# Patient Record
Sex: FEMALE | Race: ASIAN | URBAN - METROPOLITAN AREA
[De-identification: names, ages, dates, MRNs, and addresses within clinical notes are randomized per-mention and may not be internally consistent; named-entity substitution may affect disease eponyms.]

---

## 2017-01-30 ENCOUNTER — OFFICE VISIT - HEALTHEAST (OUTPATIENT)
Dept: INTERNAL MEDICINE | Facility: CLINIC | Age: 42
End: 2017-01-30

## 2017-01-30 ENCOUNTER — HOSPITAL ENCOUNTER (OUTPATIENT)
Dept: CT IMAGING | Facility: CLINIC | Age: 42
Discharge: HOME | End: 2017-01-30
Attending: INTERNAL MEDICINE

## 2017-01-30 DIAGNOSIS — R10.9 ABDOMINAL PAIN: ICD-10-CM

## 2017-01-30 DIAGNOSIS — R19.7 DIARRHEA: ICD-10-CM

## 2017-01-30 ASSESSMENT — MIFFLIN-ST. JEOR: SCORE: 1338.08

## 2017-02-14 ENCOUNTER — COMMUNICATION - HEALTHEAST (OUTPATIENT)
Dept: INTERNAL MEDICINE | Facility: CLINIC | Age: 42
End: 2017-02-14

## 2017-03-01 ENCOUNTER — RECORDS - HEALTHEAST (OUTPATIENT)
Dept: ADMINISTRATIVE | Facility: OTHER | Age: 42
End: 2017-03-01

## 2017-03-02 ENCOUNTER — RECORDS - HEALTHEAST (OUTPATIENT)
Dept: ADMINISTRATIVE | Facility: OTHER | Age: 42
End: 2017-03-02

## 2017-04-05 ENCOUNTER — COMMUNICATION - HEALTHEAST (OUTPATIENT)
Dept: INTERNAL MEDICINE | Facility: CLINIC | Age: 42
End: 2017-04-05

## 2017-04-05 DIAGNOSIS — I10 HYPERTENSION, ESSENTIAL: ICD-10-CM

## 2017-04-20 ENCOUNTER — RECORDS - HEALTHEAST (OUTPATIENT)
Dept: ADMINISTRATIVE | Facility: OTHER | Age: 42
End: 2017-04-20

## 2017-04-24 ENCOUNTER — OFFICE VISIT - HEALTHEAST (OUTPATIENT)
Dept: UROLOGY | Facility: CLINIC | Age: 42
End: 2017-04-24

## 2017-04-24 DIAGNOSIS — N20.1 CALCULUS OF URETER: ICD-10-CM

## 2017-04-24 DIAGNOSIS — N20.9 URINARY CALCULUS, UNSPECIFIED: ICD-10-CM

## 2017-04-24 DIAGNOSIS — N20.0 CALCULUS OF KIDNEY: ICD-10-CM

## 2017-04-24 DIAGNOSIS — R10.30 LOWER ABDOMINAL PAIN: ICD-10-CM

## 2017-04-24 DIAGNOSIS — N13.2 HYDRONEPHROSIS WITH URINARY OBSTRUCTION DUE TO URETERAL CALCULUS: ICD-10-CM

## 2017-05-08 ENCOUNTER — AMBULATORY - HEALTHEAST (OUTPATIENT)
Dept: UROLOGY | Facility: CLINIC | Age: 42
End: 2017-05-08

## 2017-05-08 DIAGNOSIS — N20.9 URINARY CALCULUS, UNSPECIFIED: ICD-10-CM

## 2017-05-18 ENCOUNTER — AMBULATORY - HEALTHEAST (OUTPATIENT)
Dept: LAB | Facility: CLINIC | Age: 42
End: 2017-05-18

## 2017-05-18 DIAGNOSIS — N20.9 URINARY CALCULUS, UNSPECIFIED: ICD-10-CM

## 2017-05-20 LAB
1ST CONSTITUENT:: NORMAL
2ND CONSTITUENT:: NORMAL
3RD CONSTITUENT:: NORMAL
SOURCE: NORMAL

## 2017-05-25 ENCOUNTER — OFFICE VISIT - HEALTHEAST (OUTPATIENT)
Dept: UROLOGY | Facility: CLINIC | Age: 42
End: 2017-05-25

## 2017-05-25 DIAGNOSIS — N20.1 CALCULUS OF URETER: ICD-10-CM

## 2017-05-25 DIAGNOSIS — N20.9 URINARY CALCULUS, UNSPECIFIED: ICD-10-CM

## 2017-05-25 DIAGNOSIS — N20.0 CALCULUS OF KIDNEY: ICD-10-CM

## 2017-06-30 ENCOUNTER — COMMUNICATION - HEALTHEAST (OUTPATIENT)
Dept: INTERNAL MEDICINE | Facility: CLINIC | Age: 42
End: 2017-06-30

## 2017-09-13 ENCOUNTER — COMMUNICATION - HEALTHEAST (OUTPATIENT)
Dept: HEALTH INFORMATION MANAGEMENT | Facility: CLINIC | Age: 42
End: 2017-09-13

## 2017-12-14 ENCOUNTER — RECORDS - HEALTHEAST (OUTPATIENT)
Dept: ADMINISTRATIVE | Facility: OTHER | Age: 42
End: 2017-12-14

## 2018-02-13 ENCOUNTER — OFFICE VISIT - HEALTHEAST (OUTPATIENT)
Dept: ALLERGY | Facility: CLINIC | Age: 43
End: 2018-02-13

## 2018-02-13 DIAGNOSIS — T78.40XD ALLERGIC REACTION, SUBSEQUENT ENCOUNTER: ICD-10-CM

## 2018-02-13 DIAGNOSIS — Z91.018 ALLERGY TO TREE NUTS: ICD-10-CM

## 2018-02-13 RX ORDER — EPINEPHRINE 0.3 MG/.3ML
INJECTION SUBCUTANEOUS
Qty: 2 | Refills: 0 | Status: SHIPPED | OUTPATIENT
Start: 2018-02-13

## 2018-02-13 ASSESSMENT — MIFFLIN-ST. JEOR: SCORE: 1336.95

## 2018-02-14 ENCOUNTER — COMMUNICATION - HEALTHEAST (OUTPATIENT)
Dept: ALLERGY | Facility: CLINIC | Age: 43
End: 2018-02-14

## 2018-02-19 ENCOUNTER — HOSPITAL ENCOUNTER (OUTPATIENT)
Dept: PHYSICAL MEDICINE AND REHAB | Facility: CLINIC | Age: 43
Discharge: HOME | End: 2018-02-19
Attending: NURSE PRACTITIONER

## 2018-02-19 DIAGNOSIS — M54.12 RADICULITIS OF LEFT CERVICAL REGION: ICD-10-CM

## 2018-02-19 DIAGNOSIS — R20.2 NUMBNESS AND TINGLING IN LEFT ARM: ICD-10-CM

## 2018-02-19 DIAGNOSIS — M79.18 MYOFASCIAL PAIN: ICD-10-CM

## 2018-02-19 DIAGNOSIS — R20.0 NUMBNESS AND TINGLING IN LEFT ARM: ICD-10-CM

## 2018-02-20 ENCOUNTER — OFFICE VISIT - HEALTHEAST (OUTPATIENT)
Dept: PHYSICAL THERAPY | Facility: REHABILITATION | Age: 43
End: 2018-02-20

## 2018-02-20 DIAGNOSIS — R20.0 NUMBNESS AND TINGLING IN LEFT UPPER EXTREMITY: ICD-10-CM

## 2018-02-20 DIAGNOSIS — R29.3 POOR POSTURE: ICD-10-CM

## 2018-02-20 DIAGNOSIS — M54.12 CERVICAL RADICULITIS: ICD-10-CM

## 2018-02-20 DIAGNOSIS — R20.2 NUMBNESS AND TINGLING IN LEFT UPPER EXTREMITY: ICD-10-CM

## 2018-02-22 ENCOUNTER — OFFICE VISIT - HEALTHEAST (OUTPATIENT)
Dept: PHYSICAL THERAPY | Facility: REHABILITATION | Age: 43
End: 2018-02-22

## 2018-02-22 DIAGNOSIS — R20.0 NUMBNESS AND TINGLING IN LEFT UPPER EXTREMITY: ICD-10-CM

## 2018-02-22 DIAGNOSIS — R20.2 NUMBNESS AND TINGLING IN LEFT UPPER EXTREMITY: ICD-10-CM

## 2018-02-22 DIAGNOSIS — R29.3 POOR POSTURE: ICD-10-CM

## 2018-02-22 DIAGNOSIS — M54.12 CERVICAL RADICULITIS: ICD-10-CM

## 2018-02-27 ENCOUNTER — HOSPITAL ENCOUNTER (OUTPATIENT)
Dept: MRI IMAGING | Facility: CLINIC | Age: 43
Discharge: HOME | End: 2018-02-27

## 2018-02-27 DIAGNOSIS — R20.2 NUMBNESS AND TINGLING IN LEFT ARM: ICD-10-CM

## 2018-02-27 DIAGNOSIS — M54.12 RADICULITIS OF LEFT CERVICAL REGION: ICD-10-CM

## 2018-02-27 DIAGNOSIS — R20.0 NUMBNESS AND TINGLING IN LEFT ARM: ICD-10-CM

## 2018-03-02 ENCOUNTER — COMMUNICATION - HEALTHEAST (OUTPATIENT)
Dept: PHYSICAL MEDICINE AND REHAB | Facility: CLINIC | Age: 43
End: 2018-03-02

## 2018-03-02 DIAGNOSIS — M54.12 RADICULITIS OF LEFT CERVICAL REGION: ICD-10-CM

## 2018-03-02 DIAGNOSIS — M79.18 MYOFASCIAL PAIN: ICD-10-CM

## 2018-03-08 ENCOUNTER — COMMUNICATION - HEALTHEAST (OUTPATIENT)
Dept: HEALTH INFORMATION MANAGEMENT | Facility: CLINIC | Age: 43
End: 2018-03-08

## 2018-03-08 ENCOUNTER — COMMUNICATION - HEALTHEAST (OUTPATIENT)
Dept: TELEHEALTH | Facility: CLINIC | Age: 43
End: 2018-03-08

## 2018-03-09 ENCOUNTER — HOSPITAL ENCOUNTER (OUTPATIENT)
Dept: PHYSICAL MEDICINE AND REHAB | Facility: CLINIC | Age: 43
Discharge: HOME | End: 2018-03-09
Attending: NURSE PRACTITIONER

## 2018-03-09 DIAGNOSIS — M48.02 CERVICAL STENOSIS OF SPINE: ICD-10-CM

## 2018-03-09 DIAGNOSIS — M48.02 FORAMINAL STENOSIS OF CERVICAL REGION: ICD-10-CM

## 2018-03-09 DIAGNOSIS — M79.18 MYOFASCIAL PAIN: ICD-10-CM

## 2018-03-09 DIAGNOSIS — R20.2 NUMBNESS AND TINGLING IN LEFT ARM: ICD-10-CM

## 2018-03-09 DIAGNOSIS — R20.0 NUMBNESS AND TINGLING IN LEFT ARM: ICD-10-CM

## 2018-03-09 DIAGNOSIS — M54.12 RADICULITIS OF LEFT CERVICAL REGION: ICD-10-CM

## 2018-03-14 ENCOUNTER — HOSPITAL ENCOUNTER (OUTPATIENT)
Dept: PHYSICAL MEDICINE AND REHAB | Facility: CLINIC | Age: 43
Discharge: HOME | End: 2018-03-14
Attending: PHYSICAL MEDICINE & REHABILITATION

## 2018-03-14 DIAGNOSIS — M99.81 OTHER BIOMECHANICAL LESIONS OF CERVICAL REGION: ICD-10-CM

## 2018-03-14 DIAGNOSIS — M48.02 FORAMINAL STENOSIS OF CERVICAL REGION: ICD-10-CM

## 2018-03-14 DIAGNOSIS — M54.12 RADICULITIS OF LEFT CERVICAL REGION: ICD-10-CM

## 2018-03-26 ENCOUNTER — COMMUNICATION - HEALTHEAST (OUTPATIENT)
Dept: INTERNAL MEDICINE | Facility: CLINIC | Age: 43
End: 2018-03-26

## 2018-03-26 DIAGNOSIS — I10 HYPERTENSION, ESSENTIAL: ICD-10-CM

## 2018-03-28 ENCOUNTER — RECORDS - HEALTHEAST (OUTPATIENT)
Dept: ADMINISTRATIVE | Facility: OTHER | Age: 43
End: 2018-03-28

## 2018-03-29 ENCOUNTER — HOSPITAL ENCOUNTER (OUTPATIENT)
Dept: PHYSICAL MEDICINE AND REHAB | Facility: CLINIC | Age: 43
Discharge: HOME | End: 2018-03-29
Attending: NURSE PRACTITIONER

## 2018-03-29 DIAGNOSIS — M48.02 FORAMINAL STENOSIS OF CERVICAL REGION: ICD-10-CM

## 2018-03-29 DIAGNOSIS — M54.12 RADICULITIS OF LEFT CERVICAL REGION: ICD-10-CM

## 2018-03-29 DIAGNOSIS — M79.18 MYOFASCIAL PAIN: ICD-10-CM

## 2018-03-29 DIAGNOSIS — R20.0 NUMBNESS AND TINGLING IN LEFT ARM: ICD-10-CM

## 2018-03-29 DIAGNOSIS — R20.2 NUMBNESS AND TINGLING IN LEFT ARM: ICD-10-CM

## 2018-03-29 DIAGNOSIS — M48.02 CERVICAL STENOSIS OF SPINE: ICD-10-CM

## 2018-03-31 ENCOUNTER — RECORDS - HEALTHEAST (OUTPATIENT)
Dept: RADIOLOGY | Facility: CLINIC | Age: 43
End: 2018-03-31

## 2018-04-04 ENCOUNTER — OFFICE VISIT - HEALTHEAST (OUTPATIENT)
Dept: NEUROSURGERY | Facility: CLINIC | Age: 43
End: 2018-04-04

## 2018-04-04 DIAGNOSIS — G54.2 CERVICAL NERVE ROOT COMPRESSION: ICD-10-CM

## 2018-04-04 DIAGNOSIS — G95.20 CORD COMPRESSION MYELOPATHY (H): ICD-10-CM

## 2018-04-04 ASSESSMENT — MIFFLIN-ST. JEOR: SCORE: 1309.73

## 2018-04-05 ENCOUNTER — RECORDS - HEALTHEAST (OUTPATIENT)
Dept: ADMINISTRATIVE | Facility: OTHER | Age: 43
End: 2018-04-05

## 2018-04-10 ENCOUNTER — COMMUNICATION - HEALTHEAST (OUTPATIENT)
Dept: INTERNAL MEDICINE | Facility: CLINIC | Age: 43
End: 2018-04-10

## 2018-04-11 ENCOUNTER — COMMUNICATION - HEALTHEAST (OUTPATIENT)
Dept: NEUROSURGERY | Facility: CLINIC | Age: 43
End: 2018-04-11

## 2018-04-13 ENCOUNTER — COMMUNICATION - HEALTHEAST (OUTPATIENT)
Dept: INTERNAL MEDICINE | Facility: CLINIC | Age: 43
End: 2018-04-13

## 2018-04-17 ENCOUNTER — COMMUNICATION - HEALTHEAST (OUTPATIENT)
Dept: INTERNAL MEDICINE | Facility: CLINIC | Age: 43
End: 2018-04-17

## 2018-04-18 ENCOUNTER — OFFICE VISIT - HEALTHEAST (OUTPATIENT)
Dept: INTERNAL MEDICINE | Facility: CLINIC | Age: 43
End: 2018-04-18

## 2018-04-18 DIAGNOSIS — M48.02 FORAMINAL STENOSIS OF CERVICAL REGION: ICD-10-CM

## 2018-04-18 DIAGNOSIS — G95.20 CERVICAL CORD COMPRESSION WITH MYELOPATHY (H): ICD-10-CM

## 2018-04-18 DIAGNOSIS — I10 ESSENTIAL HYPERTENSION: ICD-10-CM

## 2018-04-19 ENCOUNTER — RECORDS - HEALTHEAST (OUTPATIENT)
Dept: ADMINISTRATIVE | Facility: OTHER | Age: 43
End: 2018-04-19

## 2018-04-24 ENCOUNTER — COMMUNICATION - HEALTHEAST (OUTPATIENT)
Dept: NEUROSURGERY | Facility: CLINIC | Age: 43
End: 2018-04-24

## 2018-04-25 ENCOUNTER — OFFICE VISIT - HEALTHEAST (OUTPATIENT)
Dept: INTERNAL MEDICINE | Facility: CLINIC | Age: 43
End: 2018-04-25

## 2018-04-25 DIAGNOSIS — Z01.818 PREOP EXAM FOR INTERNAL MEDICINE: ICD-10-CM

## 2018-04-25 DIAGNOSIS — M48.02 FORAMINAL STENOSIS OF CERVICAL REGION: ICD-10-CM

## 2018-04-25 LAB
ALBUMIN UR-MCNC: NEGATIVE MG/DL
ANION GAP SERPL CALCULATED.3IONS-SCNC: 12 MMOL/L (ref 5–18)
APPEARANCE UR: CLEAR
ATRIAL RATE - MUSE: 72 BPM
BILIRUB UR QL STRIP: NEGATIVE
BUN SERPL-MCNC: 12 MG/DL (ref 8–22)
CALCIUM SERPL-MCNC: 9.2 MG/DL (ref 8.5–10.5)
CHLORIDE BLD-SCNC: 103 MMOL/L (ref 98–107)
CO2 SERPL-SCNC: 23 MMOL/L (ref 22–31)
COLOR UR AUTO: YELLOW
CREAT SERPL-MCNC: 0.63 MG/DL (ref 0.6–1.1)
DIASTOLIC BLOOD PRESSURE - MUSE: NORMAL MMHG
ERYTHROCYTE [DISTWIDTH] IN BLOOD BY AUTOMATED COUNT: 11.8 % (ref 11–14.5)
GFR SERPL CREATININE-BSD FRML MDRD: >60 ML/MIN/1.73M2
GLUCOSE BLD-MCNC: 97 MG/DL (ref 70–125)
GLUCOSE UR STRIP-MCNC: NEGATIVE MG/DL
HCG UR QL: NEGATIVE
HCT VFR BLD AUTO: 41.1 % (ref 35–47)
HGB BLD-MCNC: 13.8 G/DL (ref 12–16)
HGB UR QL STRIP: NEGATIVE
INTERPRETATION ECG - MUSE: NORMAL
KETONES UR STRIP-MCNC: ABNORMAL MG/DL
LEUKOCYTE ESTERASE UR QL STRIP: NEGATIVE
MCH RBC QN AUTO: 29.3 PG (ref 27–34)
MCHC RBC AUTO-ENTMCNC: 33.4 G/DL (ref 32–36)
MCV RBC AUTO: 88 FL (ref 80–100)
NITRATE UR QL: NEGATIVE
P AXIS - MUSE: 55 DEGREES
PH UR STRIP: 7.5 [PH] (ref 5–8)
PLATELET # BLD AUTO: 216 THOU/UL (ref 140–440)
PMV BLD AUTO: 7.3 FL (ref 7–10)
POTASSIUM BLD-SCNC: 3.8 MMOL/L (ref 3.5–5)
PR INTERVAL - MUSE: 154 MS
QRS DURATION - MUSE: 98 MS
QT - MUSE: 400 MS
QTC - MUSE: 438 MS
R AXIS - MUSE: 13 DEGREES
RBC # BLD AUTO: 4.69 MILL/UL (ref 3.8–5.4)
SODIUM SERPL-SCNC: 138 MMOL/L (ref 136–145)
SP GR UR STRIP: 1.01 (ref 1–1.03)
SP GR UR STRIP: 1.01 (ref 1–1.03)
SYSTOLIC BLOOD PRESSURE - MUSE: NORMAL MMHG
T AXIS - MUSE: 26 DEGREES
UROBILINOGEN UR STRIP-ACNC: ABNORMAL
VENTRICULAR RATE- MUSE: 72 BPM
WBC: 3 THOU/UL (ref 4–11)

## 2018-04-25 ASSESSMENT — MIFFLIN-ST. JEOR: SCORE: 1320.22

## 2018-04-30 ENCOUNTER — COMMUNICATION - HEALTHEAST (OUTPATIENT)
Dept: NEUROSURGERY | Facility: CLINIC | Age: 43
End: 2018-04-30

## 2018-05-02 ENCOUNTER — COMMUNICATION - HEALTHEAST (OUTPATIENT)
Dept: INTERNAL MEDICINE | Facility: CLINIC | Age: 43
End: 2018-05-02

## 2018-05-02 ENCOUNTER — OFFICE VISIT - HEALTHEAST (OUTPATIENT)
Dept: NEUROSURGERY | Facility: CLINIC | Age: 43
End: 2018-05-02

## 2018-05-02 DIAGNOSIS — Z01.818 PRE-OP EVALUATION: ICD-10-CM

## 2018-05-02 DIAGNOSIS — G95.20 CERVICAL SPINAL CORD COMPRESSION (H): ICD-10-CM

## 2018-05-07 ENCOUNTER — ANESTHESIA - HEALTHEAST (OUTPATIENT)
Dept: SURGERY | Facility: CLINIC | Age: 43
End: 2018-05-07

## 2018-05-07 ENCOUNTER — SURGERY - HEALTHEAST (OUTPATIENT)
Dept: SURGERY | Facility: CLINIC | Age: 43
End: 2018-05-07

## 2018-05-07 ASSESSMENT — MIFFLIN-ST. JEOR
SCORE: 1261.88
SCORE: 1304.06

## 2018-05-09 ENCOUNTER — COMMUNICATION - HEALTHEAST (OUTPATIENT)
Dept: NEUROSURGERY | Facility: CLINIC | Age: 43
End: 2018-05-09

## 2018-05-09 DIAGNOSIS — G95.20 CORD COMPRESSION MYELOPATHY (H): ICD-10-CM

## 2018-05-14 ENCOUNTER — COMMUNICATION - HEALTHEAST (OUTPATIENT)
Dept: NEUROSURGERY | Facility: CLINIC | Age: 43
End: 2018-05-14

## 2018-05-15 ENCOUNTER — TELEPHONE (OUTPATIENT)
Dept: OTHER | Facility: CLINIC | Age: 43
End: 2018-05-15

## 2018-05-15 ENCOUNTER — AMBULATORY - HEALTHEAST (OUTPATIENT)
Dept: NEUROSURGERY | Facility: CLINIC | Age: 43
End: 2018-05-15

## 2018-05-15 DIAGNOSIS — Z51.89 VISIT FOR WOUND CHECK: ICD-10-CM

## 2018-05-15 DIAGNOSIS — M79.18 MYOFASCIAL PAIN: ICD-10-CM

## 2018-05-15 DIAGNOSIS — G95.20 CERVICAL CORD COMPRESSION WITH MYELOPATHY (H): ICD-10-CM

## 2018-05-15 DIAGNOSIS — M54.12 RADICULITIS OF LEFT CERVICAL REGION: ICD-10-CM

## 2018-05-18 ENCOUNTER — RECORDS - HEALTHEAST (OUTPATIENT)
Dept: ADMINISTRATIVE | Facility: OTHER | Age: 43
End: 2018-05-18

## 2018-05-21 ENCOUNTER — AMBULATORY - HEALTHEAST (OUTPATIENT)
Dept: NEUROSURGERY | Facility: CLINIC | Age: 43
End: 2018-05-21

## 2018-05-22 ENCOUNTER — OFFICE VISIT - HEALTHEAST (OUTPATIENT)
Dept: INTERNAL MEDICINE | Facility: CLINIC | Age: 43
End: 2018-05-22

## 2018-05-22 DIAGNOSIS — M48.02 FORAMINAL STENOSIS OF CERVICAL REGION: ICD-10-CM

## 2018-05-22 DIAGNOSIS — I10 ESSENTIAL HYPERTENSION: ICD-10-CM

## 2018-05-29 ENCOUNTER — COMMUNICATION - HEALTHEAST (OUTPATIENT)
Dept: NEUROSURGERY | Facility: CLINIC | Age: 43
End: 2018-05-29

## 2018-05-30 ENCOUNTER — COMMUNICATION - HEALTHEAST (OUTPATIENT)
Dept: NEUROSURGERY | Facility: CLINIC | Age: 43
End: 2018-05-30

## 2018-05-30 DIAGNOSIS — M54.2 NECK PAIN: ICD-10-CM

## 2018-06-08 ENCOUNTER — HOSPITAL ENCOUNTER (OUTPATIENT)
Dept: RADIOLOGY | Facility: CLINIC | Age: 43
Discharge: HOME | End: 2018-06-08
Attending: NEUROLOGICAL SURGERY

## 2018-06-08 ENCOUNTER — OFFICE VISIT - HEALTHEAST (OUTPATIENT)
Dept: NEUROSURGERY | Facility: CLINIC | Age: 43
End: 2018-06-08

## 2018-06-08 DIAGNOSIS — G95.20 CORD COMPRESSION MYELOPATHY (H): ICD-10-CM

## 2018-06-08 DIAGNOSIS — G54.2 CERVICAL NERVE ROOT COMPRESSION: ICD-10-CM

## 2018-06-08 ASSESSMENT — MIFFLIN-ST. JEOR: SCORE: 1276.85

## 2018-06-19 ENCOUNTER — OFFICE VISIT - HEALTHEAST (OUTPATIENT)
Dept: PHYSICAL THERAPY | Facility: REHABILITATION | Age: 43
End: 2018-06-19

## 2018-06-19 DIAGNOSIS — G54.2 CERVICAL NERVE ROOT COMPRESSION: ICD-10-CM

## 2018-06-19 DIAGNOSIS — M54.2 CHRONIC NECK PAIN: ICD-10-CM

## 2018-06-19 DIAGNOSIS — I10 ESSENTIAL HYPERTENSION: ICD-10-CM

## 2018-06-19 DIAGNOSIS — M53.82 NECK MUSCLE WEAKNESS: ICD-10-CM

## 2018-06-19 DIAGNOSIS — R29.3 ABNORMAL POSTURE: ICD-10-CM

## 2018-06-19 DIAGNOSIS — G89.29 CHRONIC NECK PAIN: ICD-10-CM

## 2018-06-26 ENCOUNTER — COMMUNICATION - HEALTHEAST (OUTPATIENT)
Dept: NEUROSURGERY | Facility: CLINIC | Age: 43
End: 2018-06-26

## 2018-07-03 ENCOUNTER — OFFICE VISIT - HEALTHEAST (OUTPATIENT)
Dept: PHYSICAL THERAPY | Facility: REHABILITATION | Age: 43
End: 2018-07-03

## 2018-07-03 DIAGNOSIS — R20.2 NUMBNESS AND TINGLING IN LEFT UPPER EXTREMITY: ICD-10-CM

## 2018-07-03 DIAGNOSIS — M53.82 NECK MUSCLE WEAKNESS: ICD-10-CM

## 2018-07-03 DIAGNOSIS — G54.2 CERVICAL NERVE ROOT COMPRESSION: ICD-10-CM

## 2018-07-03 DIAGNOSIS — I10 ESSENTIAL HYPERTENSION: ICD-10-CM

## 2018-07-03 DIAGNOSIS — M54.12 CERVICAL RADICULITIS: ICD-10-CM

## 2018-07-03 DIAGNOSIS — R29.3 POOR POSTURE: ICD-10-CM

## 2018-07-03 DIAGNOSIS — R29.3 ABNORMAL POSTURE: ICD-10-CM

## 2018-07-03 DIAGNOSIS — R20.0 NUMBNESS AND TINGLING IN LEFT UPPER EXTREMITY: ICD-10-CM

## 2018-07-03 DIAGNOSIS — G89.29 CHRONIC NECK PAIN: ICD-10-CM

## 2018-07-03 DIAGNOSIS — M54.2 CHRONIC NECK PAIN: ICD-10-CM

## 2018-07-10 ENCOUNTER — OFFICE VISIT - HEALTHEAST (OUTPATIENT)
Dept: PHYSICAL THERAPY | Facility: REHABILITATION | Age: 43
End: 2018-07-10

## 2018-07-10 DIAGNOSIS — R29.3 ABNORMAL POSTURE: ICD-10-CM

## 2018-07-10 DIAGNOSIS — M53.82 NECK MUSCLE WEAKNESS: ICD-10-CM

## 2018-07-10 DIAGNOSIS — M54.12 CERVICAL RADICULITIS: ICD-10-CM

## 2018-07-10 DIAGNOSIS — R20.2 NUMBNESS AND TINGLING IN LEFT UPPER EXTREMITY: ICD-10-CM

## 2018-07-10 DIAGNOSIS — M54.2 CHRONIC NECK PAIN: ICD-10-CM

## 2018-07-10 DIAGNOSIS — R20.0 NUMBNESS AND TINGLING IN LEFT UPPER EXTREMITY: ICD-10-CM

## 2018-07-10 DIAGNOSIS — G54.2 CERVICAL NERVE ROOT COMPRESSION: ICD-10-CM

## 2018-07-10 DIAGNOSIS — G89.29 CHRONIC NECK PAIN: ICD-10-CM

## 2018-07-10 DIAGNOSIS — R29.3 POOR POSTURE: ICD-10-CM

## 2018-07-10 DIAGNOSIS — I10 ESSENTIAL HYPERTENSION: ICD-10-CM

## 2018-07-12 ENCOUNTER — COMMUNICATION - HEALTHEAST (OUTPATIENT)
Dept: NEUROSURGERY | Facility: CLINIC | Age: 43
End: 2018-07-12

## 2018-07-13 ENCOUNTER — AMBULATORY - HEALTHEAST (OUTPATIENT)
Dept: NEUROSURGERY | Facility: CLINIC | Age: 43
End: 2018-07-13

## 2018-07-13 DIAGNOSIS — M79.642 HAND PAIN, LEFT: ICD-10-CM

## 2018-07-17 ENCOUNTER — OFFICE VISIT - HEALTHEAST (OUTPATIENT)
Dept: PHYSICAL THERAPY | Facility: REHABILITATION | Age: 43
End: 2018-07-17

## 2018-07-17 DIAGNOSIS — M53.82 NECK MUSCLE WEAKNESS: ICD-10-CM

## 2018-07-17 DIAGNOSIS — R29.3 ABNORMAL POSTURE: ICD-10-CM

## 2018-07-17 DIAGNOSIS — M54.2 CHRONIC NECK PAIN: ICD-10-CM

## 2018-07-17 DIAGNOSIS — R29.3 POOR POSTURE: ICD-10-CM

## 2018-07-17 DIAGNOSIS — G89.29 CHRONIC NECK PAIN: ICD-10-CM

## 2018-07-17 DIAGNOSIS — I10 ESSENTIAL HYPERTENSION: ICD-10-CM

## 2018-07-17 DIAGNOSIS — G54.2 CERVICAL NERVE ROOT COMPRESSION: ICD-10-CM

## 2018-07-17 DIAGNOSIS — M54.12 CERVICAL RADICULITIS: ICD-10-CM

## 2018-07-17 DIAGNOSIS — R20.2 NUMBNESS AND TINGLING IN LEFT UPPER EXTREMITY: ICD-10-CM

## 2018-07-17 DIAGNOSIS — R20.0 NUMBNESS AND TINGLING IN LEFT UPPER EXTREMITY: ICD-10-CM

## 2018-07-26 ENCOUNTER — OFFICE VISIT - HEALTHEAST (OUTPATIENT)
Dept: PHYSICAL THERAPY | Facility: REHABILITATION | Age: 43
End: 2018-07-26

## 2018-07-26 DIAGNOSIS — G54.2 CERVICAL NERVE ROOT COMPRESSION: ICD-10-CM

## 2018-07-26 DIAGNOSIS — R29.3 ABNORMAL POSTURE: ICD-10-CM

## 2018-07-26 DIAGNOSIS — M53.82 NECK MUSCLE WEAKNESS: ICD-10-CM

## 2018-07-26 DIAGNOSIS — M54.2 CHRONIC NECK PAIN: ICD-10-CM

## 2018-07-26 DIAGNOSIS — I10 ESSENTIAL HYPERTENSION: ICD-10-CM

## 2018-07-26 DIAGNOSIS — G89.29 CHRONIC NECK PAIN: ICD-10-CM

## 2018-07-31 ENCOUNTER — OFFICE VISIT - HEALTHEAST (OUTPATIENT)
Dept: PHYSICAL THERAPY | Facility: REHABILITATION | Age: 43
End: 2018-07-31

## 2018-07-31 DIAGNOSIS — R29.3 ABNORMAL POSTURE: ICD-10-CM

## 2018-07-31 DIAGNOSIS — I10 ESSENTIAL HYPERTENSION: ICD-10-CM

## 2018-07-31 DIAGNOSIS — M53.82 NECK MUSCLE WEAKNESS: ICD-10-CM

## 2018-07-31 DIAGNOSIS — G89.29 CHRONIC NECK PAIN: ICD-10-CM

## 2018-07-31 DIAGNOSIS — G54.2 CERVICAL NERVE ROOT COMPRESSION: ICD-10-CM

## 2018-07-31 DIAGNOSIS — M54.2 CHRONIC NECK PAIN: ICD-10-CM

## 2018-08-03 ENCOUNTER — HOSPITAL ENCOUNTER (OUTPATIENT)
Dept: PHYSICAL MEDICINE AND REHAB | Facility: CLINIC | Age: 43
Discharge: HOME | End: 2018-08-03
Attending: NEUROLOGICAL SURGERY

## 2018-08-03 DIAGNOSIS — M79.642 HAND PAIN, LEFT: ICD-10-CM

## 2018-08-07 ENCOUNTER — OFFICE VISIT - HEALTHEAST (OUTPATIENT)
Dept: PHYSICAL THERAPY | Facility: REHABILITATION | Age: 43
End: 2018-08-07

## 2018-08-07 DIAGNOSIS — G89.29 CHRONIC NECK PAIN: ICD-10-CM

## 2018-08-07 DIAGNOSIS — I10 ESSENTIAL HYPERTENSION: ICD-10-CM

## 2018-08-07 DIAGNOSIS — G54.2 CERVICAL NERVE ROOT COMPRESSION: ICD-10-CM

## 2018-08-07 DIAGNOSIS — R29.3 ABNORMAL POSTURE: ICD-10-CM

## 2018-08-07 DIAGNOSIS — M54.2 CHRONIC NECK PAIN: ICD-10-CM

## 2018-08-07 DIAGNOSIS — M53.82 NECK MUSCLE WEAKNESS: ICD-10-CM

## 2018-08-10 ENCOUNTER — OFFICE VISIT - HEALTHEAST (OUTPATIENT)
Dept: NEUROSURGERY | Facility: CLINIC | Age: 43
End: 2018-08-10

## 2018-08-10 DIAGNOSIS — G54.2 CERVICAL NERVE ROOT COMPRESSION: ICD-10-CM

## 2018-08-10 ASSESSMENT — MIFFLIN-ST. JEOR: SCORE: 1245.09

## 2018-08-15 ENCOUNTER — HOSPITAL ENCOUNTER (OUTPATIENT)
Dept: PHYSICAL MEDICINE AND REHAB | Facility: CLINIC | Age: 43
Discharge: HOME | End: 2018-08-15
Attending: NEUROLOGICAL SURGERY

## 2018-08-15 ENCOUNTER — OFFICE VISIT - HEALTHEAST (OUTPATIENT)
Dept: PHYSICAL THERAPY | Facility: REHABILITATION | Age: 43
End: 2018-08-15

## 2018-08-15 DIAGNOSIS — I10 ESSENTIAL HYPERTENSION: ICD-10-CM

## 2018-08-15 DIAGNOSIS — M54.2 NECK PAIN: ICD-10-CM

## 2018-08-15 DIAGNOSIS — G89.29 CHRONIC NECK PAIN: ICD-10-CM

## 2018-08-15 DIAGNOSIS — G54.2 CERVICAL NERVE ROOT COMPRESSION: ICD-10-CM

## 2018-08-15 DIAGNOSIS — M54.2 CHRONIC NECK PAIN: ICD-10-CM

## 2018-08-15 DIAGNOSIS — R29.3 ABNORMAL POSTURE: ICD-10-CM

## 2018-08-15 DIAGNOSIS — M53.82 NECK MUSCLE WEAKNESS: ICD-10-CM

## 2018-08-20 ENCOUNTER — HOSPITAL ENCOUNTER (OUTPATIENT)
Dept: MRI IMAGING | Facility: CLINIC | Age: 43
Discharge: HOME | End: 2018-08-20
Attending: NEUROLOGICAL SURGERY

## 2018-08-20 DIAGNOSIS — M54.2 NECK PAIN: ICD-10-CM

## 2018-08-22 ENCOUNTER — COMMUNICATION - HEALTHEAST (OUTPATIENT)
Dept: NEUROSURGERY | Facility: CLINIC | Age: 43
End: 2018-08-22

## 2018-08-22 ENCOUNTER — OFFICE VISIT - HEALTHEAST (OUTPATIENT)
Dept: PHYSICAL THERAPY | Facility: REHABILITATION | Age: 43
End: 2018-08-22

## 2018-08-22 DIAGNOSIS — R29.3 ABNORMAL POSTURE: ICD-10-CM

## 2018-08-22 DIAGNOSIS — G54.2 CERVICAL NERVE ROOT COMPRESSION: ICD-10-CM

## 2018-08-22 DIAGNOSIS — M54.2 CHRONIC NECK PAIN: ICD-10-CM

## 2018-08-22 DIAGNOSIS — M53.82 NECK MUSCLE WEAKNESS: ICD-10-CM

## 2018-08-22 DIAGNOSIS — G89.29 CHRONIC NECK PAIN: ICD-10-CM

## 2018-08-23 ENCOUNTER — COMMUNICATION - HEALTHEAST (OUTPATIENT)
Dept: PHYSICAL MEDICINE AND REHAB | Facility: CLINIC | Age: 43
End: 2018-08-23

## 2018-08-27 ENCOUNTER — OFFICE VISIT - HEALTHEAST (OUTPATIENT)
Dept: PHYSICAL THERAPY | Facility: REHABILITATION | Age: 43
End: 2018-08-27

## 2018-08-27 DIAGNOSIS — G89.29 CHRONIC NECK PAIN: ICD-10-CM

## 2018-08-27 DIAGNOSIS — M54.2 CHRONIC NECK PAIN: ICD-10-CM

## 2018-08-27 DIAGNOSIS — G54.2 CERVICAL NERVE ROOT COMPRESSION: ICD-10-CM

## 2018-08-27 DIAGNOSIS — M53.82 NECK MUSCLE WEAKNESS: ICD-10-CM

## 2018-08-27 DIAGNOSIS — R29.3 ABNORMAL POSTURE: ICD-10-CM

## 2018-08-27 DIAGNOSIS — M54.12 CERVICAL RADICULITIS: ICD-10-CM

## 2018-08-27 DIAGNOSIS — I10 ESSENTIAL HYPERTENSION: ICD-10-CM

## 2018-09-07 ENCOUNTER — HOSPITAL ENCOUNTER (OUTPATIENT)
Dept: PHYSICAL MEDICINE AND REHAB | Facility: CLINIC | Age: 43
Discharge: HOME | End: 2018-09-07
Attending: NEUROLOGICAL SURGERY

## 2018-09-07 DIAGNOSIS — M54.12 RADICULITIS OF LEFT CERVICAL REGION: ICD-10-CM

## 2018-09-12 ENCOUNTER — OFFICE VISIT - HEALTHEAST (OUTPATIENT)
Dept: PHYSICAL THERAPY | Facility: REHABILITATION | Age: 43
End: 2018-09-12

## 2018-09-12 DIAGNOSIS — R29.3 ABNORMAL POSTURE: ICD-10-CM

## 2018-09-12 DIAGNOSIS — G54.2 CERVICAL NERVE ROOT COMPRESSION: ICD-10-CM

## 2018-09-12 DIAGNOSIS — M54.2 CHRONIC NECK PAIN: ICD-10-CM

## 2018-09-12 DIAGNOSIS — G89.29 CHRONIC NECK PAIN: ICD-10-CM

## 2018-09-12 DIAGNOSIS — M53.82 NECK MUSCLE WEAKNESS: ICD-10-CM

## 2018-09-12 DIAGNOSIS — I10 ESSENTIAL HYPERTENSION: ICD-10-CM

## 2018-09-17 ENCOUNTER — OFFICE VISIT - HEALTHEAST (OUTPATIENT)
Dept: PHYSICAL THERAPY | Facility: REHABILITATION | Age: 43
End: 2018-09-17

## 2018-09-17 DIAGNOSIS — G89.29 CHRONIC NECK PAIN: ICD-10-CM

## 2018-09-17 DIAGNOSIS — G54.2 CERVICAL NERVE ROOT COMPRESSION: ICD-10-CM

## 2018-09-17 DIAGNOSIS — I10 ESSENTIAL HYPERTENSION: ICD-10-CM

## 2018-09-17 DIAGNOSIS — M54.2 CHRONIC NECK PAIN: ICD-10-CM

## 2018-09-17 DIAGNOSIS — M53.82 NECK MUSCLE WEAKNESS: ICD-10-CM

## 2018-09-17 DIAGNOSIS — R29.3 ABNORMAL POSTURE: ICD-10-CM

## 2018-09-18 ENCOUNTER — COMMUNICATION - HEALTHEAST (OUTPATIENT)
Dept: INTERNAL MEDICINE | Facility: CLINIC | Age: 43
End: 2018-09-18

## 2018-09-18 ENCOUNTER — RECORDS - HEALTHEAST (OUTPATIENT)
Dept: ADMINISTRATIVE | Facility: OTHER | Age: 43
End: 2018-09-18

## 2018-09-19 ENCOUNTER — OFFICE VISIT - HEALTHEAST (OUTPATIENT)
Dept: INTERNAL MEDICINE | Facility: CLINIC | Age: 43
End: 2018-09-19

## 2018-09-19 DIAGNOSIS — K52.9 GASTROENTERITIS: ICD-10-CM

## 2018-09-26 ENCOUNTER — OFFICE VISIT - HEALTHEAST (OUTPATIENT)
Dept: PHYSICAL THERAPY | Facility: REHABILITATION | Age: 43
End: 2018-09-26

## 2018-09-26 DIAGNOSIS — G54.2 CERVICAL NERVE ROOT COMPRESSION: ICD-10-CM

## 2018-09-26 DIAGNOSIS — R29.3 ABNORMAL POSTURE: ICD-10-CM

## 2018-09-26 DIAGNOSIS — G89.29 CHRONIC NECK PAIN: ICD-10-CM

## 2018-09-26 DIAGNOSIS — M54.2 CHRONIC NECK PAIN: ICD-10-CM

## 2018-09-26 DIAGNOSIS — M53.82 NECK MUSCLE WEAKNESS: ICD-10-CM

## 2018-09-28 ENCOUNTER — OFFICE VISIT - HEALTHEAST (OUTPATIENT)
Dept: NEUROSURGERY | Facility: CLINIC | Age: 43
End: 2018-09-28

## 2018-09-28 DIAGNOSIS — G54.2 CERVICAL NERVE ROOT COMPRESSION: ICD-10-CM

## 2018-09-28 ASSESSMENT — MIFFLIN-ST. JEOR: SCORE: 1231.49

## 2018-10-03 ENCOUNTER — COMMUNICATION - HEALTHEAST (OUTPATIENT)
Dept: NEUROSURGERY | Facility: CLINIC | Age: 43
End: 2018-10-03

## 2018-10-10 ENCOUNTER — OFFICE VISIT - HEALTHEAST (OUTPATIENT)
Dept: ALLERGY | Facility: CLINIC | Age: 43
End: 2018-10-10

## 2018-10-10 DIAGNOSIS — J45.990 EXERCISE-INDUCED ASTHMA: ICD-10-CM

## 2018-10-10 DIAGNOSIS — Z91.018 FOOD ALLERGY: ICD-10-CM

## 2018-10-10 ASSESSMENT — MIFFLIN-ST. JEOR: SCORE: 1241.92

## 2018-10-11 LAB
CARROT IGE QN: <0.35 KU/L
EGG WHITE IGE QN: 0.5 KU/L

## 2018-10-12 LAB
ARUP MISCELLANEOUS TEST: NORMAL
BANANA IGE QN: <0.1 KU(A)/L
BLUE MUSSEL IGE QN: <0.1 KU/L
DEPRECATED MISC ALLERGEN IGE RAST QL: NORMAL
MISCELLANEOUS TEST DEPT. - HE HISTORICAL: NORMAL
OYSTER IGE QN: <0.1 KU(A)/L
PERFORMING LAB: NORMAL
SPECIMEN STATUS: NORMAL
TEST NAME: NORMAL
TRYPTASE SERPL-MCNC: 5.7 UG/L

## 2018-10-15 ENCOUNTER — COMMUNICATION - HEALTHEAST (OUTPATIENT)
Dept: ALLERGY | Facility: CLINIC | Age: 43
End: 2018-10-15

## 2018-10-29 ENCOUNTER — COMMUNICATION - HEALTHEAST (OUTPATIENT)
Dept: NEUROSURGERY | Facility: CLINIC | Age: 43
End: 2018-10-29

## 2018-10-31 ENCOUNTER — OFFICE VISIT - HEALTHEAST (OUTPATIENT)
Dept: INTERNAL MEDICINE | Facility: CLINIC | Age: 43
End: 2018-10-31

## 2018-10-31 DIAGNOSIS — G95.20 CERVICAL CORD COMPRESSION WITH MYELOPATHY (H): ICD-10-CM

## 2018-10-31 DIAGNOSIS — Z01.818 PREOP EXAMINATION: ICD-10-CM

## 2018-10-31 DIAGNOSIS — K21.9 GASTROESOPHAGEAL REFLUX DISEASE WITHOUT ESOPHAGITIS: ICD-10-CM

## 2018-10-31 DIAGNOSIS — I10 ESSENTIAL HYPERTENSION: ICD-10-CM

## 2018-10-31 LAB
ANION GAP SERPL CALCULATED.3IONS-SCNC: 13 MMOL/L (ref 5–18)
BUN SERPL-MCNC: 13 MG/DL (ref 8–22)
CALCIUM SERPL-MCNC: 9.6 MG/DL (ref 8.5–10.5)
CHLORIDE BLD-SCNC: 102 MMOL/L (ref 98–107)
CO2 SERPL-SCNC: 23 MMOL/L (ref 22–31)
CREAT SERPL-MCNC: 0.7 MG/DL (ref 0.6–1.1)
GFR SERPL CREATININE-BSD FRML MDRD: >60 ML/MIN/1.73M2
GLUCOSE BLD-MCNC: 81 MG/DL (ref 70–125)
HGB BLD-MCNC: 12.1 G/DL (ref 12–16)
POTASSIUM BLD-SCNC: 3.9 MMOL/L (ref 3.5–5)
SODIUM SERPL-SCNC: 138 MMOL/L (ref 136–145)

## 2018-10-31 RX ORDER — ALBUTEROL SULFATE 90 UG/1
2 AEROSOL, METERED RESPIRATORY (INHALATION) EVERY 6 HOURS PRN
Qty: 18 G | Refills: 5 | Status: SHIPPED | OUTPATIENT
Start: 2018-10-31

## 2018-10-31 ASSESSMENT — MIFFLIN-ST. JEOR: SCORE: 1238.29

## 2018-11-01 ENCOUNTER — COMMUNICATION - HEALTHEAST (OUTPATIENT)
Dept: INTERNAL MEDICINE | Facility: CLINIC | Age: 43
End: 2018-11-01

## 2018-11-02 ENCOUNTER — ANESTHESIA - HEALTHEAST (OUTPATIENT)
Dept: SURGERY | Facility: CLINIC | Age: 43
End: 2018-11-02

## 2018-11-02 ENCOUNTER — COMMUNICATION - HEALTHEAST (OUTPATIENT)
Dept: NEUROSURGERY | Facility: CLINIC | Age: 43
End: 2018-11-02

## 2018-11-05 ENCOUNTER — SURGERY - HEALTHEAST (OUTPATIENT)
Dept: SURGERY | Facility: CLINIC | Age: 43
End: 2018-11-05

## 2018-11-05 ASSESSMENT — MIFFLIN-ST. JEOR: SCORE: 1229.22

## 2018-11-06 ENCOUNTER — COMMUNICATION - HEALTHEAST (OUTPATIENT)
Dept: NEUROSURGERY | Facility: CLINIC | Age: 43
End: 2018-11-06

## 2018-11-06 DIAGNOSIS — G54.2 CERVICAL NERVE ROOT COMPRESSION: ICD-10-CM

## 2018-11-07 ENCOUNTER — COMMUNICATION - HEALTHEAST (OUTPATIENT)
Dept: NEUROSURGERY | Facility: CLINIC | Age: 43
End: 2018-11-07

## 2018-11-08 ENCOUNTER — COMMUNICATION - HEALTHEAST (OUTPATIENT)
Dept: NEUROSURGERY | Facility: CLINIC | Age: 43
End: 2018-11-08

## 2018-11-08 DIAGNOSIS — M54.2 NECK PAIN: ICD-10-CM

## 2018-11-14 ENCOUNTER — AMBULATORY - HEALTHEAST (OUTPATIENT)
Dept: NEUROSURGERY | Facility: CLINIC | Age: 43
End: 2018-11-14

## 2018-11-14 DIAGNOSIS — G54.2 CERVICAL NERVE ROOT COMPRESSION: ICD-10-CM

## 2018-11-19 ENCOUNTER — COMMUNICATION - HEALTHEAST (OUTPATIENT)
Dept: NEUROSURGERY | Facility: CLINIC | Age: 43
End: 2018-11-19

## 2018-11-23 ENCOUNTER — OFFICE VISIT - HEALTHEAST (OUTPATIENT)
Dept: PHYSICAL THERAPY | Facility: CLINIC | Age: 43
End: 2018-11-23

## 2018-11-23 DIAGNOSIS — M43.6 NECK STIFFNESS: ICD-10-CM

## 2018-11-23 DIAGNOSIS — M54.2 NECK PAIN: ICD-10-CM

## 2018-11-23 DIAGNOSIS — M53.82 NECK MUSCLE WEAKNESS: ICD-10-CM

## 2018-11-23 DIAGNOSIS — R29.898 DECREASED ROM OF NECK: ICD-10-CM

## 2018-11-26 ENCOUNTER — AMBULATORY - HEALTHEAST (OUTPATIENT)
Dept: NEUROSURGERY | Facility: CLINIC | Age: 43
End: 2018-11-26

## 2018-11-26 DIAGNOSIS — R49.0 VOICE HOARSENESS: ICD-10-CM

## 2018-11-30 ENCOUNTER — OFFICE VISIT - HEALTHEAST (OUTPATIENT)
Dept: PHYSICAL THERAPY | Facility: CLINIC | Age: 43
End: 2018-11-30

## 2018-11-30 DIAGNOSIS — M54.2 NECK PAIN: ICD-10-CM

## 2018-11-30 DIAGNOSIS — R29.898 DECREASED ROM OF NECK: ICD-10-CM

## 2018-11-30 DIAGNOSIS — M43.6 NECK STIFFNESS: ICD-10-CM

## 2018-11-30 DIAGNOSIS — M53.82 NECK MUSCLE WEAKNESS: ICD-10-CM

## 2018-12-03 ENCOUNTER — HOSPITAL ENCOUNTER (OUTPATIENT)
Dept: RADIOLOGY | Facility: HOSPITAL | Age: 43
Discharge: HOME | End: 2018-12-03
Attending: NEUROLOGICAL SURGERY

## 2018-12-03 ENCOUNTER — OFFICE VISIT - HEALTHEAST (OUTPATIENT)
Dept: NEUROSURGERY | Facility: CLINIC | Age: 43
End: 2018-12-03

## 2018-12-03 DIAGNOSIS — G95.20 CERVICAL CORD COMPRESSION WITH MYELOPATHY (H): ICD-10-CM

## 2018-12-03 DIAGNOSIS — G54.2 CERVICAL NERVE ROOT COMPRESSION: ICD-10-CM

## 2018-12-03 ASSESSMENT — MIFFLIN-ST. JEOR: SCORE: 1226.95

## 2018-12-12 ENCOUNTER — OFFICE VISIT - HEALTHEAST (OUTPATIENT)
Dept: PHYSICAL THERAPY | Facility: CLINIC | Age: 43
End: 2018-12-12

## 2018-12-12 DIAGNOSIS — M53.82 NECK MUSCLE WEAKNESS: ICD-10-CM

## 2018-12-12 DIAGNOSIS — R29.898 DECREASED ROM OF NECK: ICD-10-CM

## 2018-12-12 DIAGNOSIS — M43.6 NECK STIFFNESS: ICD-10-CM

## 2018-12-12 DIAGNOSIS — M54.2 NECK PAIN: ICD-10-CM

## 2018-12-13 ENCOUNTER — COMMUNICATION - HEALTHEAST (OUTPATIENT)
Dept: NEUROSURGERY | Facility: CLINIC | Age: 43
End: 2018-12-13

## 2018-12-19 ENCOUNTER — OFFICE VISIT - HEALTHEAST (OUTPATIENT)
Dept: PHYSICAL THERAPY | Facility: CLINIC | Age: 43
End: 2018-12-19

## 2018-12-19 DIAGNOSIS — R29.898 DECREASED ROM OF NECK: ICD-10-CM

## 2018-12-19 DIAGNOSIS — M53.82 NECK MUSCLE WEAKNESS: ICD-10-CM

## 2018-12-19 DIAGNOSIS — M54.2 NECK PAIN: ICD-10-CM

## 2018-12-19 DIAGNOSIS — M43.6 NECK STIFFNESS: ICD-10-CM

## 2019-01-09 ENCOUNTER — OFFICE VISIT - HEALTHEAST (OUTPATIENT)
Dept: PHYSICAL THERAPY | Facility: CLINIC | Age: 44
End: 2019-01-09

## 2019-01-09 DIAGNOSIS — M53.82 NECK MUSCLE WEAKNESS: ICD-10-CM

## 2019-01-09 DIAGNOSIS — M54.2 NECK PAIN: ICD-10-CM

## 2019-01-09 DIAGNOSIS — M43.6 NECK STIFFNESS: ICD-10-CM

## 2019-01-09 DIAGNOSIS — R29.898 DECREASED ROM OF NECK: ICD-10-CM

## 2019-01-16 ENCOUNTER — OFFICE VISIT - HEALTHEAST (OUTPATIENT)
Dept: PHYSICAL THERAPY | Facility: CLINIC | Age: 44
End: 2019-01-16

## 2019-01-16 DIAGNOSIS — M54.2 NECK PAIN: ICD-10-CM

## 2019-01-16 DIAGNOSIS — R29.898 DECREASED ROM OF NECK: ICD-10-CM

## 2019-01-16 DIAGNOSIS — M53.82 NECK MUSCLE WEAKNESS: ICD-10-CM

## 2019-01-16 DIAGNOSIS — M43.6 NECK STIFFNESS: ICD-10-CM

## 2019-01-30 ENCOUNTER — OFFICE VISIT - HEALTHEAST (OUTPATIENT)
Dept: PHYSICAL THERAPY | Facility: CLINIC | Age: 44
End: 2019-01-30

## 2019-01-30 DIAGNOSIS — R29.898 DECREASED ROM OF NECK: ICD-10-CM

## 2019-01-30 DIAGNOSIS — M43.6 NECK STIFFNESS: ICD-10-CM

## 2019-01-30 DIAGNOSIS — M53.82 NECK MUSCLE WEAKNESS: ICD-10-CM

## 2019-01-30 DIAGNOSIS — M54.2 NECK PAIN: ICD-10-CM

## 2019-03-05 ENCOUNTER — COMMUNICATION - HEALTHEAST (OUTPATIENT)
Dept: NEUROSURGERY | Facility: CLINIC | Age: 44
End: 2019-03-05

## 2019-03-11 ENCOUNTER — AMBULATORY - HEALTHEAST (OUTPATIENT)
Dept: NEUROSURGERY | Facility: CLINIC | Age: 44
End: 2019-03-11

## 2019-03-11 DIAGNOSIS — M79.622 PAIN OF LEFT UPPER ARM: ICD-10-CM

## 2019-03-26 ENCOUNTER — OFFICE VISIT - HEALTHEAST (OUTPATIENT)
Dept: CARDIOLOGY | Facility: CLINIC | Age: 44
End: 2019-03-26

## 2019-03-26 DIAGNOSIS — R00.1 BRADYCARDIA: ICD-10-CM

## 2019-03-26 LAB
CHOLEST SERPL-MCNC: 208 MG/DL
FASTING STATUS PATIENT QL REPORTED: NO
HDLC SERPL-MCNC: 71 MG/DL
LDLC SERPL CALC-MCNC: 76 MG/DL
TRIGL SERPL-MCNC: 304 MG/DL
TSH SERPL DL<=0.005 MIU/L-ACNC: 1.54 UIU/ML (ref 0.3–5)

## 2019-03-26 ASSESSMENT — MIFFLIN-ST. JEOR: SCORE: 1236.02

## 2019-04-01 ENCOUNTER — COMMUNICATION - HEALTHEAST (OUTPATIENT)
Dept: CARDIOLOGY | Facility: CLINIC | Age: 44
End: 2019-04-01

## 2019-04-02 ENCOUNTER — COMMUNICATION - HEALTHEAST (OUTPATIENT)
Dept: CARDIOLOGY | Facility: CLINIC | Age: 44
End: 2019-04-02

## 2019-04-03 ENCOUNTER — HOSPITAL ENCOUNTER (OUTPATIENT)
Dept: CARDIOLOGY | Facility: CLINIC | Age: 44
Discharge: HOME | End: 2019-04-03
Attending: INTERNAL MEDICINE

## 2019-04-03 ENCOUNTER — COMMUNICATION - HEALTHEAST (OUTPATIENT)
Dept: CARDIOLOGY | Facility: CLINIC | Age: 44
End: 2019-04-03

## 2019-04-03 DIAGNOSIS — R00.1 BRADYCARDIA: ICD-10-CM

## 2019-04-03 DIAGNOSIS — I10 HTN (HYPERTENSION): ICD-10-CM

## 2019-04-05 ENCOUNTER — OFFICE VISIT - HEALTHEAST (OUTPATIENT)
Dept: INTERNAL MEDICINE | Facility: CLINIC | Age: 44
End: 2019-04-05

## 2019-04-05 DIAGNOSIS — F33.9 EPISODE OF RECURRENT MAJOR DEPRESSIVE DISORDER, UNSPECIFIED DEPRESSION EPISODE SEVERITY (H): ICD-10-CM

## 2019-04-05 DIAGNOSIS — G95.20 CERVICAL CORD COMPRESSION WITH MYELOPATHY (H): ICD-10-CM

## 2019-04-05 DIAGNOSIS — R53.82 CHRONIC FATIGUE: ICD-10-CM

## 2019-04-05 DIAGNOSIS — G43.709 CHRONIC MIGRAINE WITHOUT AURA WITHOUT STATUS MIGRAINOSUS, NOT INTRACTABLE: ICD-10-CM

## 2019-04-05 DIAGNOSIS — I10 ESSENTIAL HYPERTENSION: ICD-10-CM

## 2019-04-05 DIAGNOSIS — Z00.00 HEALTH MAINTENANCE EXAMINATION: ICD-10-CM

## 2019-04-05 DIAGNOSIS — N84.1 CERVICAL POLYP: ICD-10-CM

## 2019-04-05 DIAGNOSIS — R40.0 DAYTIME SOMNOLENCE: ICD-10-CM

## 2019-04-05 DIAGNOSIS — R10.31 RIGHT INGUINAL PAIN: ICD-10-CM

## 2019-04-05 DIAGNOSIS — Z00.00 ROUTINE HISTORY AND PHYSICAL EXAMINATION OF ADULT: ICD-10-CM

## 2019-04-05 LAB
ALBUMIN SERPL-MCNC: 4 G/DL (ref 3.5–5)
ALP SERPL-CCNC: 58 U/L (ref 45–120)
ALT SERPL W P-5'-P-CCNC: 15 U/L (ref 0–45)
AST SERPL W P-5'-P-CCNC: 21 U/L (ref 0–40)
BASOPHILS # BLD AUTO: 0 THOU/UL (ref 0–0.2)
BASOPHILS NFR BLD AUTO: 1 % (ref 0–2)
BILIRUB DIRECT SERPL-MCNC: 0.3 MG/DL
BILIRUB SERPL-MCNC: 1 MG/DL (ref 0–1)
C REACTIVE PROTEIN LHE: <0.1 MG/DL (ref 0–0.8)
EOSINOPHIL # BLD AUTO: 0.1 THOU/UL (ref 0–0.4)
EOSINOPHIL NFR BLD AUTO: 2 % (ref 0–6)
ERYTHROCYTE [DISTWIDTH] IN BLOOD BY AUTOMATED COUNT: 15.6 % (ref 11–14.5)
HCT VFR BLD AUTO: 39.8 % (ref 35–47)
HGB BLD-MCNC: 12.2 G/DL (ref 12–16)
HIV 1+2 AB+HIV1 P24 AG SERPL QL IA: NEGATIVE
LYMPHOCYTES # BLD AUTO: 1.2 THOU/UL (ref 0.8–4.4)
LYMPHOCYTES NFR BLD AUTO: 41 % (ref 20–40)
MCH RBC QN AUTO: 26.4 PG (ref 27–34)
MCHC RBC AUTO-ENTMCNC: 30.7 G/DL (ref 32–36)
MCV RBC AUTO: 86 FL (ref 80–100)
MONOCYTES # BLD AUTO: 0.3 THOU/UL (ref 0–0.9)
MONOCYTES NFR BLD AUTO: 10 % (ref 2–10)
NEUTROPHILS # BLD AUTO: 1.3 THOU/UL (ref 2–7.7)
NEUTROPHILS NFR BLD AUTO: 46 % (ref 50–70)
PLATELET # BLD AUTO: 261 THOU/UL (ref 140–440)
PMV BLD AUTO: 10.8 FL (ref 8.5–12.5)
PROT SERPL-MCNC: 7.7 G/DL (ref 6–8)
RBC # BLD AUTO: 4.62 MILL/UL (ref 3.8–5.4)
WBC: 2.8 THOU/UL (ref 4–11)

## 2019-04-05 RX ORDER — CETIRIZINE HYDROCHLORIDE 10 MG/1
10 TABLET ORAL DAILY
Status: SHIPPED | COMMUNITY
Start: 2019-04-05

## 2019-04-05 ASSESSMENT — MIFFLIN-ST. JEOR: SCORE: 1209.94

## 2019-04-08 ENCOUNTER — HOSPITAL ENCOUNTER (OUTPATIENT)
Dept: ULTRASOUND IMAGING | Facility: CLINIC | Age: 44
Discharge: HOME | End: 2019-04-08
Attending: INTERNAL MEDICINE

## 2019-04-08 ENCOUNTER — HOSPITAL ENCOUNTER (OUTPATIENT)
Dept: MAMMOGRAPHY | Facility: CLINIC | Age: 44
Discharge: HOME | End: 2019-04-08
Attending: INTERNAL MEDICINE

## 2019-04-08 DIAGNOSIS — N84.1 CERVICAL POLYP: ICD-10-CM

## 2019-04-08 DIAGNOSIS — Z00.00 ROUTINE HISTORY AND PHYSICAL EXAMINATION OF ADULT: ICD-10-CM

## 2019-04-08 DIAGNOSIS — R10.31 RIGHT INGUINAL PAIN: ICD-10-CM

## 2019-04-08 LAB
25(OH)D3 SERPL-MCNC: 44.2 NG/ML (ref 30–80)
25(OH)D3 SERPL-MCNC: 44.2 NG/ML (ref 30–80)
HPV SOURCE: NORMAL
HUMAN PAPILLOMA VIRUS 16 DNA: NEGATIVE
HUMAN PAPILLOMA VIRUS 18 DNA: NEGATIVE
HUMAN PAPILLOMA VIRUS FINAL DIAGNOSIS: NORMAL
HUMAN PAPILLOMA VIRUS OTHER HR: NEGATIVE
SPECIMEN DESCRIPTION: NORMAL

## 2019-04-09 ENCOUNTER — COMMUNICATION - HEALTHEAST (OUTPATIENT)
Dept: CARDIOLOGY | Facility: CLINIC | Age: 44
End: 2019-04-09

## 2019-04-09 LAB — METHYLMALONATE SERPL-SCNC: 0.43 UMOL/L (ref 0–0.4)

## 2019-04-10 ENCOUNTER — AMBULATORY - HEALTHEAST (OUTPATIENT)
Dept: INTERNAL MEDICINE | Facility: CLINIC | Age: 44
End: 2019-04-10

## 2019-04-12 ENCOUNTER — COMMUNICATION - HEALTHEAST (OUTPATIENT)
Dept: INTERNAL MEDICINE | Facility: CLINIC | Age: 44
End: 2019-04-12

## 2019-04-13 ENCOUNTER — COMMUNICATION - HEALTHEAST (OUTPATIENT)
Dept: INTERNAL MEDICINE | Facility: CLINIC | Age: 44
End: 2019-04-13

## 2019-04-13 DIAGNOSIS — F33.1 MODERATE EPISODE OF RECURRENT MAJOR DEPRESSIVE DISORDER (H): ICD-10-CM

## 2019-04-17 ENCOUNTER — AMBULATORY - HEALTHEAST (OUTPATIENT)
Dept: NURSING | Facility: CLINIC | Age: 44
End: 2019-04-17

## 2019-06-28 ENCOUNTER — COMMUNICATION - HEALTHEAST (OUTPATIENT)
Dept: CARDIOLOGY | Facility: CLINIC | Age: 44
End: 2019-06-28

## 2019-06-28 DIAGNOSIS — I10 ESSENTIAL HYPERTENSION: ICD-10-CM

## 2019-07-02 ENCOUNTER — OFFICE VISIT - HEALTHEAST (OUTPATIENT)
Dept: CARDIOLOGY | Facility: CLINIC | Age: 44
End: 2019-07-02

## 2019-07-02 DIAGNOSIS — I10 ESSENTIAL HYPERTENSION: ICD-10-CM

## 2019-07-02 ASSESSMENT — MIFFLIN-ST. JEOR: SCORE: 1205.4

## 2019-07-29 ENCOUNTER — OFFICE VISIT - HEALTHEAST (OUTPATIENT)
Dept: INTERNAL MEDICINE | Facility: CLINIC | Age: 44
End: 2019-07-29

## 2019-07-29 DIAGNOSIS — G54.2 CERVICAL NERVE ROOT COMPRESSION: ICD-10-CM

## 2019-07-29 DIAGNOSIS — Z01.818 PREOP GENERAL PHYSICAL EXAM: ICD-10-CM

## 2019-07-29 LAB
ERYTHROCYTE [DISTWIDTH] IN BLOOD BY AUTOMATED COUNT: 15.2 % (ref 11–14.5)
HCT VFR BLD AUTO: 39.7 % (ref 35–47)
HGB BLD-MCNC: 13 G/DL (ref 12–16)
MCH RBC QN AUTO: 29.3 PG (ref 27–34)
MCHC RBC AUTO-ENTMCNC: 32.6 G/DL (ref 32–36)
MCV RBC AUTO: 90 FL (ref 80–100)
PLATELET # BLD AUTO: 244 THOU/UL (ref 140–440)
PMV BLD AUTO: 7.8 FL (ref 7–10)
RBC # BLD AUTO: 4.42 MILL/UL (ref 3.8–5.4)
WBC: 3.9 THOU/UL (ref 4–11)

## 2019-07-29 ASSESSMENT — MIFFLIN-ST. JEOR: SCORE: 1219.01

## 2019-09-12 ENCOUNTER — HOSPITAL ENCOUNTER (OUTPATIENT)
Dept: RADIOLOGY | Facility: CLINIC | Age: 44
Discharge: HOME | End: 2019-09-12

## 2019-09-12 ENCOUNTER — OFFICE VISIT - HEALTHEAST (OUTPATIENT)
Dept: INTERNAL MEDICINE | Facility: CLINIC | Age: 44
End: 2019-09-12

## 2019-09-12 DIAGNOSIS — M54.2 NECK PAIN: ICD-10-CM

## 2019-09-12 DIAGNOSIS — I10 ESSENTIAL HYPERTENSION: ICD-10-CM

## 2019-09-12 DIAGNOSIS — R55 SYNCOPE, UNSPECIFIED SYNCOPE TYPE: ICD-10-CM

## 2019-09-12 LAB
ANION GAP SERPL CALCULATED.3IONS-SCNC: 12 MMOL/L (ref 5–18)
ATRIAL RATE - MUSE: 80 BPM
BUN SERPL-MCNC: 13 MG/DL (ref 8–22)
CALCIUM SERPL-MCNC: 10 MG/DL (ref 8.5–10.5)
CHLORIDE BLD-SCNC: 101 MMOL/L (ref 98–107)
CO2 SERPL-SCNC: 27 MMOL/L (ref 22–31)
CREAT SERPL-MCNC: 0.71 MG/DL (ref 0.6–1.1)
DIASTOLIC BLOOD PRESSURE - MUSE: NORMAL
ERYTHROCYTE [DISTWIDTH] IN BLOOD BY AUTOMATED COUNT: 12.1 % (ref 11–14.5)
GFR SERPL CREATININE-BSD FRML MDRD: >60 ML/MIN/1.73M2
GLUCOSE BLD-MCNC: 81 MG/DL (ref 70–125)
HCT VFR BLD AUTO: 43.2 % (ref 35–47)
HGB BLD-MCNC: 14.1 G/DL (ref 12–16)
INTERPRETATION ECG - MUSE: NORMAL
MCH RBC QN AUTO: 30.2 PG (ref 27–34)
MCHC RBC AUTO-ENTMCNC: 32.7 G/DL (ref 32–36)
MCV RBC AUTO: 92 FL (ref 80–100)
P AXIS - MUSE: 64 DEGREES
PLATELET # BLD AUTO: 261 THOU/UL (ref 140–440)
PMV BLD AUTO: 7.8 FL (ref 7–10)
POTASSIUM BLD-SCNC: 3.4 MMOL/L (ref 3.5–5)
PR INTERVAL - MUSE: 156 MS
QRS DURATION - MUSE: 94 MS
QT - MUSE: 376 MS
QTC - MUSE: 433 MS
R AXIS - MUSE: 31 DEGREES
RBC # BLD AUTO: 4.68 MILL/UL (ref 3.8–5.4)
SODIUM SERPL-SCNC: 140 MMOL/L (ref 136–145)
SYSTOLIC BLOOD PRESSURE - MUSE: NORMAL
T AXIS - MUSE: 31 DEGREES
VENTRICULAR RATE- MUSE: 80 BPM
VIT B12 SERPL-MCNC: 1051 PG/ML (ref 213–816)
WBC: 3.1 THOU/UL (ref 4–11)

## 2019-09-12 ASSESSMENT — MIFFLIN-ST. JEOR: SCORE: 1219.01

## 2019-09-26 ENCOUNTER — COMMUNICATION - HEALTHEAST (OUTPATIENT)
Dept: INTERNAL MEDICINE | Facility: CLINIC | Age: 44
End: 2019-09-26

## 2019-09-26 ENCOUNTER — OFFICE VISIT - HEALTHEAST (OUTPATIENT)
Dept: INTERNAL MEDICINE | Facility: CLINIC | Age: 44
End: 2019-09-26

## 2019-09-26 DIAGNOSIS — R55 VASOVAGAL SYNCOPE: ICD-10-CM

## 2019-09-26 DIAGNOSIS — G89.18 POSTOPERATIVE PAIN: ICD-10-CM

## 2019-09-26 ASSESSMENT — MIFFLIN-ST. JEOR: SCORE: 1219.01

## 2019-09-27 ENCOUNTER — AMBULATORY - HEALTHEAST (OUTPATIENT)
Dept: INTERNAL MEDICINE | Facility: CLINIC | Age: 44
End: 2019-09-27

## 2019-09-27 DIAGNOSIS — R51.9 NEW ONSET HEADACHE: ICD-10-CM

## 2019-09-27 DIAGNOSIS — R55 VASOVAGAL SYNCOPE: ICD-10-CM

## 2019-09-30 ENCOUNTER — HOSPITAL ENCOUNTER (OUTPATIENT)
Dept: CARDIOLOGY | Facility: HOSPITAL | Age: 44
Discharge: HOME | End: 2019-09-30
Attending: INTERNAL MEDICINE

## 2019-09-30 DIAGNOSIS — R55 VASOVAGAL SYNCOPE: ICD-10-CM

## 2019-09-30 LAB
CV STRESS CURRENT BP HE: NORMAL
CV STRESS CURRENT HR HE: 101
CV STRESS CURRENT HR HE: 102
CV STRESS CURRENT HR HE: 103
CV STRESS CURRENT HR HE: 105
CV STRESS CURRENT HR HE: 105
CV STRESS CURRENT HR HE: 107
CV STRESS CURRENT HR HE: 107
CV STRESS CURRENT HR HE: 120
CV STRESS CURRENT HR HE: 123
CV STRESS CURRENT HR HE: 123
CV STRESS CURRENT HR HE: 124
CV STRESS CURRENT HR HE: 125
CV STRESS CURRENT HR HE: 135
CV STRESS CURRENT HR HE: 141
CV STRESS CURRENT HR HE: 142
CV STRESS CURRENT HR HE: 149
CV STRESS CURRENT HR HE: 151
CV STRESS CURRENT HR HE: 157
CV STRESS CURRENT HR HE: 167
CV STRESS CURRENT HR HE: 167
CV STRESS CURRENT HR HE: 78
CV STRESS CURRENT HR HE: 82
CV STRESS CURRENT HR HE: 88
CV STRESS CURRENT HR HE: 89
CV STRESS CURRENT HR HE: 89
CV STRESS CURRENT HR HE: 90
CV STRESS CURRENT HR HE: 93
CV STRESS CURRENT HR HE: 94
CV STRESS CURRENT HR HE: 95
CV STRESS DEVIATION TIME HE: NORMAL
CV STRESS ECHO PERCENT HR HE: NORMAL
CV STRESS EXERCISE STAGE HE: NORMAL
CV STRESS FINAL RESTING BP HE: NORMAL
CV STRESS FINAL RESTING HR HE: 90
CV STRESS MAX HR HE: 168
CV STRESS MAX TREADMILL GRADE HE: 18
CV STRESS MAX TREADMILL SPEED HE: 5
CV STRESS PEAK DIA BP HE: NORMAL
CV STRESS PEAK SYS BP HE: NORMAL
CV STRESS PHASE HE: NORMAL
CV STRESS PROTOCOL HE: NORMAL
CV STRESS RESTING PT POSITION HE: NORMAL
CV STRESS RESTING PT POSITION HE: NORMAL
CV STRESS ST DEVIATION AMOUNT HE: NORMAL
CV STRESS ST DEVIATION ELEVATION HE: NORMAL
CV STRESS ST EVELATION AMOUNT HE: NORMAL
CV STRESS TEST TYPE HE: NORMAL
CV STRESS TOTAL STAGE TIME MIN 1 HE: NORMAL
ECHO EJECTION FRACTION ESTIMATED: 60 %
STRESS ECHO BASELINE BP: NORMAL
STRESS ECHO BASELINE HR: 77
STRESS ECHO CALCULATED PERCENT HR: 94 %
STRESS ECHO LAST STRESS BP: NORMAL
STRESS ECHO LAST STRESS HR: 167
STRESS ECHO POST ESTIMATED WORKLOAD: 13.5
STRESS ECHO POST EXERCISE DUR MIN: 13
STRESS ECHO POST EXERCISE DUR SEC: 1
STRESS ECHO TARGET HR: 151

## 2019-10-11 ENCOUNTER — HOSPITAL ENCOUNTER (OUTPATIENT)
Dept: MRI IMAGING | Facility: CLINIC | Age: 44
Discharge: HOME | End: 2019-10-11
Attending: INTERNAL MEDICINE

## 2019-10-11 ENCOUNTER — COMMUNICATION - HEALTHEAST (OUTPATIENT)
Dept: HEALTH INFORMATION MANAGEMENT | Facility: CLINIC | Age: 44
End: 2019-10-11

## 2019-10-11 DIAGNOSIS — R55 VASOVAGAL SYNCOPE: ICD-10-CM

## 2019-10-11 DIAGNOSIS — R51.9 NEW ONSET HEADACHE: ICD-10-CM

## 2019-10-14 ENCOUNTER — AMBULATORY - HEALTHEAST (OUTPATIENT)
Dept: INTERNAL MEDICINE | Facility: CLINIC | Age: 44
End: 2019-10-14

## 2019-10-14 ENCOUNTER — COMMUNICATION - HEALTHEAST (OUTPATIENT)
Dept: INTERNAL MEDICINE | Facility: CLINIC | Age: 44
End: 2019-10-14

## 2019-10-14 DIAGNOSIS — I10 ESSENTIAL HYPERTENSION: ICD-10-CM

## 2019-12-05 ENCOUNTER — RECORDS - HEALTHEAST (OUTPATIENT)
Dept: ADMINISTRATIVE | Facility: OTHER | Age: 44
End: 2019-12-05

## 2020-01-27 ENCOUNTER — AMBULATORY - HEALTHEAST (OUTPATIENT)
Dept: LAB | Facility: CLINIC | Age: 45
End: 2020-01-27

## 2020-01-27 ENCOUNTER — COMMUNICATION - HEALTHEAST (OUTPATIENT)
Dept: SCHEDULING | Facility: CLINIC | Age: 45
End: 2020-01-27

## 2020-01-27 DIAGNOSIS — Z79.899 ENCOUNTER FOR LONG-TERM (CURRENT) USE OF HIGH-RISK MEDICATION: ICD-10-CM

## 2020-01-27 LAB
ALBUMIN SERPL-MCNC: 4.2 G/DL (ref 3.5–5)
ALP SERPL-CCNC: 55 U/L (ref 45–120)
ALT SERPL W P-5'-P-CCNC: 16 U/L (ref 0–45)
ANION GAP SERPL CALCULATED.3IONS-SCNC: 13 MMOL/L (ref 5–18)
AST SERPL W P-5'-P-CCNC: 25 U/L (ref 0–40)
BASOPHILS # BLD AUTO: 0 THOU/UL (ref 0–0.2)
BASOPHILS NFR BLD AUTO: 1 % (ref 0–2)
BILIRUB SERPL-MCNC: 0.6 MG/DL (ref 0–1)
BUN SERPL-MCNC: 11 MG/DL (ref 8–22)
CALCIUM SERPL-MCNC: 9.5 MG/DL (ref 8.5–10.5)
CHLORIDE BLD-SCNC: 100 MMOL/L (ref 98–107)
CO2 SERPL-SCNC: 25 MMOL/L (ref 22–31)
CREAT SERPL-MCNC: 0.71 MG/DL (ref 0.6–1.1)
EOSINOPHIL # BLD AUTO: 0.1 THOU/UL (ref 0–0.4)
EOSINOPHIL NFR BLD AUTO: 3 % (ref 0–6)
ERYTHROCYTE [DISTWIDTH] IN BLOOD BY AUTOMATED COUNT: 11.9 % (ref 11–14.5)
FOLATE SERPL-MCNC: >20 NG/ML
GFR SERPL CREATININE-BSD FRML MDRD: >60 ML/MIN/1.73M2
GLUCOSE BLD-MCNC: 84 MG/DL (ref 70–125)
HCT VFR BLD AUTO: 42.2 % (ref 35–47)
HGB BLD-MCNC: 13.6 G/DL (ref 12–16)
IRON SERPL-MCNC: 90 UG/DL (ref 42–175)
LYMPHOCYTES # BLD AUTO: 1.4 THOU/UL (ref 0.8–4.4)
LYMPHOCYTES NFR BLD AUTO: 38 % (ref 20–40)
MCH RBC QN AUTO: 30.2 PG (ref 27–34)
MCHC RBC AUTO-ENTMCNC: 32.1 G/DL (ref 32–36)
MCV RBC AUTO: 94 FL (ref 80–100)
MONOCYTES # BLD AUTO: 0.3 THOU/UL (ref 0–0.9)
MONOCYTES NFR BLD AUTO: 8 % (ref 2–10)
NEUTROPHILS # BLD AUTO: 1.9 THOU/UL (ref 2–7.7)
NEUTROPHILS NFR BLD AUTO: 51 % (ref 50–70)
PLATELET # BLD AUTO: 219 THOU/UL (ref 140–440)
PMV BLD AUTO: 7.8 FL (ref 7–10)
POTASSIUM BLD-SCNC: 3.6 MMOL/L (ref 3.5–5)
PROT SERPL-MCNC: 7.7 G/DL (ref 6–8)
RBC # BLD AUTO: 4.49 MILL/UL (ref 3.8–5.4)
SODIUM SERPL-SCNC: 138 MMOL/L (ref 136–145)
T4 FREE SERPL-MCNC: 1 NG/DL (ref 0.7–1.8)
TSH SERPL DL<=0.005 MIU/L-ACNC: 2.9 UIU/ML (ref 0.3–5)
VIT B12 SERPL-MCNC: 1114 PG/ML (ref 213–816)
WBC: 3.8 THOU/UL (ref 4–11)

## 2020-01-28 LAB
25(OH)D3 SERPL-MCNC: 62.4 NG/ML (ref 30–80)
25(OH)D3 SERPL-MCNC: 62.4 NG/ML (ref 30–80)

## 2020-03-04 ENCOUNTER — RECORDS - HEALTHEAST (OUTPATIENT)
Dept: ADMINISTRATIVE | Facility: OTHER | Age: 45
End: 2020-03-04

## 2020-03-24 ENCOUNTER — COMMUNICATION - HEALTHEAST (OUTPATIENT)
Dept: CARDIOLOGY | Facility: CLINIC | Age: 45
End: 2020-03-24

## 2020-03-24 DIAGNOSIS — I10 HTN (HYPERTENSION): ICD-10-CM

## 2020-03-24 RX ORDER — LOSARTAN POTASSIUM 100 MG/1
TABLET ORAL
Qty: 90 TABLET | Refills: 1 | Status: SHIPPED | OUTPATIENT
Start: 2020-03-24

## 2020-04-05 ENCOUNTER — COMMUNICATION - HEALTHEAST (OUTPATIENT)
Dept: INTERNAL MEDICINE | Facility: CLINIC | Age: 45
End: 2020-04-05

## 2020-04-05 DIAGNOSIS — F33.1 MODERATE EPISODE OF RECURRENT MAJOR DEPRESSIVE DISORDER (H): ICD-10-CM

## 2020-05-27 ENCOUNTER — COMMUNICATION - HEALTHEAST (OUTPATIENT)
Dept: ALLERGY | Facility: CLINIC | Age: 45
End: 2020-05-27

## 2020-05-27 ENCOUNTER — COMMUNICATION - HEALTHEAST (OUTPATIENT)
Dept: INTERNAL MEDICINE | Facility: CLINIC | Age: 45
End: 2020-05-27

## 2020-05-27 ENCOUNTER — COMMUNICATION - HEALTHEAST (OUTPATIENT)
Dept: CARDIOLOGY | Facility: CLINIC | Age: 45
End: 2020-05-27

## 2020-05-29 ENCOUNTER — OFFICE VISIT - HEALTHEAST (OUTPATIENT)
Dept: ALLERGY | Facility: CLINIC | Age: 45
End: 2020-05-29

## 2020-05-29 DIAGNOSIS — J38.3 VOCAL CORD DYSFUNCTION: ICD-10-CM

## 2020-05-29 DIAGNOSIS — T78.1XXA POLLEN-FOOD ALLERGY, INITIAL ENCOUNTER: ICD-10-CM

## 2020-05-29 DIAGNOSIS — J45.990 EXERCISE INDUCED BRONCHOSPASM: ICD-10-CM

## 2020-05-29 DIAGNOSIS — J30.1 SEASONAL ALLERGIC RHINITIS DUE TO POLLEN: ICD-10-CM

## 2020-05-29 RX ORDER — FLUOXETINE 40 MG/1
80 CAPSULE ORAL DAILY
Status: SHIPPED | COMMUNITY
Start: 2020-05-01

## 2020-05-29 ASSESSMENT — MIFFLIN-ST. JEOR: SCORE: 1147.57

## 2020-06-02 ENCOUNTER — AMBULATORY - HEALTHEAST (OUTPATIENT)
Dept: CARDIOLOGY | Facility: CLINIC | Age: 45
End: 2020-06-02

## 2020-06-04 ENCOUNTER — RECORDS - HEALTHEAST (OUTPATIENT)
Dept: ADMINISTRATIVE | Facility: OTHER | Age: 45
End: 2020-06-04

## 2020-06-04 ENCOUNTER — AMBULATORY - HEALTHEAST (OUTPATIENT)
Dept: CARDIOLOGY | Facility: CLINIC | Age: 45
End: 2020-06-04

## 2020-06-15 ENCOUNTER — COMMUNICATION - HEALTHEAST (OUTPATIENT)
Dept: CARDIOLOGY | Facility: CLINIC | Age: 45
End: 2020-06-15

## 2020-07-01 ENCOUNTER — AMBULATORY - HEALTHEAST (OUTPATIENT)
Dept: INTERNAL MEDICINE | Facility: CLINIC | Age: 45
End: 2020-07-01

## 2020-07-01 ENCOUNTER — COMMUNICATION - HEALTHEAST (OUTPATIENT)
Dept: INTERNAL MEDICINE | Facility: CLINIC | Age: 45
End: 2020-07-01

## 2020-07-01 DIAGNOSIS — F50.9 EATING DISORDER, UNSPECIFIED TYPE: ICD-10-CM

## 2020-07-03 ENCOUNTER — AMBULATORY - HEALTHEAST (OUTPATIENT)
Dept: LAB | Facility: CLINIC | Age: 45
End: 2020-07-03

## 2020-07-03 ENCOUNTER — AMBULATORY - HEALTHEAST (OUTPATIENT)
Dept: NURSING | Facility: CLINIC | Age: 45
End: 2020-07-03

## 2020-07-03 DIAGNOSIS — F50.9 EATING DISORDER, UNSPECIFIED TYPE: ICD-10-CM

## 2020-07-03 LAB
ALBUMIN SERPL-MCNC: 4 G/DL (ref 3.5–5)
ALP SERPL-CCNC: 59 U/L (ref 45–120)
ALT SERPL W P-5'-P-CCNC: 38 U/L (ref 0–45)
ANION GAP SERPL CALCULATED.3IONS-SCNC: 12 MMOL/L (ref 5–18)
AST SERPL W P-5'-P-CCNC: 38 U/L (ref 0–40)
BASOPHILS # BLD AUTO: 0 THOU/UL (ref 0–0.2)
BASOPHILS NFR BLD AUTO: 0 % (ref 0–2)
BILIRUB SERPL-MCNC: 0.6 MG/DL (ref 0–1)
BUN SERPL-MCNC: 9 MG/DL (ref 8–22)
CALCIUM SERPL-MCNC: 9.7 MG/DL (ref 8.5–10.5)
CHLORIDE BLD-SCNC: 99 MMOL/L (ref 98–107)
CO2 SERPL-SCNC: 27 MMOL/L (ref 22–31)
CREAT SERPL-MCNC: 0.8 MG/DL (ref 0.6–1.1)
EOSINOPHIL # BLD AUTO: 0.1 THOU/UL (ref 0–0.4)
EOSINOPHIL NFR BLD AUTO: 3 % (ref 0–6)
ERYTHROCYTE [DISTWIDTH] IN BLOOD BY AUTOMATED COUNT: 12.1 % (ref 11–14.5)
GFR SERPL CREATININE-BSD FRML MDRD: >60 ML/MIN/1.73M2
GLUCOSE BLD-MCNC: 82 MG/DL (ref 70–125)
HCT VFR BLD AUTO: 38 % (ref 35–47)
HGB BLD-MCNC: 12.5 G/DL (ref 12–16)
LYMPHOCYTES # BLD AUTO: 1 THOU/UL (ref 0.8–4.4)
LYMPHOCYTES NFR BLD AUTO: 22 % (ref 20–40)
MAGNESIUM SERPL-MCNC: 2 MG/DL (ref 1.8–2.6)
MCH RBC QN AUTO: 29.9 PG (ref 27–34)
MCHC RBC AUTO-ENTMCNC: 32.8 G/DL (ref 32–36)
MCV RBC AUTO: 91 FL (ref 80–100)
MONOCYTES # BLD AUTO: 0.3 THOU/UL (ref 0–0.9)
MONOCYTES NFR BLD AUTO: 7 % (ref 2–10)
NEUTROPHILS # BLD AUTO: 3.1 THOU/UL (ref 2–7.7)
NEUTROPHILS NFR BLD AUTO: 68 % (ref 50–70)
PLATELET # BLD AUTO: 312 THOU/UL (ref 140–440)
PMV BLD AUTO: 6.9 FL (ref 7–10)
POTASSIUM BLD-SCNC: 3.8 MMOL/L (ref 3.5–5)
PROT SERPL-MCNC: 7.6 G/DL (ref 6–8)
RBC # BLD AUTO: 4.17 MILL/UL (ref 3.8–5.4)
SODIUM SERPL-SCNC: 138 MMOL/L (ref 136–145)
TSH SERPL DL<=0.005 MIU/L-ACNC: 1.28 UIU/ML (ref 0.3–5)
WBC: 4.5 THOU/UL (ref 4–11)

## 2020-07-08 ENCOUNTER — COMMUNICATION - HEALTHEAST (OUTPATIENT)
Dept: INTERNAL MEDICINE | Facility: CLINIC | Age: 45
End: 2020-07-08

## 2020-07-08 DIAGNOSIS — R45.851 SUICIDAL IDEATION: ICD-10-CM

## 2020-07-14 ENCOUNTER — COMMUNICATION - HEALTHEAST (OUTPATIENT)
Dept: LAB | Facility: CLINIC | Age: 45
End: 2020-07-14

## 2020-07-14 ENCOUNTER — AMBULATORY - HEALTHEAST (OUTPATIENT)
Dept: INTERNAL MEDICINE | Facility: CLINIC | Age: 45
End: 2020-07-14

## 2020-07-14 DIAGNOSIS — R63.0 ANOREXIA: ICD-10-CM

## 2020-07-16 ENCOUNTER — RECORDS - HEALTHEAST (OUTPATIENT)
Dept: ADMINISTRATIVE | Facility: OTHER | Age: 45
End: 2020-07-16

## 2020-07-17 ENCOUNTER — RECORDS - HEALTHEAST (OUTPATIENT)
Dept: ADMINISTRATIVE | Facility: OTHER | Age: 45
End: 2020-07-17

## 2020-07-21 ENCOUNTER — RECORDS - HEALTHEAST (OUTPATIENT)
Dept: ADMINISTRATIVE | Facility: OTHER | Age: 45
End: 2020-07-21

## 2020-07-23 ENCOUNTER — COMMUNICATION - HEALTHEAST (OUTPATIENT)
Dept: ALLERGY | Facility: CLINIC | Age: 45
End: 2020-07-23

## 2020-07-26 ENCOUNTER — COMMUNICATION - HEALTHEAST (OUTPATIENT)
Dept: INTERNAL MEDICINE | Facility: CLINIC | Age: 45
End: 2020-07-26

## 2020-08-03 ENCOUNTER — RECORDS - HEALTHEAST (OUTPATIENT)
Dept: ADMINISTRATIVE | Facility: OTHER | Age: 45
End: 2020-08-03

## 2020-10-27 ENCOUNTER — COMMUNICATION - HEALTHEAST (OUTPATIENT)
Dept: INTERNAL MEDICINE | Facility: CLINIC | Age: 45
End: 2020-10-27

## 2020-10-27 DIAGNOSIS — I10 ESSENTIAL HYPERTENSION: ICD-10-CM

## 2020-10-28 RX ORDER — TRIAMTERENE/HYDROCHLOROTHIAZID 37.5-25 MG
TABLET ORAL
Qty: 90 TABLET | Refills: 3 | Status: SHIPPED | OUTPATIENT
Start: 2020-10-28 | End: 2022-01-16

## 2021-05-25 ENCOUNTER — RECORDS - HEALTHEAST (OUTPATIENT)
Dept: ADMINISTRATIVE | Facility: CLINIC | Age: 46
End: 2021-05-25

## 2021-05-26 ENCOUNTER — RECORDS - HEALTHEAST (OUTPATIENT)
Dept: ADMINISTRATIVE | Facility: CLINIC | Age: 46
End: 2021-05-26

## 2021-05-27 ENCOUNTER — RECORDS - HEALTHEAST (OUTPATIENT)
Dept: ADMINISTRATIVE | Facility: CLINIC | Age: 46
End: 2021-05-27

## 2021-05-27 NOTE — TELEPHONE ENCOUNTER
RN cannot approve Refill Request    RN can NOT refill this medication Allergic/contraindication. Last office visit: 5/22/2018 Liss Cornell MD Last Physical: 4/25/2018 Last MTM visit: Visit date not found Last visit same specialty: 9/19/2018 Tono Calvillo MD.  Next visit within 3 mo: Visit date not found  Next physical within 3 mo: Visit date not found      Yovana Friedman, [facility] Triage/Med Refill 4/13/2019    Requested Prescriptions   Pending Prescriptions Disp Refills     citalopram (CELEXA) 20 MG tablet [Pharmacy Med Name: CITALOPRAM HBR 20 MG TABLET] 90 tablet 3     Sig: TAKE 1 TABLET BY MOUTH EVERY DAY       [facility] Refill Protocol  Passed - 4/13/2019  8:37 AM        Passed - PCP or prescribing provider visit in last year     Last office visit with prescriber/PCP: 5/22/2018 Liss Cornell MD OR same dept: 5/22/2018 Liss Cornell MD OR same specialty: 9/19/2018 Tono Calvillo MD  Last physical: 4/25/2018 Last MTM visit: Visit date not found   Next visit within 3 mo: Visit date not found  Next physical within 3 mo: Visit date not found  Prescriber OR PCP: Liss Cornell MD  Last diagnosis associated with med order: There are no diagnoses linked to this encounter.  If protocol passes may refill for 12 months if within 3 months of last provider visit (or a total of 15 months).

## 2021-05-27 NOTE — TELEPHONE ENCOUNTER
Patient Returning Call  Reason for call:  Patient calling back  Information relayed to patient:  Relayed below message from provider  Patient has additional questions:  No  If YES, what are your questions/concerns:  Transferred patient to scheduling to make an appointment.  Okay to leave a detailed message?: No call back needed

## 2021-05-27 NOTE — TELEPHONE ENCOUNTER
Called and spoke with pharmacy staff whom confirm that the combination medication is on back order. Informed that if the combination medication was  into 2 prescriptions, the medication can be refilled. Will reach out to the patient to discuss/inform,. MAYURI,RN

## 2021-05-27 NOTE — TELEPHONE ENCOUNTER
----- Message from Maru Starks MD sent at 4/10/2019 10:35 AM CDT -----  She will need B12 shots  I sent her a result note. Please have her schedule a nurse only appointment to have them once every month for three months then every three months

## 2021-05-27 NOTE — TELEPHONE ENCOUNTER
=View-only below this line===    ----- Message -----  From: Jason Dao DO  Sent: 4/3/2019   8:46 AM  To: Mayo Sands RN    Switch to losartan 100 mg daily and HCTZ 25 mg daily. D/C Hyzaar. Iindication: HTN

## 2021-05-27 NOTE — TELEPHONE ENCOUNTER
Results and recommendations reviewed with patient. No new issues or concerns.  Call the clinic back if any new issues or concerns arise. Pt verbalized understanding. MAYURIRN

## 2021-05-27 NOTE — TELEPHONE ENCOUNTER
Please see provider message below.  Should pt call inform her of need for B12 injections and determine when each month she would like to be scheduled as stated below by provider.  Please help get started. Jia Jacobson CMA ([facility]) 2:53 PM

## 2021-05-27 NOTE — TELEPHONE ENCOUNTER
----- Message from Elana Rooney MD sent at 4/8/2019  5:21 PM CDT -----  Normal monitor. I recommend she stop the HCTZ as it is probably dehydrating her and contributing to her lightheadedness. She should wear compression socks, stay well hydrated, and see me in 3 months to see how she is doing.     EG

## 2021-05-27 NOTE — TELEPHONE ENCOUNTER
----- Message -----  From: Jason Dao DO  Sent: 4/3/2019   8:46 AM  To: Mayo Sands RN    Switch to losartan 100 mg daily and HCTZ 25 mg daily. D/C Hyzaar. Iindication: HTN

## 2021-05-27 NOTE — TELEPHONE ENCOUNTER
----- Message -----  From: Ellie Echols  Sent: 4/1/2019   8:42 AM  To: Elana Rooney MD  Subject: Question about medications                       ----- Message from Sol Argueta sent at 4/1/2019  8:42 AM CDT -----    I tried to refill my Losartan and found out that it's on back order due to a recall. I took my last pill this morning, and am just wondering what you suggest.  Thanks,  Ellie Echols

## 2021-05-27 NOTE — PROGRESS NOTES
Office Visit - Physical   Ellie Echols   43 y.o.  female    Date of visit: 4/5/2019  Physician: Maru Starks MD     Assessment and Plan   1. Routine history and physical examination of adult  Very pleasant patient here to establish care . Blood sugar , lipids and TSH already done and good . Has hypertriglyceridemia . Due for tetanus   - Mammo Screening Bilateral; Future    2. Hypertension  Had preeclampsia with all pregnancies . Started meds permanently after last pregnancy       3. Episode of recurrent major depressive disorder, unspecified depression episode severity (H)  Postpartum depression , cannot taper off medication . Feels significant withdrawal  , tried slow taper.    4. Chronic migraine without aura without status migrainosus, not intractable  In remission     5. Daytime somnolence  Quiet significant . Does feel she meds more sleep than most but is always waking up unrefreshed and is taking naps in the day which is unusual for her . I do think her symptoms merit sleep study   - Ambulatory referral to Sleep Medicine    6. Cervical cord compression with myelopathy (H)  Follows neurosurgery   laminoplasty may 2018    cervical disc replacement in nov 2018   Still has intermittent neck pain but no whole body pain   7. Health maintenance examination  Pap unknown will do today    mammogram no prior will start baseline this year m could do alternate years   Colon 2018 next ten years or age 51     8. Chronic fatigue  Differential diagnosis discussed . Depression is diagnosis of exclusion . Sleep apnea has to be ruled out . unlikely chronic fatigue syndrome as she is so functional . Had abnormal LFTS and low white count , will repeat some tests and monitor . May need further work up of liver   - HM1(CBC and Differential)  - Vitamin D, Total (25-Hydroxy)  - Hepatic Profile  - C-Reactive Protein(CRP)  - Methylmalonic Acid,Serum or Plasma (Vitamin B12 Status)  - HM1 (CBC with Diff)  - HIV  Antigen/Antibody Screening [place]    9. Cervical polyp  Prominent asymptomatic . Consider referral for removal if shows on US   - US Pelvis With Transvaginal Non OB; Future    10. Right inguinal pain  No inducible palpable hernia will get US done   - US Hernia Evaluation; Future          Return in about 3 months (around 2019) for Recheck.     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        Past Medical History   Past Medical History:   Diagnosis Date     Asthma      Bladder infection      Cervical cord compression with myelopathy (H)      Chronic back pain      Depression      Foraminal stenosis of cervical region      GERD (gastroesophageal reflux disease)      Hypertension      Internal hemorrhoids      Kidney stone      Migraine      Nephrolithiasis      Painful swelling of joint      PROBLEM LIST  Patient Active Problem List   Diagnosis     Lower Back Pain     Nephrolithiasis     Hypertension     Migraine Headache     Internal Hemorrhoids     Chronic Major Depression     Esophageal reflux     Urinary calculus, unspecified     Foraminal stenosis of cervical region     Cervical cord compression with myelopathy (H)     Cervical nerve root compression         Past Surgical History  She has a past surgical history that includes pr cystourethroscopy; Carpal tunnel release (Right);  section; Ureteroscopy; Tubal ligation; and Laminoplasty (Left, 2018).     History of Present Illness   This 43 y.o. old    Has some night sweats, daytime somelence , periods are regular , changed lifestyle about a year ago , went to allergy clinic at [employer] and diagnosed with food allergies , lost 40lbs ,  started running,   feels better after exercise  Has some neck pain , no other body pain . But feels exhausted and sleepy all the time . Denies significant anxiety or depression does work full time and every other weekend .     Review of Systems: A comprehensive review of systems was negative except as noted.     Medications and Allergies   Current Outpatient Medications   Medication Sig Dispense Refill     albuterol (VENTOLIN HFA) 90 mcg/actuation inhaler Inhale 2 puffs every 6 (six) hours as needed for wheezing. 18 g 5     cetirizine (ZYRTEC) 10 MG tablet Take 10 mg by mouth daily.       cholecalciferol, vitamin D3, (VITAMIN D3) 2,000 unit Tab Take by mouth.       citalopram (CELEXA) 20 MG tablet TAKE 1 TABLET BY MOUTH EVERY DAY 90 tablet 5     hydroCHLOROthiazide (HYDRODIURIL) 25 MG tablet Take 1 tablet (25 mg total) by mouth daily. 90 tablet 3     losartan (COZAAR) 100 MG tablet Take 1 tablet (100 mg total) by mouth daily. 90 tablet 3     MULTIVIT-MINERALS/FERROUS FUM (MULTI VITAMIN ORAL) Take 1 tablet by mouth daily.       acetaminophen (TYLENOL) 325 MG tablet Take 2 tablets (650 mg total) by mouth every 6 (six) hours as needed for pain. (Patient taking differently: Take 500 mg by mouth every 6 (six) hours as needed for pain .      )  0     EPINEPHrine (AUVI-Q) 0.3 mg/0.3 mL injection Inject into thigh for allergic reaction 2 Pre-filled Pen Syringe 0     No current facility-administered medications for this visit.      Allergies   Allergen Reactions     Sublimity Hives     Banana Hives and Nausea Only     Minocycline Hives     Peanut Hives     Tree Nuts Hives     Including peanuts     [place] Hives     Avocado Hives     Dairy      Egg      Red Wine Extract      Wheat      Compazine [Prochlorperazine] Anxiety        Family and Social History   Family History   Adopted: Yes   Family history unknown: Yes        Social History     Tobacco Use     Smoking status: Former Smoker     Packs/day: 0.25     Years: 1.00     Pack years: 0.25      "Last attempt to quit: 2013     Years since quittin.2     Smokeless tobacco: Never Used   Substance Use Topics     Alcohol use: Yes     Comment: rare     Drug use: No     Social History     Social History Narrative    She is a  and 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        Physical Exam   General Appearance:       /78 (Patient Site: Right Arm, Patient Position: Sitting)   Pulse 68   Ht 5' 2.5\" (1.588 m)   Wt 132 lb (59.9 kg)   SpO2 98%   BMI 23.76 kg/m      NECK: Neck appearance was normal. There were no neck masses and the thyroid was not enlarged.  RESPIRATORY: Breathing pattern was normal and the chest moved symmetrically.  Percussion/auscultatory percussion was normal.  Lung sounds were normal and there were no abnormal sounds.  CARDIOVASCULAR: Heart rate and rhythm were normal.  S1 and S2 were normal and there were no extra sounds or murmurs. Peripheral pulses in arms and legs were normal.  Jugular venous pressure was normal.  There was no peripheral edema.  GASTROINTESTINAL: The abdomen was normal in contour.  Bowel sounds were present.  Percussion detected no organ enlargement or tenderness.  Palpation detected no tenderness, mass, or enlarged organs.   MUSCULOSKELETAL: Skeletal configuration was normal and muscle mass was normal for age. Joint appearance was overall normal.  LYMPHATIC: There were no enlarged nodes.  SKIN/HAIR/NAILS: Skin color was normal.  There were no skin lesions.  Hair and nails were normal.  NEUROLOGIC: The patient was alert and oriented to person, place, time, and circumstance. Speech was normal. Cranial nerves were normal. Motor strength was normal for age. The patient was normally coordinated.  PSYCHIATRIC:  Mood and affect were normal and the patient had normal recent and remote memory. The patient's judgment and insight were normal.    ADDITIONAL VITAL SIGNS:   CHEST " WALL/BREASTS: NAD no suspicious lumps     RECTAL:   GENITAL/URINARY: Pelvic: cervix normal in appearance but large polyp in os , external genitalia normal, no adnexal masses or tenderness, no cervical motion tenderness, rectovaginal septum normal, uterus normal size, shape, and consistency and vagina normal without discharge       Additional Information        Maru Starks MD  Internal Medicine  Contact me at None

## 2021-05-27 NOTE — PROGRESS NOTES
Thank you for asking the [facility] team to see Ellie Echols in consultation.      Assessment/Plan:   Orthostasis - compression socks, hydration and high salt diet recommended.    Bradycardia - will have her wear a 24 hour monitor to look for any arrhythmia. Heart rate is normal and the rhythm is regular today. EKG x3 reviewed in the computer, all with NSR.     Sleepiness - check TSH. Low suspicion of sleep apnea.     F/U 3 months       Current History:   Ellie Echols is a 43 y.o. with pre-eclampsia during her 5 pregnancies who has lost 40 lbs over the past year with diet modification (largely due to allergies) and running. She runs most days of the week at least 4 miles at a time. She has noted feeling very tired and sleeping up to 12 hours at a time. She also notes dizziness when she stands up from bending over or a squatted position. Recently, she has been wearing a fit bit type of device and her heart rates will go down to the 40s at times. Ellie feels well when she runs, outside of occasional fluttering sensation in her chest when running. She does have some difficulty with upper airway restriction since her c-spine surgeries.     There is no pnd, orthopnea, edema. No syncope. She does get a spinning sensation sometimes when she rolls over in bed. Her periods are normal. She is taking some vitamin supplements OTC. She is adopted.    Past Medical History:     Past Medical History:   Diagnosis Date     Asthma      Bladder infection      Cervical cord compression with myelopathy (H)      Chronic back pain      Depression      Foraminal stenosis of cervical region      GERD (gastroesophageal reflux disease)      Hypertension      Internal hemorrhoids      Kidney stone      Migraine      Nephrolithiasis      Painful swelling of joint        Past Surgical History:     Past Surgical History:   Procedure Laterality Date     CARPAL TUNNEL RELEASE Right       SECTION      five      "LAMINOPLASTY Left 5/7/2018    Procedure: CERVICAL 4 - CERVICAL 7 OPEN LEFT LAMINOPLASTY ;  Surgeon: Yu Schofield MD;  Location: [facility] OR;  Service:      LA CYSTOURETHROSCOPY      Description: Cystoscopy (For Therapy);  Proc Date: 06/15/2012;  Comments: Performed at [facility]     TUBAL LIGATION       URETEROSCOPY         Family History:     Family History   Adopted: Yes   Family history unknown: Yes       Social History:    reports that she quit smoking about 6 years ago. She has a 0.25 pack-year smoking history. she has never used smokeless tobacco. She reports that she drinks alcohol. She reports that she does not use drugs.    Meds:     Current Outpatient Medications   Medication Sig     albuterol (VENTOLIN HFA) 90 mcg/actuation inhaler Inhale 2 puffs every 6 (six) hours as needed for wheezing.     citalopram (CELEXA) 20 MG tablet TAKE 1 TABLET BY MOUTH EVERY DAY     EPINEPHrine (AUVI-Q) 0.3 mg/0.3 mL injection Inject into thigh for allergic reaction     losartan-hydrochlorothiazide (HYZAAR) 100-25 mg per tablet TAKE 1 TABLET BY MOUTH DAILY.     MULTIVIT-MINERALS/FERROUS FUM (MULTI VITAMIN ORAL) Take 1 tablet by mouth daily.     acetaminophen (TYLENOL) 325 MG tablet Take 2 tablets (650 mg total) by mouth every 6 (six) hours as needed for pain. (Patient taking differently: Take 500 mg by mouth every 6 (six) hours as needed for pain .      )       Allergies:   [place]; Banana; Minocycline; Peanut; Tree nuts; [place]; Avocado; Red wine extract; and Compazine [prochlorperazine]    Review of Systems:   Review of Systems:   General: Night Sweats  Eyes: WNL  Ears/Nose/Throat: WNL  Lungs: WNL  Heart: WNL  Stomach: WNL  Bladder: WNL  Muscle/Joints: WNL  Skin: WNL  Nervous System: Daytime Sleepiness, Dizziness  Mental Health: WNL             Objective:      Physical Exam  @LASTENCWT:3@  5' 3\" (1.6 m)  @BMI:3@  /70 (Patient Site: Left Arm, Patient Position: Sitting, Cuff Size: Adult Regular)   Pulse 76   " "Resp 16   Ht 5' 3\" (1.6 m)   Wt 136 lb (61.7 kg)   BMI 24.09 kg/m      General Appearance:   Alert, cooperative and in no acute distress.   HEENT:  No scleral icterus; the mucous membranes were pink and moist.   Neck: JVP flat. No thyromegaly. No HJR   Chest: The spine was straight. The chest was symmetric.   Lungs:   Respirations unlabored; the lungs are clear to auscultation.   Cardiovascular:   S1 and S2 normal and without murmur. No clicks or rubs. No carotid bruits noted. Right DP, PT, and radial pulses 2+. Left DP, PT, and radial pulses 2+.   Abdomen:  No organomegaly, masses, bruits, or tenderness. Bowels sounds are present   Extremities: No cyanosis, clubbing, or edema.   Skin: No xanthelasma.   Neurologic: Mood and affect are appropriate.         Lab Review   Lab Results   Component Value Date     10/31/2018     04/25/2018     01/30/2017    K 3.9 10/31/2018    K 3.5 05/08/2018    K 3.8 04/25/2018     10/31/2018     04/25/2018     01/30/2017    CO2 23 10/31/2018    CO2 23 04/25/2018    CO2 24 01/30/2017    BUN 13 10/31/2018    BUN 12 04/25/2018    BUN 14 01/30/2017    CREATININE 0.70 10/31/2018    CREATININE 0.61 05/08/2018    CREATININE 0.63 04/25/2018    CALCIUM 9.6 10/31/2018    CALCIUM 9.2 04/25/2018    CALCIUM 9.6 01/30/2017     Lab Results   Component Value Date    WBC 3.0 (L) 04/25/2018    WBC 3.9 (L) 01/30/2017    WBC 5.1 01/18/2016    WBC 4.2 06/11/2014    HGB 12.1 10/31/2018    HGB 11.7 (L) 05/08/2018    HGB 13.8 04/25/2018    HCT 41.1 04/25/2018    HCT 42.0 01/30/2017    HCT 37.7 01/18/2016    MCV 88 04/25/2018    MCV 87 01/30/2017    MCV 76 (L) 01/18/2016     04/25/2018     01/30/2017     01/18/2016     No results found for: CHOL, TRIG, HDL, LDL, LDLDIRECT  No results found for: BNP      Elana Rooney M.D.  "

## 2021-05-27 NOTE — TELEPHONE ENCOUNTER
Patient seen in clinic with EMG on 3/26/19. Called to refill her Hyzaar. Was told Hyzaar on back order, and not able to be filled. Pt PCP(last OV 5/2018) whom originally ordered medication no longer is patient PCP, as she left the clinic for a different roll. Pt calling to see if medication can be refilled.     Dr. Dao in the absence of Dr. Rooney may I send in the prescription for the patient; as recommended by pharmacy for 2 separate Rx's to bypass Hyzaar back order?   Thank you, CMALONSO,RN

## 2021-05-27 NOTE — PATIENT INSTRUCTIONS - HE
- Check thyroid and cholesterol today    - Wear compression socks. Put them on first thing in the AM and take them off before bed.     - Try to drink 62 oz of water first thing in the morning. Drink another 62 oz throughout the day.     - High salt diet    - See me in 3 months

## 2021-05-28 ENCOUNTER — RECORDS - HEALTHEAST (OUTPATIENT)
Dept: ADMINISTRATIVE | Facility: CLINIC | Age: 46
End: 2021-05-28

## 2021-05-28 ASSESSMENT — ASTHMA QUESTIONNAIRES
ACT_TOTALSCORE: 16
ACT_TOTALSCORE: 25

## 2021-05-28 NOTE — PROGRESS NOTES
[facility] Discharge Summary  Patient Name: Ellie Echols  Date: 5/15/2019  Referral Diagnosis:   Cervical nerve root compression [G54.2]  - Primary   Mobi-C disc replacement C6-7 on 11/5/18  Referring provider: Yu Schofield MDVisit Diagnosis:   1. Neck pain     2. Neck stiffness     3. Neck muscle weakness     4. Decreased ROM of neck         Goal Status:  Pt. will be independent with home exercise program in : 4 weeks Met  Pt. will report decreased intensity, frequency of : in 4 weeks;Comment  Comment:: 50% Previously had improved, new flare of pain in last PT session.  Patient will decrease : NDI score;for improved quality of function;in 6 weeks;by _ points  by ___ points: 10% NT  Pt will: be able to return to running without increased pain in 10 weeks: Met  Pt will: be able to drive for > 30 minutes without increased pain in 8 weeks: Met    Patient was seen for 7 visits from 11/23/18 to 1/30/19 with - missed appointments.    The patient met goals and has demonstrated understanding of and independence in the home program for self-care, and progression to next steps.  She will initiate contact if questions or concerns arise.    Therapy will be discontinued at this time.  The patient will need a new referral to resume.    Thank you for your referral.  Nikolai MENDIETA  5/15/2019  11:53 AM

## 2021-05-30 VITALS — HEIGHT: 62 IN | BODY MASS INDEX: 29.81 KG/M2 | WEIGHT: 162 LBS

## 2021-05-30 NOTE — PROGRESS NOTES
Thank you for asking the [facility] team to see Ellie Echols.      Assessment/Plan:   Hypertension - controlled with three agents currently. I reviewed that she has previously complained of sleeping 12 hours at night and that I didn't think that the amlodipine was to blame.     Orthostasis - resolved with going back to eating meat    F/U with PCP     Current History:   Ellie Echols is a 43 y.o. with pre-eclampsia during her 5 pregnancies who has lost 40 lbs over the past year with diet modification (vegan, largely due to allergies) and running. She runs most days of the week at least 4 miles at a time. She noted feeling very tired and sleeping up to 12 hours at a time and dizziness when standing up from a squatted position. Recently, she has been wearing a fit bit type of device and her heart rates will go down to the 40s at times. Ellie feels well when she runs. Holter showed no significant bradycardia. Initially we stopped her HCTZ due to dehydration and she noted abdominal discomfort, bloating and blurred vision when she tried to stop it. Last week she called in with hypertension and we added amlodipine at night and her blood pressures have been better controlled.    Ellie returns for f/u today. She feels like she slept more at night this weekend and wonders if it is the amlodipine. She started eating some meat again and notes her dizziness/orthostasis has resolved. Ellie continues to run and is otherwise doing well. She is planning a cervical fusion for her chronic neck pain.    Past Medical History:     Past Medical History:   Diagnosis Date     Asthma      Bladder infection      Cervical cord compression with myelopathy (H)      Chronic back pain      Depression      Foraminal stenosis of cervical region      GERD (gastroesophageal reflux disease)      Hypertension      Internal hemorrhoids      Kidney stone      Migraine      Nephrolithiasis      Painful swelling of joint         Past Surgical History:     Past Surgical History:   Procedure Laterality Date     CARPAL TUNNEL RELEASE Right       SECTION      five     LAMINOPLASTY Left 2018    Procedure: CERVICAL 4 - CERVICAL 7 OPEN LEFT LAMINOPLASTY ;  Surgeon: Yu Schofield MD;  Location: [facility];  Service:      HI CYSTOURETHROSCOPY      Description: Cystoscopy (For Therapy);  Proc Date: 06/15/2012;  Comments: Performed at [facility]     TUBAL LIGATION       URETEROSCOPY         Family History:     Family History   Adopted: Yes   Family history unknown: Yes       Social History:    reports that she quit smoking about 6 years ago. She has a 0.25 pack-year smoking history. She has never used smokeless tobacco. She reports that she drinks alcohol. She reports that she does not use drugs.    Meds:     Current Outpatient Medications   Medication Sig     albuterol (VENTOLIN HFA) 90 mcg/actuation inhaler Inhale 2 puffs every 6 (six) hours as needed for wheezing.     amLODIPine (NORVASC) 5 MG tablet Take 1 tablet (5 mg total) by mouth daily.     cetirizine (ZYRTEC) 10 MG tablet Take 10 mg by mouth daily.     cholecalciferol, vitamin D3, (VITAMIN D3) 2,000 unit Tab Take by mouth.     citalopram (CELEXA) 20 MG tablet TAKE 1 TABLET BY MOUTH EVERY DAY     EPINEPHrine (AUVI-Q) 0.3 mg/0.3 mL injection Inject into thigh for allergic reaction     hydroCHLOROthiazide (HYDRODIURIL) 25 MG tablet Take 25 mg by mouth daily.     losartan (COZAAR) 100 MG tablet Take 1 tablet (100 mg total) by mouth daily.     MULTIVIT-MINERALS/FERROUS FUM (MULTI VITAMIN ORAL) Take 1 tablet by mouth daily.     acetaminophen (TYLENOL) 325 MG tablet Take 2 tablets (650 mg total) by mouth every 6 (six) hours as needed for pain. (Patient taking differently: Take 500 mg by mouth every 6 (six) hours as needed for pain .      )       Allergies:   [place]; Banana; Minocycline; Peanut; Tree nuts; [place]; Avocado; Dairy; Egg; Red wine extract; Wheat; and Compazine  "[prochlorperazine]    Review of Systems:   Review of Systems:   General: WNL  Eyes: WNL  Ears/Nose/Throat: WNL  Lungs: WNL  Heart: WNL  Stomach: WNL  Bladder: WNL  Muscle/Joints: WNL  Skin: WNL  Nervous System: WNL  Mental Health: WNL     Blood: WNL       Objective:      Physical Exam  @LASTENCWT:3@  5' 2.5\" (1.588 m)  @BMI:3@  /82 (Patient Site: Left Arm, Patient Position: Sitting, Cuff Size: Adult Regular)   Pulse 80   Resp 16   Ht 5' 2.5\" (1.588 m)   Wt 131 lb (59.4 kg)   BMI 23.58 kg/m      General Appearance:   Alert, cooperative and in no acute distress.   HEENT:  No scleral icterus; the mucous membranes were pink and moist.   Neck: JVP flat. No thyromegaly. No HJR   Chest: The spine was straight. The chest was symmetric.   Lungs:   Respirations unlabored; the lungs are clear to auscultation.   Cardiovascular:   S1 and S2 normal and without murmur. No clicks or rubs. No carotid bruits noted. Right DP, PT, and radial pulses 2+. Left DP, PT, and radial pulses 2+.   Abdomen:  No organomegaly, masses, bruits, or tenderness. Bowels sounds are present   Extremities: No cyanosis, clubbing, or edema.   Skin: No xanthelasma.   Neurologic: Mood and affect are appropriate.         Lab Review   Lab Results   Component Value Date     10/31/2018     04/25/2018     01/30/2017    K 3.9 10/31/2018    K 3.5 05/08/2018    K 3.8 04/25/2018     10/31/2018     04/25/2018     01/30/2017    CO2 23 10/31/2018    CO2 23 04/25/2018    CO2 24 01/30/2017    BUN 13 10/31/2018    BUN 12 04/25/2018    BUN 14 01/30/2017    CREATININE 0.70 10/31/2018    CREATININE 0.61 05/08/2018    CREATININE 0.63 04/25/2018    CALCIUM 9.6 10/31/2018    CALCIUM 9.2 04/25/2018    CALCIUM 9.6 01/30/2017     Lab Results   Component Value Date    WBC 2.8 (L) 04/05/2019    WBC 3.0 (L) 04/25/2018    WBC 3.9 (L) 01/30/2017    WBC 4.2 06/11/2014    HGB 12.2 04/05/2019    HGB 12.1 10/31/2018    HGB 11.7 (L) 05/08/2018    " HCT 39.8 04/05/2019    HCT 41.1 04/25/2018    HCT 42.0 01/30/2017    MCV 86 04/05/2019    MCV 88 04/25/2018    MCV 87 01/30/2017     04/05/2019     04/25/2018     01/30/2017     Lab Results   Component Value Date    CHOL 208 (H) 03/26/2019    TRIG 304 (H) 03/26/2019    HDL 71 03/26/2019     No results found for: BNP      Elana Rooney M.D.

## 2021-05-30 NOTE — PATIENT INSTRUCTIONS - HE
- Stay on amlodipine at night for a couple of months.  I don't know that the sleeping more at night is related to the medication    - No other changes for now    - If your blood pressure is okay you can f/u with your primary.  Please call with any concerns.

## 2021-05-30 NOTE — TELEPHONE ENCOUNTER
"Return call patient to discuss concerns. Patient reports she is preparing for upcoming neck surgery. While in clinic yesterday BP was elevated at 159/107 and patient reports feeling \"yucky\". Patient described symptoms of fatigue, headache and nausea. She also notes she has quite a significant neck pain which she admits is probably contributing to her hypertension and symptoms.     Dr. Rooney, patient reports she has scheduled and appointment with you on 7/3. She is currently taking Losartan 100 mg daily and restarted Hctz 25 mg on her own because she did not like the way she felt when she d/c'd med. Do you have any other recommendations for patient in the meantime? -ejb  "

## 2021-05-30 NOTE — TELEPHONE ENCOUNTER
Called patient and reviewed recommendations per Dr. Rooney. Patient verbalized understanding and agreement with plan. Rx for amlodipine 5 mg sent to patient pharmacy. Patient voiced no other questions or concerns. -ejb    ----- Message -----  From: Elana Rooney MD  Sent: 6/28/2019  11:39 AM  To: Nikki Almanza RN    Add amlodipine 5mg daily. Will see how she is doing next week.     Thanks, EG

## 2021-05-30 NOTE — PROGRESS NOTES
Preoperative Exam    Scheduled Procedure: ACDF C6-C7 & ICBG Ira-Anterior & Instrumentation Removal Level C6 - C7  Surgery Date:  8-  Surgery Location: [facility] - fax [phone]    Surgeon:  Dr Jameson Reynoso    Assessment/Plan:         1. Preop general physical exam  Medically clear for surgery   - HM2(CBC w/o Differential)    2. Cervical nerve root compression    Intractable pain despite prior laminectomy .   - HM2(CBC w/o Differential)      -       Surgical Procedure Risk: Low (reported cardiac risk generally < 1%)  Have you had prior anesthesia?: Yes  Have you or any family members had a previous anesthesia reaction:  No  Do you or any family members have a history of a clotting or bleeding disorder?: No  Cardiac Risk Assessment: no increased risk for major cardiac complications    APPROVAL GIVEN to proceed with proposed procedure, without further diagnostic evaluation        Functional Status: Independent  Patient plans to recover at home with family.     Subjective:      Ellie Echols is a 43 y.o. female who presents for a preoperative consultation.  Ongoing neck and arm pain despite prior surgery     All other systems reviewed and are negative, other than those listed in the HPI.    Pertinent History  Do you have difficulty breathing or chest pain after walking up a flight of stairs: No  History of obstructive sleep apnea: No  Steroid use in the last 6 months: Yes: Steroid Injection ~2 months ago  Frequent Aspirin/NSAID use: No  Prior Blood Transfusion: No  Prior Blood Transfusion Reaction: No  If for some reason prior to, during or after the procedure, if it is medically indicated, would you be willing to have a blood transfusion?:  There is no transfusion refusal.    Current Outpatient Medications   Medication Sig Dispense Refill     acetaminophen (TYLENOL) 325 MG tablet Take 2 tablets (650 mg total) by mouth every 6 (six) hours as needed for pain. (Patient taking differently: Take 500  mg by mouth every 6 (six) hours as needed for pain .      )  0     albuterol (VENTOLIN HFA) 90 mcg/actuation inhaler Inhale 2 puffs every 6 (six) hours as needed for wheezing. 18 g 5     amLODIPine (NORVASC) 5 MG tablet Take 1 tablet (5 mg total) by mouth daily. 30 tablet 3     cetirizine (ZYRTEC) 10 MG tablet Take 10 mg by mouth daily.       cholecalciferol, vitamin D3, (VITAMIN D3) 2,000 unit Tab Take by mouth.       citalopram (CELEXA) 20 MG tablet TAKE 1 TABLET BY MOUTH EVERY DAY 90 tablet 3     EPINEPHrine (AUVI-Q) 0.3 mg/0.3 mL injection Inject into thigh for allergic reaction 2 Pre-filled Pen Syringe 0     hydroCHLOROthiazide (HYDRODIURIL) 25 MG tablet Take 25 mg by mouth daily.  3     losartan (COZAAR) 100 MG tablet Take 1 tablet (100 mg total) by mouth daily. 90 tablet 3     MULTIVIT-MINERALS/FERROUS FUM (MULTI VITAMIN ORAL) Take 1 tablet by mouth daily.       Current Facility-Administered Medications   Medication Dose Route Frequency Provider Last Rate Last Dose     cyanocobalamin injection 1,000 mcg  1,000 mcg Intramuscular Q30 Days Maru Starks MD   1,000 mcg at 04/17/19 1519        Allergies   Allergen Reactions     [place] Hives     Banana Hives and Nausea Only     Minocycline Hives     Peanut Hives     Tree Nuts Hives     Including peanuts     [place] Hives     Avocado Hives     Dairy      Egg      Red Wine Extract      Wheat      Compazine [Prochlorperazine] Anxiety       Patient Active Problem List   Diagnosis     Lower Back Pain     Nephrolithiasis     Hypertension     Migraine Headache     Internal Hemorrhoids     Chronic Major Depression     Esophageal reflux     Urinary calculus, unspecified     Foraminal stenosis of cervical region     Cervical cord compression with myelopathy (H)     Cervical nerve root compression     Health maintenance examination       Past Medical History:   Diagnosis Date     Asthma      Bladder infection      Cervical cord compression with myelopathy (H)       Chronic back pain      Depression      Foraminal stenosis of cervical region      GERD (gastroesophageal reflux disease)      Hypertension      Internal hemorrhoids      Kidney stone      Migraine      Nephrolithiasis      Painful swelling of joint        Past Surgical History:   Procedure Laterality Date     CARPAL TUNNEL RELEASE Right       SECTION      five     LAMINOPLASTY Left 2018    Procedure: CERVICAL 4 - CERVICAL 7 OPEN LEFT LAMINOPLASTY ;  Surgeon: Yu Schofield MD;  Location: [facility];  Service:      IL CYSTOURETHROSCOPY      Description: Cystoscopy (For Therapy);  Proc Date: 06/15/2012;  Comments: Performed at [facility]     TUBAL LIGATION       URETEROSCOPY         Social History     Socioeconomic History     Marital status:      Spouse name: Not on file     Number of children: Not on file     Years of education: Not on file     Highest education level: Not on file   Occupational History     Not on file   Social Needs     Financial resource strain: Not on file     Food insecurity:     Worry: Not on file     Inability: Not on file     Transportation needs:     Medical: Not on file     Non-medical: Not on file   Tobacco Use     Smoking status: Former Smoker     Packs/day: 0.25     Years: 1.00     Pack years: 0.25     Last attempt to quit:      Years since quittin.5     Smokeless tobacco: Never Used   Substance and Sexual Activity     Alcohol use: Yes     Comment: rare     Drug use: No     Sexual activity: Yes     Partners: Male   Lifestyle     Physical activity:     Days per week: Not on file     Minutes per session: Not on file     Stress: Not on file   Relationships     Social connections:     Talks on phone: Not on file     Gets together: Not on file     Attends [employer] service: Not on file     Active member of club or organization: Not on file     Attends meetings of clubs or organizations: Not on file     Relationship status: Not on file     Intimate partner  "violence:     Fear of current or ex partner: Not on file     Emotionally abused: Not on file     Physically abused: Not on file     Forced sexual activity: Not on file   Other Topics Concern     Not on file   Social History Narrative    She is a  and works at [employer] part-time 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    2019 works full time at [employer] , every other weekend ,  is a stay at home     [place] from 7-15.   does all the kids stuff.              Objective:     Vitals:    07/29/19 1517   BP: 110/76   Pulse: 65   SpO2: 99%   Weight: 134 lb (60.8 kg)   Height: 5' 2.5\" (1.588 m)         Physical Exam:  Physical Examination: General appearance - alert, well appearing, and in no distress  Mental status - alert, oriented to person, place, and time  Neck - supple, no significant adenopathy  Lymphatics - no palpable lymphadenopathy, no hepatosplenomegaly  Chest - clear to auscultation, no wheezes, rales or rhonchi, symmetric air entry  Heart - normal rate, regular rhythm, normal S1, S2, no murmurs, rubs, clicks or gallops  Abdomen - soft, nontender, nondistended, no masses or organomegaly  Extremities - peripheral pulses normal, no pedal edema, no clubbing or cyanosis      There are no Patient Instructions on file for this visit.    EKg , chest xray not needed     Labs:      Immunization History   Administered Date(s) Administered     Influenza, Seasonal, Inj PF IIV3 09/25/2012, 01/07/2016     Influenza, inj, historic,unspecified 11/05/2008, 11/02/2011     Td, adult adsorbed, PF 04/05/2019     Td,adult,historic,unspecified 07/31/2006     Tdap 07/31/2006           Electronically signed by Maru Starks MD 07/29/19 3:18 PM  "

## 2021-05-30 NOTE — TELEPHONE ENCOUNTER
----- Message from Oscar Gaviria sent at 6/27/2019  3:53 PM CDT -----  Contact: Pt  General phone call:    Caller: Pt    Primary cardiologist: Dr Rooney    Detailed reason for call: Pt may need a surgery on her neck - Pt's BP was 159/107 - she feels its been elevated for the last couple weeks.  Pt not feeling the best either - wanted a call to discuss    New or active symptoms? New ~ 2 weeks    Best phone number: [phone]  Best time to contact: any  Ok to leave a detailed message? yes  Device? no    Additional Info:

## 2021-05-31 ENCOUNTER — RECORDS - HEALTHEAST (OUTPATIENT)
Dept: ADMINISTRATIVE | Facility: CLINIC | Age: 46
End: 2021-05-31

## 2021-05-31 VITALS — WEIGHT: 160 LBS | BODY MASS INDEX: 28.35 KG/M2 | HEIGHT: 63 IN

## 2021-05-31 VITALS — BODY MASS INDEX: 28.8 KG/M2 | WEIGHT: 160 LBS

## 2021-05-31 VITALS — WEIGHT: 156 LBS | BODY MASS INDEX: 28.08 KG/M2

## 2021-06-01 VITALS — BODY MASS INDEX: 23.92 KG/M2 | HEIGHT: 63 IN | WEIGHT: 135 LBS

## 2021-06-01 VITALS — WEIGHT: 145.56 LBS | BODY MASS INDEX: 25.79 KG/M2

## 2021-06-01 VITALS — HEIGHT: 63 IN | WEIGHT: 141.7 LBS | BODY MASS INDEX: 25.11 KG/M2

## 2021-06-01 VITALS — BODY MASS INDEX: 27.39 KG/M2 | WEIGHT: 154.56 LBS | HEIGHT: 63 IN

## 2021-06-01 VITALS — HEIGHT: 63 IN | WEIGHT: 154 LBS | BODY MASS INDEX: 27.29 KG/M2

## 2021-06-01 VITALS — BODY MASS INDEX: 24.33 KG/M2 | WEIGHT: 137.3 LBS | HEIGHT: 63 IN

## 2021-06-01 VITALS — WEIGHT: 143.8 LBS | BODY MASS INDEX: 25.47 KG/M2

## 2021-06-01 VITALS — BODY MASS INDEX: 28.09 KG/M2 | WEIGHT: 156.06 LBS

## 2021-06-01 VITALS — BODY MASS INDEX: 24.45 KG/M2 | HEIGHT: 63 IN | WEIGHT: 138 LBS

## 2021-06-01 VITALS — BODY MASS INDEX: 27.72 KG/M2 | WEIGHT: 154 LBS

## 2021-06-01 VITALS — HEIGHT: 63 IN | WEIGHT: 145 LBS | BODY MASS INDEX: 25.69 KG/M2

## 2021-06-01 NOTE — PROGRESS NOTES
Office Visit   Ellie Echols   43 y.o. female    Date of Visit: 9/12/2019    Chief Complaint   Patient presents with     Fainted     Yesterday and hit head- had cervical fusion 3 weeks ago        Assessment and Plan   1. Syncope, unspecified syncope type  This is of unclear etiology but does seem to be due to hypotension or vasovagal.  She did have some lightheadedness prior to syncope.  Over the last few days she has had similar episodes of feeling lightheaded when she stands up but has not had syncope.  She has not had any chest pain or palpitations.  I am going to check her blood work today including her CBC, metabolic panel, B12, and EKG.  Her blood pressure is low today and I am going to have her stop the amlodipine.  I also recommend that she start monitoring her blood pressure outside the clinic and writing it down.  She will come in in a couple weeks to follow-up with her primary and determine next steps.  If she has any worsening symptoms in the meantime then may need to have her see cardiology.  She did have a negative Holter monitor last spring.  She is in agreement with this plan.  - HM2(CBC w/o Differential)  - Basic Metabolic Panel  - Electrocardiogram Perform - Clinic  - Vitamin B12    2. Neck pain  He had neck surgery 3 weeks ago and I will get an x-ray to make sure that everything looks okay.    3. Hypertension  As above she has a history of hypertension but today her blood pressure is pretty low.  Recommend she start monitoring it outside the clinic and I am going to have her stop amlodipine at this time.         Return in about 2 weeks (around 9/26/2019) for Recheck - Kamal - BP recheck.     History of Present Illness   This 43 y.o. old female comes in due to a syncopal episode.  She had neck surgery about 3 weeks ago.  She had been doing well until the last few days when she started to feel lightheaded when she stood up.  She has a history of hypertension and has been on losartan,  hydrochlorothiazide, and amlodipine.  Prior to surgery the amlodipine was added.  Yesterday she stood up and had a syncopal episode and hit the back of her head.  Today she started having a little bit more neck pain.  She is not having a headache but notes a slight amount of double vision.  She has not had any nausea or vomiting and no chest pain or palpitations.  She was having episodes of dizziness last spring and was evaluated by cardiology.  She is not having any numbness or tingling or pain into her arms and the neck pain is just slightly worse than it was prior to the fall.  She has no other acute concerns.    Review of Systems: As above, systems otherwise reviewed and negative.     Medications, Allergies and Problem List   Patient Active Problem List   Diagnosis     Lower Back Pain     Nephrolithiasis     Hypertension     Migraine Headache     Internal Hemorrhoids     Chronic Major Depression     Esophageal reflux     Urinary calculus, unspecified     Foraminal stenosis of cervical region     Cervical cord compression with myelopathy (H)     Cervical nerve root compression     Health maintenance examination     Current Outpatient Medications   Medication Sig Dispense Refill     albuterol (VENTOLIN HFA) 90 mcg/actuation inhaler Inhale 2 puffs every 6 (six) hours as needed for wheezing. 18 g 5     cetirizine (ZYRTEC) 10 MG tablet Take 10 mg by mouth daily.       cholecalciferol, vitamin D3, (VITAMIN D3) 2,000 unit Tab Take by mouth.       citalopram (CELEXA) 20 MG tablet TAKE 1 TABLET BY MOUTH EVERY DAY 90 tablet 3     EPINEPHrine (AUVI-Q) 0.3 mg/0.3 mL injection Inject into thigh for allergic reaction 2 Pre-filled Pen Syringe 0     hydroCHLOROthiazide (HYDRODIURIL) 25 MG tablet Take 25 mg by mouth daily.  3     losartan (COZAAR) 100 MG tablet Take 1 tablet (100 mg total) by mouth daily. 90 tablet 3     MULTIVIT-MINERALS/FERROUS FUM (MULTI VITAMIN ORAL) Take 1 tablet by mouth daily.       acetaminophen  "(TYLENOL) 325 MG tablet Take 2 tablets (650 mg total) by mouth every 6 (six) hours as needed for pain. (Patient taking differently: Take 500 mg by mouth every 6 (six) hours as needed for pain .      )  0     Current Facility-Administered Medications   Medication Dose Route Frequency Provider Last Rate Last Dose     cyanocobalamin injection 1,000 mcg  1,000 mcg Intramuscular Q30 Days Maru Starks MD   1,000 mcg at 19 2049     Allergies   Allergen Reactions     [place] Hives     Banana Hives and Nausea Only     Minocycline Hives     Peanut Hives     Tree Nuts Hives     Including peanuts     [place] Hives     Avocado Hives     Dairy      Egg      Red Wine Extract      Wheat      Prochlorperazine Anxiety          Physical Exam     /80 (Patient Site: Right Arm, Patient Position: Sitting)   Pulse 84   Ht 5' 2.5\" (1.588 m)   Wt 134 lb (60.8 kg)   SpO2 100%   BMI 24.12 kg/m      General:  Patient is alert and in no apparent distress.  Neck:  Supple with no adenopathy or thyroid abnormality noted.  Incision is healing well.  Cardiovascular:  Regular rate and rhythm, normal S1/S2, no murmurs, rubs, or gallop.  Pulmonary:  Lungs are clear to auscultation bilaterally with normal respiratory effort.  Neurologic Cranial nerves are intact.  No focal deficits.  Pupils are equal round reactive to light and extraocular movements are intact.  Psychiatric:  Pleasant, no confusion or agitation          Additional Information   Social History     Tobacco Use     Smoking status: Former Smoker     Packs/day: 0.25     Years: 1.00     Pack years: 0.25     Last attempt to quit:      Years since quittin.6     Smokeless tobacco: Never Used   Substance Use Topics     Alcohol use: Yes     Comment: rare     Drug use: No          Breana Olsen MD    "

## 2021-06-01 NOTE — PROGRESS NOTES
"  Office Visit - Follow Up   Ellie Echols   43 y.o. female    Date of Visit: 9/26/2019         Assessment and Plan   1. Postoperative pain  Still in some pain . So will refill her hydrocodone   - HYDROcodone-acetaminophen 5-325 mg per tablet; Take 1 tablet by mouth every 4 (four) hours as needed for pain.  Dispense: 18 tablet; Refill: 0    2. Vasovagal syncope  One full episode and and presyncopal .Even though she has classic vasovagal symptoms  She had some t wave changes in her EKG and With her long standing hypertension , I would like to check stress echo for any structural changes or ischemia .   I also feel her capacity to compensate for postural changes is worsened by multiple BP meds . Her hasnt gone up and is still low normal without amlodipine . Will stop the hydrochlorothiazide and potassium and cut losartan in half and follow bp   - Echo Stress Exercise; Future            No follow-ups on file.     History of Present Illness   This 43 y.o. old   Chief Complaint   Patient presents with     Follow-up     Blacked out 2 days ago, didnt fall, happens when changes positions     No chest pain , palpitations , shortness of breath . Only positional presyncope     Review of Systems: A comprehensive review of systems was negative except as noted.     Medications, Allergies and Problem List   Reviewed, reconciled and updated  Patient Active Problem List   Diagnosis     Lower Back Pain     Nephrolithiasis     Hypertension     Migraine Headache     Internal Hemorrhoids     Chronic Major Depression     Esophageal reflux     Urinary calculus, unspecified     Foraminal stenosis of cervical region     Cervical cord compression with myelopathy (H)     Cervical nerve root compression     Health maintenance examination        Physical Exam   General Appearance:      /78 (Patient Site: Left Arm, Patient Position: Sitting, Cuff Size: Adult Large)   Pulse 84   Ht 5' 2.5\" (1.588 m)   Wt 134 lb (60.8 kg)   SpO2 " 99%   BMI 24.12 kg/m      General appearance - alert, well appearing, and in no distress  Mental status - alert, oriented to person, place, and time  Neck - supple, no significant adenopathy  Lymphatics - no palpable lymphadenopathy, no hepatosplenomegaly  Chest - clear to auscultation, no wheezes, rales or rhonchi, symmetric air entry  Heart - normal rate, regular rhythm, normal S1, S2, no murmurs, rubs, clicks or gallops  Abdomen - soft, nontender, nondistended, no masses or organomegaly  Breasts - breasts appear normal, no suspicious masses, no skin or nipple changes or axillary nodes  Pelvic - normal external genitalia, vulva, vagina, cervix, uterus and adnexa, PAP: Pap smear done today  Musculoskeletal - no joint tenderness, deformity or swelling  Extremities - peripheral pulses normal, no pedal edema, no clubbing or cyanosis  Skin - normal coloration and turgor, no rashes, no suspicious skin lesions noted                                                                                       Additional Information   Current Outpatient Medications   Medication Sig Dispense Refill     acetaminophen (TYLENOL) 325 MG tablet Take 2 tablets (650 mg total) by mouth every 6 (six) hours as needed for pain. (Patient taking differently: Take 500 mg by mouth every 6 (six) hours as needed for pain .      )  0     albuterol (VENTOLIN HFA) 90 mcg/actuation inhaler Inhale 2 puffs every 6 (six) hours as needed for wheezing. 18 g 5     cetirizine (ZYRTEC) 10 MG tablet Take 10 mg by mouth daily.       cholecalciferol, vitamin D3, (VITAMIN D3) 2,000 unit Tab Take by mouth.       citalopram (CELEXA) 20 MG tablet TAKE 1 TABLET BY MOUTH EVERY DAY 90 tablet 3     EPINEPHrine (AUVI-Q) 0.3 mg/0.3 mL injection Inject into thigh for allergic reaction 2 Pre-filled Pen Syringe 0     losartan (COZAAR) 100 MG tablet Take 1 tablet (100 mg total) by mouth daily. 90 tablet 3     MULTIVIT-MINERALS/FERROUS FUM (MULTI VITAMIN ORAL) Take 1 tablet by  mouth daily.       HYDROcodone-acetaminophen 5-325 mg per tablet Take 1 tablet by mouth every 4 (four) hours as needed for pain. 18 tablet 0     Current Facility-Administered Medications   Medication Dose Route Frequency Provider Last Rate Last Dose     cyanocobalamin injection 1,000 mcg  1,000 mcg Intramuscular Q30 Days Maru Starks MD   1,000 mcg at 04/17/19 1519     Allergies   Allergen Reactions     [place] Hives     Banana Hives and Nausea Only     Minocycline Hives     Peanut Hives     Tree Nuts Hives     Including peanuts     Cranberry Hives     Avocado Hives     Dairy      Egg      Red Wine Extract      Wheat      Prochlorperazine Anxiety     Social History     Patient does not qualify to have social determinant information on file (likely too young).   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    2019 works full time at [employer] , every other weekend ,  is a stay at home     [place] from 7-15.   does all the kids stuff.       reports that she quit smoking about 6 years ago. She has a 0.25 pack-year smoking history. She has never used smokeless tobacco. She reports current alcohol use. She reports that she does not use drugs.   Past Medical History:   Diagnosis Date     Asthma      Bladder infection      Cervical cord compression with myelopathy (H)      Chronic back pain      Depression      Foraminal stenosis of cervical region      GERD (gastroesophageal reflux disease)      Hypertension      Internal hemorrhoids      Kidney stone      Migraine      Nephrolithiasis      Painful swelling of joint            Time: total time spent with the patient was 15 minutes of which >50% was spent in counseling and coordination of care     Maru Starks MD

## 2021-06-01 NOTE — PATIENT INSTRUCTIONS - HE
Stop hydrochlorothiazide and potassium  Monitor BP target is 11/50 to 130/80 above or below that send me a my chart message   One month bp check with labs   Losartan can be cut in half to be taken 50mg once daily

## 2021-06-02 VITALS — WEIGHT: 132 LBS | BODY MASS INDEX: 23.39 KG/M2 | HEIGHT: 63 IN

## 2021-06-02 VITALS — WEIGHT: 134.5 LBS | BODY MASS INDEX: 23.83 KG/M2 | HEIGHT: 63 IN

## 2021-06-02 VITALS — WEIGHT: 136 LBS | BODY MASS INDEX: 24.1 KG/M2 | HEIGHT: 63 IN

## 2021-06-02 VITALS — WEIGHT: 136.5 LBS | BODY MASS INDEX: 24.19 KG/M2 | HEIGHT: 63 IN

## 2021-06-02 VITALS — BODY MASS INDEX: 23.74 KG/M2 | WEIGHT: 134 LBS | HEIGHT: 63 IN

## 2021-06-03 ENCOUNTER — RECORDS - HEALTHEAST (OUTPATIENT)
Dept: ADMINISTRATIVE | Facility: CLINIC | Age: 46
End: 2021-06-03

## 2021-06-03 VITALS
SYSTOLIC BLOOD PRESSURE: 102 MMHG | BODY MASS INDEX: 23.74 KG/M2 | DIASTOLIC BLOOD PRESSURE: 80 MMHG | OXYGEN SATURATION: 100 % | HEART RATE: 84 BPM | WEIGHT: 134 LBS | HEIGHT: 63 IN

## 2021-06-03 VITALS
DIASTOLIC BLOOD PRESSURE: 78 MMHG | HEART RATE: 84 BPM | WEIGHT: 134 LBS | SYSTOLIC BLOOD PRESSURE: 120 MMHG | OXYGEN SATURATION: 99 % | BODY MASS INDEX: 23.74 KG/M2 | HEIGHT: 63 IN

## 2021-06-03 VITALS — BODY MASS INDEX: 23.21 KG/M2 | HEIGHT: 63 IN | WEIGHT: 131 LBS

## 2021-06-03 VITALS — HEIGHT: 63 IN | WEIGHT: 134 LBS | BODY MASS INDEX: 23.74 KG/M2

## 2021-06-04 VITALS
OXYGEN SATURATION: 98 % | HEART RATE: 77 BPM | BODY MASS INDEX: 22.08 KG/M2 | RESPIRATION RATE: 16 BRPM | WEIGHT: 120 LBS | HEIGHT: 62 IN

## 2021-06-04 VITALS
BODY MASS INDEX: 21.98 KG/M2 | DIASTOLIC BLOOD PRESSURE: 68 MMHG | SYSTOLIC BLOOD PRESSURE: 124 MMHG | WEIGHT: 120.2 LBS | HEART RATE: 72 BPM

## 2021-06-05 NOTE — TELEPHONE ENCOUNTER
Who is calling:  Patient   Reason for Call:  Patient is coming in for same days labs. Outside orders in hand from [facility]     Okay to leave a detailed message: Yes

## 2021-06-08 NOTE — PATIENT INSTRUCTIONS - HE
Albuterol 2 puffs 15-30 min prior to exercise    Speech therapy referral    Vocal Cord Dysfunction          Oral Allergy Syndrome      Avoid foods that cause difficulty in raw form    Could consider allergy shots if symptoms progress

## 2021-06-08 NOTE — TELEPHONE ENCOUNTER
----- Message -----  From: Elana Rooney MD  Sent: 6/15/2020   1:34 PM CDT  To: Nikki Almanza RN    July is fine.  EG  ----- Message -----  From: Nikki Almanza RN  Sent: 6/15/2020   1:31 PM CDT  To: Elana Rooney MD    ----- Message from Nikki Almanza RN sent at 6/15/2020  1:31 PM CDT -----  Dr. Rooney,    Please review message from Bessie Cabrera. I have called patient and left message to ask her preference. Is it okay for patient to wait until July to see you in clinic or would you like her to see a  [place] licensed colleague? -charleneb

## 2021-06-08 NOTE — PROGRESS NOTES
Chief complaint: Asthma concerns, food allergy    History of present illness: This is a pleasant 44-year-old woman patient of Dr. Thompson establishing care.  She states she feels very short of breath when she is running.  She has a history of intermittent asthma.  She does use her albuterol inhaler but right away before she is running.  Occasionally receives some small relief from this.  She does believe the albuterol inhaler helps when she uses it after the fact, however.  She does have a history of vocal cord paralysis so states that she wonders if this is secondary to more vocal cord pathology rather than her asthma.  She does not have symptoms outside of exercise.  Denies any cough, wheeze or shortness of breath.  She does feel that if difficulty breathing and feels some tightness in her throat when she has the symptoms.    She also has concerns of food allergy.  She states when she eats avocados, carrots, bananas, apples she will have immediate vomiting.  She has tested negative for these in the past she states.  She does have a known tree nut allergy.  No systemic hives, swelling or shortness of breath.  She does have a history of environmental allergies.    Past medical history, social history, family medical history, meds and allergies reviewed and updated accordingly.      Review of Systems performed as above and the remainder is negative.         Current Outpatient Medications:      albuterol (VENTOLIN HFA) 90 mcg/actuation inhaler, Inhale 2 puffs every 6 (six) hours as needed for wheezing., Disp: 18 g, Rfl: 5     cetirizine (ZYRTEC) 10 MG tablet, Take 10 mg by mouth daily., Disp: , Rfl:      cholecalciferol, vitamin D3, (VITAMIN D3) 2,000 unit Tab, Take by mouth., Disp: , Rfl:      EPINEPHrine (AUVI-Q) 0.3 mg/0.3 mL injection, Inject into thigh for allergic reaction, Disp: 2 Pre-filled Pen Syringe, Rfl: 0     FLUoxetine (PROZAC) 40 MG capsule, Take 80 mg by mouth daily., Disp: , Rfl:      losartan (COZAAR)  "100 MG tablet, TAKE 1 TABLET BY MOUTH EVERY DAY, Disp: 90 tablet, Rfl: 1     MULTIVIT-MINERALS/FERROUS FUM (MULTI VITAMIN ORAL), Take 1 tablet by mouth daily., Disp: , Rfl:      triamterene-hydrochlorothiazide (MAXZIDE-25) 37.5-25 mg per tablet, Take 1 tablet by mouth daily., Disp: 30 tablet, Rfl: 11    Current Facility-Administered Medications:      cyanocobalamin injection 1,000 mcg, 1,000 mcg, Intramuscular, Q30 Days, Maru Starks MD, 1,000 mcg at 04/17/19 1519    Allergies   Allergen Reactions     [place] Hives     Banana Hives and Nausea Only     Minocycline Hives     Peanut Hives     Tree Nuts Hives     Including peanuts     [place] Hives     Avocado Hives     Dairy      Egg      Red Wine Extract      Wheat      Prochlorperazine Anxiety       Pulse 77   Resp 16   Ht 5' 2\" (1.575 m)   Wt 120 lb (54.4 kg)   SpO2 98%   BMI 21.95 kg/m    Gen: Pleasant female not in acute distress  HEENT: Eyes no erythema of the bulbar or palpebral conjunctiva, no edema. Nose: No congestion, mucosa normal. Mouth: Throat clear, no lip or tongue edema.   Cardiac: Regular rate and rhythm, no murmurs, rubs or gallops  Respiratory: Clear to auscultation bilaterally, no adventitious breath sounds    Skin: No rashes or lesions  Psych: Alert and oriented times 3    Impression report and plan:    1.  Vocal Cord Dysfunction  2.  Exercise induced bronchospasm  3.  Oral Allergy Syndrome      I think some of her symptoms are secondary to vocal cord dysfunction.  I reviewed some vocal cord dysfunction exercises with her.  Would like to refer her to speech therapy for evaluation of this.  Could consider ENT as well.  I think she does have oral allergy syndrome and pollen food syndrome as well.  Reviewed the pathology with her. If this worsens, she could consider allergy shots.  She should avoid foods that cause her difficulty in the raw form.  Premedicate exercise with albuterol, 2 puffs 15 to 30 minutes prior to the " exercise.      Time spent with patient, 45 minutes, greater than half spent counseling and coordination of care regarding vocal cord dysfunction, asthma and oral allergy syndrome

## 2021-06-08 NOTE — TELEPHONE ENCOUNTER
Advised patient that is necessary to reschedule appointment with Dr. Rooney-- okay to wait until July. Patient verbalized understanding and agreement.     Message sent to scheduling team to get appointment rescheduled. -priscilla

## 2021-06-08 NOTE — TELEPHONE ENCOUNTER
----- Message from Bessie Cabrera sent at 6/15/2020 12:47 PM CDT -----  Regarding: Virtual Visit 6/16  Peggy and Dr. Rooney,     This patient is currently scheduled for a virtual visit with Dr. Rooney on 6/16. Unfortunately we are now unable to do virtual visits with our [place] patients unless they are seen by a [place] licensed provider. There are a few options for this patient:     - Reschedule the patient to see one of our [place] licensed providers for a virtual visit (Yevgeniy Harris Timmons, J. Halbe)  - Reschedule to a day when Dr. Rooney is in clinic next (mid July).     Please let me know so we can contact this patient as soon as possible.    Thank you!  Bessie

## 2021-06-08 NOTE — PROGRESS NOTES
"   [facility] Follow Up Note    Ellie Echols   41 y.o. female    Date of Visit: 1/30/2017    Chief Complaint   Patient presents with     Abdominal Pain     Subjective  Ellie comes in today she's been having abdominal pain that has been very severe and she's been having diarrhea which has been a long-term problem for her.  She also had some flank pain.  She denies any changes with her urine.  She denies any blood in her stool.  Many years ago she did have some blood in her stool and had a negative colonoscopy.  She's going to be leaving tomorrow for [place] for 3 weeks.    ROS A comprehensive review of systems was performed and was otherwise negative    Social History:   Social History     Social History Narrative    She is a  and works at [employer].  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       Medications were reconciled.  Allergies, social and family history, and the problem list were all reviewed and updated.    Exam  General Appearance: Pleasant and alert  Vitals:    01/30/17 0926   BP: 122/82   Pulse: 85   Resp: 14   Temp: 97.9  F (36.6  C)   TempSrc: Oral   Weight: 162 lb (73.5 kg)   Height: 5' 2\" (1.575 m)      Body mass index is 29.63 kg/(m^2).  Wt Readings from Last 3 Encounters:   01/30/17 162 lb (73.5 kg)   01/18/16 152 lb (68.9 kg)   09/26/14 147 lb (66.7 kg)     HEENT: Sclera are clear.   Lungs: Normal respirations  Cardiac: Regular rate and rhythm  Abdomen: Soft and nondistended, and no tenderness  Extremities: No edema  Skin: No rashes  Neuro: Moves all extremities and has facial symmetry  Gait: Ambulates with a normal gait    Assessment/Plan  1. Abdominal pain  Check labs and a CT scan today.  Could he due to irritable bowel syndrome with a spastic colon.  She was also given some dicyclomine to try as needed.  - Comprehensive Metabolic Panel  - HM2(CBC w/o Differential)  - Urinalysis-UC if Indicated  - CT Abdomen Pelvis Without Oral Without IV " Contrast; Future    2. Diarrhea  We'll check labs today.  She is already on a probiotic daily.  - C. difficile Toxigenic by PCR; Future  - Culture, Stool; Future  - Ova and Parasite, Stool; Future  - Ova and Parasite, Stool; Future  - Ova and Parasite, Stool; Future  - Fecal WBC's; Future      April D MD Aneta  1/30/2017    Much or all of the text in this note was generated through the use of Dragon Dictate voice-to-text software. Errors in spelling or words which seem out of context are unintentional. Sound alike errors, in particular, may have escaped editing.

## 2021-06-09 NOTE — PROGRESS NOTES
I met with Ellie Echols at the request of Dr. Starks to recheck her blood pressure.  Blood pressure medications on the MAR were reviewed with patient.    Patient has taken all medications as per usual regimen: Yes  Patient reports tolerating them without any issues or concerns: No    Vitals:    07/03/20 1014   BP: 124/68   Patient Site: Right Arm   Patient Position: Sitting   Cuff Size: Adult Regular   Pulse: 72   Weight: 120 lb 3.2 oz (54.5 kg)       Blood pressure was taken, previous encounter was reviewed, recorded blood pressure below 140/90.  Patient was discharged and the note will be sent to the provider for final review.

## 2021-06-09 NOTE — TELEPHONE ENCOUNTER
Sophia from the [facility] says she is going downhill  Mentally . So she is only getting outpatient therapy . Insurance only covers that . Her therapist at the [place] [facility] cannot admit her for  Is having suicidal ideation.   also called Ellie rivera voicemail . Will message her that I have sent in urgent psychiatry consult for suicidal ideation and also to go to ER as the preferable choice given her rapidly  deteriorating condition.

## 2021-06-09 NOTE — TELEPHONE ENCOUNTER
"Patient has a future lab appointment on 07/20/2020 for \"Lab per Kamal\". There are no lab orders. Could you please review the patient's chart and place orders?    If no lab orders are needed at this time, please forward this message to [facility]. They will then call the patient and inform them that no labs are needed at this time per provider.     Thanks    "

## 2021-06-09 NOTE — TELEPHONE ENCOUNTER
Dr. Hammer    This person called and is requesting a call back:    Name Of Person Who Called: Ellie    Why Did The Person Call: Patient called in regards to initial consult on 2/13/18. She had a questions about the records.     Best Phone Number To Call Back: [phone]    Thank you.

## 2021-06-09 NOTE — TELEPHONE ENCOUNTER
Sophia Matamoros from the [facility] called via the [employer] and states that she has information that she only would like to share with you regarding the patient.  She is working from home and will be available until 4:30 pm today.  Sophia states that if you are unable to speak with her today, anytime the rest of the week would be fine too.  Her cell phone number is [phone].  Thank you.  Saumya DEL ANGEL CMA/FELISHA....................12:37 PM

## 2021-06-10 NOTE — PROGRESS NOTES
Assessment/Plan:        Diagnoses and all orders for this visit:    Calculus of ureter  -     Symptom Control While Passing a Stone Education  -     CT Abdomen Pelvis Without Oral Without IV Contrast; Future; Expected date: 5/8/17  -     tamsulosin (FLOMAX) 0.4 mg Cp24; Take 1 capsule (0.4 mg total) by mouth daily for 14 days.  Dispense: 14 capsule; Refill: 1    Lower abdominal pain    Hydronephrosis with urinary obstruction due to ureteral calculus    Calculus of kidney    Urinary calculus, unspecified  -     POCT urinalysis dipstick-[facility]      Stone Management Plan  [facility] Stone Management 9/26/2014 9/26/2014 4/24/2017   Urinary Tract Infection No suspicion of infection No suspicion of infection No suspicion of infection   Renal Colic Inadequate outpatient management of symptoms Asymptomatic at this time Well controlled symptoms   Renal Failure No suspicion of renal failure No suspicion of renal failure No suspicion of renal failure   Current CT date - - 4/20/2017   Right sided stones? - - Yes   R Number of ureteral stones - 0 No ureteral stones   R Number of kidney stones  - - 3   R GSD of kidney stones - - < 2   R Hydronephrosis - - None   R Stone Event - - No current event   R Current Plan - - Observe   Observe rationale - Limited stone burden with good prognosis for spontaneous passage Limited stone burden with good prognosis for spontaneous passage   Left sided stones? - - Yes   L Number of ureteral stones - 0 1   L GSD of ureteral stones - - 5   L Location of ureteral stone - - Mid   L Number of kidney stones  - - 4   L GSD of kidney stones - - < 2   L Hydronephrosis - - Mild   L Stone Event - - New event   Diagnosis date - - 4/20/2017   Initial location of primary symptomatic stone - - Mid   Initial GSD of primary symptomatic stone - - 5   L MET Status - - Initiation   L Current Plan - - MET   MET - - 2 week F/U   Observe rationale - Limited stone burden with good prognosis for spontaneous passage -              Subjective:      HPI  Ms. Ellie Echols is a 41 y.o.  female returning to the [facility] [place] for unanticipated visit with acute exacerbation of chronic stone disease.      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 She has previously participated in partial stone risk evaluation. She has identified modifiable stone risks including:  unclassified hypercalciuria. She has identified non-modifiable stone risks including:  multiple stones at presentation and bilateral stones.    Primary symptom occurring a couple weeks ago was gradual onset, intermittent, mild left back pain. The pain settled after a few days but then she developed diffuse abdominal pain, left > right which was cramping in quality. At its worst, pain reached 7/10. Significant associated symptoms at presentation included:  nausea and urinary urgency. She was seen at [facility]20 with CT confirming an obstructing left ureteral calculus. Significant current symptoms include:  mild lower abdominal pain. She has been managing the pain with ibuprofen and prn percocet. Pertinent negative current symptoms include:  fever, chills, right flank pain, left flank pain, nausea, vomiting, hematuria, urinary frequency and dysuria.    CT scan from 4/20/2017 is personally reviewed and demonstrates a mildly obstructing 5 mm left mid ureteral calculus, migrated from lower pole. No change in multiple, bilateral, tiny renal papillary tip calcifications.    Significant labs from presentation include severe hematuria, no pyuria, negative nitrite and no bacteria.    PLAN    40 yo F with recurrent calcium based stone disease. Presenting with obstructing left ureteral calculus and stable bilateral renal papillary tip calcifications. Mild abdominal pain well controlled.    Will proceed with medical expulsive therapy. Risks and benefits were detailed of  medical expulsive therapy including probability of stone passage, recurrent renal colic, and requirement of emergency medical and/or surgical care and further imaging. Patient verbalized understanding. Patient agrees with plan as discussed. She will return in 2 weeks with low dose CT scan.    For symptom control, she was prescribed Percocet, ondansetron and Flomax. Over the counter symptom control medications of ibuprofen and Dramamine were recommended.    Patient also seen and examined by Guadalupe Leon PA-C     ROS   Review of systems is negative except for HPI.    Past Medical History:   Diagnosis Date     Bladder infection      Chronic back pain      GERD (gastroesophageal reflux disease)      Hypertension      Kidney stone      Migraine      Past Surgical History:   Procedure Laterality Date     CARPAL TUNNEL RELEASE Right       SECTION      five     DC CYSTOURETHROSCOPY      Description: Cystoscopy (For Therapy);  Proc Date: 06/15/2012;  Comments: Performed at [facility]     TUBAL LIGATION       URETEROSCOPY       Current Outpatient Prescriptions   Medication Sig Dispense Refill     albuterol (PROVENTIL HFA;VENTOLIN HFA) 90 mcg/actuation inhaler Inhale 2 puffs every 6 (six) hours as needed for wheezing. 1 Inhaler 12     citalopram (CELEXA) 20 MG tablet TAKE 1 TABLET BY MOUTH EVERY DAY 90 tablet 2     losartan-hydrochlorothiazide (HYZAAR) 100-25 mg per tablet Take 1 tablet by mouth daily. 90 tablet 3     montelukast (SINGULAIR) 10 mg tablet Take 1 tablet (10 mg total) by mouth daily. 30 tablet 3     tamsulosin (FLOMAX) 0.4 mg Cp24 Take 1 capsule (0.4 mg total) by mouth daily for 14 days. 14 capsule 1     No current facility-administered medications for this visit.      Allergies   Allergen Reactions     Minocycline Hives     Compazine [Prochlorperazine] Anxiety     Social History     Social History     Marital status:      Spouse name: N/A     Number of children: N/A     Years of education: N/A      Occupational History     Not on file.     Social History Main Topics     Smoking status: Current Every Day Smoker     Packs/day: 0.25     Years: 1.00     Smokeless tobacco: Not on file     Alcohol use Yes      Comment: rare     Drug use: No     Sexual activity: Not on file     Other Topics Concern     Not on file     Social History Narrative    She is a  and works at [employer].  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     Family History   Problem Relation Age of Onset     Adopted: Yes     Family history unknown: Yes     Objective:      Physical Exam  Vitals:    04/24/17 1513   BP: 142/86   Pulse: 69   Temp: 98.3  F (36.8  C)     General - well developed, well nourished, appropriate for age. Appears no distress at this time  Abdomen - slender soft, non-tender, no hepatosplenomegaly, no masses.   - no flank tenderness, no suprapubic tenderness, kidney and bladder non-palpable  MSK - normal spinal curvature. no spinal tenderness. normal gait. muscular strength intact.  Psych - oriented to time, place, and person, normal mood and affect.    Labs   Urinalysis POC (Office):  Blood UA   Date Value Ref Range Status   04/24/2017 Large Negative Final   09/26/2014 Negative Negative Final     Nitrite, UA   Date Value Ref Range Status   04/24/2017 Negative Negative Final   01/30/2017 Negative Negative Final   09/26/2014 Negative Negative Final   05/30/2012 Negative (Negative) Final   04/29/2011 Negative (Negative) Final     Leukocytes, UA    Date Value Ref Range Status   04/24/2017 Negative Negative Final   09/26/2014 Negative Negative Final     pH UA   Date Value Ref Range Status   04/24/2017 5.5 4.5 - 8.0 Final   09/26/2014 7.0 4.5 - 8.0 Final       Lab Urinalysis:  Blood, UA   Date Value Ref Range Status   01/30/2017 Negative Negative Final   05/30/2012 Moderate (!) (Negative) Final   04/29/2011 Negative (Negative) Final     Nitrite, UA   Date Value Ref Range Status   04/24/2017 Negative  Negative Final   01/30/2017 Negative Negative Final   09/26/2014 Negative Negative Final   05/30/2012 Negative (Negative) Final   04/29/2011 Negative (Negative) Final     Leukocytes, UA   Date Value Ref Range Status   01/30/2017 Negative Negative Final   05/30/2012 Trace (!) (Negative) Final   04/29/2011 Negative (Negative) Final     pH, UA   Date Value Ref Range Status   01/30/2017 7.0 4.5 - 8.0 Final   08/13/2012 5.5 4.5 - 8.0 Final   05/30/2012 6.5 (5.0-8.0) Final

## 2021-06-10 NOTE — TELEPHONE ENCOUNTER
Dr. Olsen,  Would you be willing to review and advise on the question below regarding a direct admit for the patient?  Please advise.  Thank you.  Saumya DEL ANGEL, CLIFTON/FELISHA....................12:40 PM

## 2021-06-10 NOTE — TELEPHONE ENCOUNTER
No we would not be able to directly admit her to [facility].  She will either need to reach out to the service that took care of her (should have a number on a discharge summary) or she needs to go to the ED.

## 2021-06-10 NOTE — PROGRESS NOTES
Assessment/Plan:        Diagnoses and all orders for this visit:    Calculus of ureter    Calculus of kidney  -     Stone Formation, 24 Hour Urine x2; Standing  -     Renal Function Profile; Future; Expected date: 6/8/17  -     Uric Acid; Future; Expected date: 6/8/17  -     Magnesium; Future; Expected date: 6/8/17  -     Calcium, Ionized, Measured; Future; Expected date: 6/8/17  -     Parathyroid Hormone Intact with Minerals; Future; Expected date: 6/8/17  -     24 Hour Urine Collection Steps Education    Urinary calculus, unspecified  -     Urinalysis Macroscopic      Stone Management Plan  [facility] Stone Management 9/26/2014 4/24/2017 5/25/2017   Urinary Tract Infection No suspicion of infection No suspicion of infection No suspicion of infection   Renal Colic Asymptomatic at this time Well controlled symptoms Asymptomatic at this time   Renal Failure No suspicion of renal failure No suspicion of renal failure No suspicion of renal failure   Current CT date - 4/20/2017 -   Right sided stones? - Yes -   R Number of ureteral stones 0 No ureteral stones -   R Number of kidney stones  - 3 -   R GSD of kidney stones - < 2 -   R Hydronephrosis - None -   R Stone Event - No current event No current event   R Current Plan - Observe Observe   Observe rationale Limited stone burden with good prognosis for spontaneous passage Limited stone burden with good prognosis for spontaneous passage Limited stone burden with good prognosis for spontaneous passage   Left sided stones? - Yes -   L Number of ureteral stones 0 1 -   L GSD of ureteral stones - 5 -   L Location of ureteral stone - Mid -   L Number of kidney stones  - 4 -   L GSD of kidney stones - < 2 -   L Hydronephrosis - Mild -   L Stone Event - New event Resolved event   Diagnosis date - 4/20/2017 -   Initial location of primary symptomatic stone - Mid -   Initial GSD of primary symptomatic stone - 5 -   Resolved date - - 5/20/2017   L MET Status - Initiation Passed   L  Current Plan - MET Observe   MET - 2 week F/U -   Observe rationale Limited stone burden with good prognosis for spontaneous passage - Limited stone burden with good prognosis for spontaneous passage         Subjective:      HPI  Ms. Ellie Echols is a 41 y.o.  female returning to the [facility] [place] for medical expulsive therapy follow up.     On last encounter, her 5 mm stone was in left mid ureter with mild hydronephrosis. She has had no unanticipated events.    She successfully passed the stone and has been feeling well since. Stone analysis from  is 60 % calcium phosphate (apatite) and 40 % calcium oxalate. She is asymptomatic at present. She denies symptoms of fever, chills, flank pain, nausea, vomiting, urinary frequency and dysuria.     Imaging was not performed today because she has passed stone and captured for analysis. Prior CT notable for multiple, bilateral renal papillary tip calcifications.    PLAN    41 F with recurrent calcium based stone disease and previously documented hypercalciuria. Interval spontaneous passage of left mid urolithiasis, now asymptomatic. Bilateral, non-obstructing nephrolithiasis.    She is congratulated on passing her stone and will proceed with updated complete stone risk evaluation. Serum stone risk chemistries including parathyroid hormone and ionized calcium will be obtained before next visit. One 24 hour urine collection and dietary journal will be obtained at earliest covenience.    Patient also seen and examined by Guadalupe Leon PA-C       ROS   Review of systems is negative except for HPI.    Past Medical History:   Diagnosis Date     Bladder infection      Chronic back pain      GERD (gastroesophageal reflux disease)      Hypertension      Kidney stone      Migraine        Past Surgical History:   Procedure Laterality Date     CARPAL TUNNEL RELEASE Right       SECTION      five     SD CYSTOURETHROSCOPY      Description:  Cystoscopy (For Therapy);  Proc Date: 06/15/2012;  Comments: Performed at [facility]     TUBAL LIGATION       URETEROSCOPY         Current Outpatient Prescriptions   Medication Sig Dispense Refill     albuterol (PROVENTIL HFA;VENTOLIN HFA) 90 mcg/actuation inhaler Inhale 2 puffs every 6 (six) hours as needed for wheezing. 1 Inhaler 12     citalopram (CELEXA) 20 MG tablet TAKE 1 TABLET BY MOUTH EVERY DAY 90 tablet 2     losartan-hydrochlorothiazide (HYZAAR) 100-25 mg per tablet Take 1 tablet by mouth daily. 90 tablet 3     montelukast (SINGULAIR) 10 mg tablet Take 1 tablet (10 mg total) by mouth daily. 30 tablet 3     No current facility-administered medications for this visit.        Allergies   Allergen Reactions     Minocycline Hives     Compazine [Prochlorperazine] Anxiety       Social History     Social History     Marital status:      Spouse name: N/A     Number of children: N/A     Years of education: N/A     Occupational History     Not on file.     Social History Main Topics     Smoking status: Current Every Day Smoker     Packs/day: 0.25     Years: 1.00     Smokeless tobacco: Not on file     Alcohol use Yes      Comment: rare     Drug use: No     Sexual activity: Not on file     Other Topics Concern     Not on file     Social History Narrative    She is a  and works at [employer].  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       Family History   Problem Relation Age of Onset     Adopted: Yes     Family history unknown: Yes     Objective:      Physical Exam  Vitals:    05/25/17 1309   BP: 122/77   Pulse: 82   Temp: 98.2  F (36.8  C)     General - well developed, well nourished, appropriate for age. Appears no distress at this time  Abdomen - soft, non-tender, no hepatosplenomegaly, no masses.   - no flank tenderness, no suprapubic tenderness, kidney and bladder non-palpable  MSK - normal spinal curvature. no spinal tenderness. normal gait. muscular strength intact.  Psych -  oriented to time, place, and person, normal mood and affect.      Labs   Urinalysis POC (Office):  Blood UA   Date Value Ref Range Status   04/24/2017 Large Negative Final   09/26/2014 Negative Negative Final     Nitrite, UA   Date Value Ref Range Status   05/25/2017 Negative Negative Final   04/24/2017 Negative Negative Final   01/30/2017 Negative Negative Final   09/26/2014 Negative Negative Final   05/30/2012 Negative (Negative) Final     Leukocytes, UA    Date Value Ref Range Status   04/24/2017 Negative Negative Final   09/26/2014 Negative Negative Final     pH UA   Date Value Ref Range Status   04/24/2017 5.5 4.5 - 8.0 Final   09/26/2014 7.0 4.5 - 8.0 Final       Lab Urinalysis:  Blood, UA   Date Value Ref Range Status   05/25/2017 Negative Negative Final   01/30/2017 Negative Negative Final   05/30/2012 Moderate (!) (Negative) Final     Nitrite, UA   Date Value Ref Range Status   05/25/2017 Negative Negative Final   04/24/2017 Negative Negative Final   01/30/2017 Negative Negative Final   09/26/2014 Negative Negative Final   05/30/2012 Negative (Negative) Final     Leukocytes, UA   Date Value Ref Range Status   05/25/2017 Negative Negative Final   01/30/2017 Negative Negative Final   05/30/2012 Trace (!) (Negative) Final     pH, UA   Date Value Ref Range Status   05/25/2017 7.0 4.5 - 8.0 Final   01/30/2017 7.0 4.5 - 8.0 Final   08/13/2012 5.5 4.5 - 8.0 Final

## 2021-06-12 NOTE — TELEPHONE ENCOUNTER
RN cannot approve Refill Request    RN can NOT refill this medication Protocol failed and NO refill given. Last office visit: 9/26/2019 Maru Starks MD Last Physical: 7/29/2019 Last MTM visit: Visit date not found Last visit same specialty: 9/26/2019 Maru Starks MD.  Next visit within 3 mo: Visit date not found  Next physical within 3 mo: Visit date not found      Parul Bryan, [facility] Triage/Med Refill 10/28/2020    Requested Prescriptions   Pending Prescriptions Disp Refills     triamterene-hydrochlorothiazide (MAXZIDE-25) 37.5-25 mg per tablet [Pharmacy Med Name: TRIAMTERENE-HCTZ 37.5-25 MG TB] 90 tablet 3     Sig: TAKE 1 TABLET BY MOUTH EVERY DAY       Diuretics/Combination Diuretics Refill Protocol  Failed - 10/27/2020 12:38 AM        Failed - Visit with PCP or prescribing provider visit in past 12 months     Last office visit with prescriber/PCP: 9/26/2019 Maru Starks MD OR same dept: Visit date not found OR same specialty: 9/26/2019 Maru Starks MD  Last physical: 7/29/2019 Last MTM visit: Visit date not found   Next visit within 3 mo: Visit date not found  Next physical within 3 mo: Visit date not found  Prescriber OR PCP: Maru Starks MD  Last diagnosis associated with med order: 1. Hypertension  - triamterene-hydrochlorothiazide (MAXZIDE-25) 37.5-25 mg per tablet [Pharmacy Med Name: TRIAMTERENE-HCTZ 37.5-25 MG TB]; TAKE 1 TABLET BY MOUTH EVERY DAY  Dispense: 90 tablet; Refill: 3    If protocol passes may refill for 12 months if within 3 months of last provider visit (or a total of 15 months).             Passed - Serum Potassium in past 12 months      Lab Results   Component Value Date    Potassium 3.8 07/03/2020             Passed - Serum Sodium in past 12 months      Lab Results   Component Value Date    Sodium 138 07/03/2020             Passed - Blood pressure on file in past 12 months     BP Readings from Last 1 Encounters:   07/03/20 124/68              Passed - Serum Creatinine in past 12 months      Creatinine   Date Value Ref Range Status   07/03/2020 0.80 0.60 - 1.10 mg/dL Final

## 2021-06-15 PROBLEM — N20.9 URINARY CALCULUS, UNSPECIFIED: Status: ACTIVE | Noted: 2017-04-24

## 2021-06-16 PROBLEM — G54.2 CERVICAL NERVE ROOT COMPRESSION: Status: ACTIVE | Noted: 2018-05-08

## 2021-06-16 PROBLEM — M48.02 FORAMINAL STENOSIS OF CERVICAL REGION: Status: ACTIVE | Noted: 2018-04-18

## 2021-06-16 PROBLEM — G95.20 CERVICAL CORD COMPRESSION WITH MYELOPATHY (H): Status: ACTIVE | Noted: 2018-05-07

## 2021-06-16 PROBLEM — Z00.00 HEALTH MAINTENANCE EXAMINATION: Status: ACTIVE | Noted: 2019-04-05

## 2021-06-16 NOTE — PROGRESS NOTES
Chief complaint: Allergic reaction to almonds    History of present illness: This is a pleasant 42-year-old woman who is here today for an allergic reaction to almond.  She states she eats almonds regularly.  She ate them last week and did not think much of it.  Immediately after had a very severe stomach ache.  She states the stomach average so severe that she was not able to move around.  She states this happened again when she ate almonds later in the week.  She then notes over the weekend she tried them again thinking perhaps they were the source of a stomachache and developed hives and flushing.  She states she was very itchy.  She is not taking medication.  Noted difficulty breathing.  She denies any illness.  She ate cheese and salami with almonds as well.  She has never had a reaction like this before.  She does not eat most other tree nuts.  She has never had hives.  She does have a history of some environmental allergies.  She states she notes the symptoms during spring and fall and she takes Singulair for the symptoms.  She does have asthma.  This is managed by her primary care physician.    Past medical history: Hypertension, kidney stone    Social history: She works as a , she has a dog and cat, non-smoker    Family history: Positive for allergic rhinitis    Review of Systems performed as above and the remainder is negative.      Current Outpatient Prescriptions:      albuterol (PROVENTIL HFA;VENTOLIN HFA) 90 mcg/actuation inhaler, Inhale 2 puffs every 6 (six) hours as needed for wheezing., Disp: 1 Inhaler, Rfl: 12     citalopram (CELEXA) 20 MG tablet, TAKE 1 TABLET BY MOUTH EVERY DAY, Disp: 90 tablet, Rfl: 2     dicyclomine (BENTYL) 20 mg tablet, Take 20 mg by mouth as needed., Disp: , Rfl:      EPINEPHrine (AUVI-Q) 0.3 mg/0.3 mL injection, Inject into thigh for allergic reaction, Disp: 2 Pre-filled Pen Syringe, Rfl: 0     losartan-hydrochlorothiazide (HYZAAR) 100-25 mg per tablet, Take 1  "tablet by mouth daily., Disp: 90 tablet, Rfl: 3     montelukast (SINGULAIR) 10 mg tablet, Take 1 tablet (10 mg total) by mouth daily., Disp: 30 tablet, Rfl: 3    Allergies   Allergen Reactions     Minocycline Hives     Compazine [Prochlorperazine] Anxiety       /82  Pulse 76  Ht 5' 2.5\" (1.588 m)  Wt 160 lb (72.6 kg)  BMI 28.8 kg/m2  Gen: Pleasant female not in acute distress  HEENT: Eyes no erythema of the bulbar or palpebral conjunctiva, no edema. Mouth: Throat clear, no lip or tongue edema.     Skin: No rashes or lesions  Psych: Alert and oriented times 3    Last Food Skin Allergy Test Results  Tree Nuts  [place]  1:20 (W/F in mm): 3-f (02/13/18 1544)  Pecan  1:20 (W/F in mm): 0-0 (02/13/18 1544)  Hazelnut ([place])  1:20 (W/F in mm): 0-0 (02/13/18 1544)  [place]  1:20 (W/F in mm): 4-f (02/13/18 1544)  Brazil Nut  1:20 (W/F in mm): 0-0 (02/13/18 1544)  Cashew  1:20 (W/F in mm): 0-0 (02/13/18 1544)  Pistachio  1:10 (W/F in mm): 0-0 (02/13/18 1544)  Controls  Device Type: QUINTIP (02/13/18 1544)  Neg. Control: 50% Glycerine-Saline H (W/F in millimeters): 0-0 (02/13/18 1544)  Pos. Control Histamine 6 mg/ml (W/F in millimeters): 4-15 (02/13/18 1544)      Impression report and plan:  1.  [place] allergy    [place] and walnut were positive.  I recommended a one-month challenge and she is interested in exploring this.  Otherwise, I would avoid almonds for now.  Could reassess in 1-2 years.  Recommended food allergy action plan and prescribed Auvi-Q.    2.  Exercise-induced urticaria    Mentioned to me that she does have hives when she exercises.  I recommended Claritin 2 hours prior to exercise.  She will notify me if symptoms are not controlled.  She has never had any breathing difficulty or swelling when she exercises.  I did give her a handout concerning chronic urticaria and specific triggers for chronic urticaria.    Time spent with the patient, 30 minutes, greater than half spent counseling and coordination " of care regarding food allergy.

## 2021-06-16 NOTE — PROGRESS NOTES
[facility] Discharge Summary  Patient Name: Ellei Echols  Date: 4/2/2018  Referral Diagnosis: L cervical radiculitis  Referring provider: Kristin Nava C*  Visit Diagnosis:   1. Cervical radiculitis     2. Numbness and tingling in left upper extremity     3. Poor posture         Goals:  Pt. will be independent with home exercise program in : 4 weeks  Pt. will have improved quality of sleep: with less pain;waking less times/night;in 6 weeks  Pt. will improve posture : and demonstrate posture with minimal to no cuing;in sitting;in standing;for working;in 4 weeks  Patient will sit: 30 minutes;for driving;for work;for watching TV;with less pain;with less difficultty;in 4 weeks  Patient [place]: for driving;for watching traffic;for conversation;for computer;with full ROM;with less pain;with less difficulty;in 4 weeks  Patient will look up / down: for reading;for computer work;with full ROM;with no pain;with less difficulty;in 6 weeks  Patient will decrease : NDI score;for improved quality of function;for improved quality of life;in 6 weeks  Pt will: demonstrate negative median nerve ULNTT on L by 5 weeks.    Patient was seen for 2 visits for physical therapy of L cervical radiculitis from 2/20/18 to 2/22/18 with one canceled appointments.   The patient has not shown for their scheduled appointment(s) and has not called ot reschedule.  Goals were not met.  The patient discontinued therapy, did not return.  No further therapy is required at this time.    Therapy will be discontinued at this time.  The patient will need a new referral to resume physical therapy treatment. Please see below for patient's current status.    Thank you for your referral.  Sadia Hernandez, PT, DPT  4/2/2018   11:46 AM      [facility] Daily Progress     Patient Name: Ellie Echols  Date: 2/22/2018  Visit #: 2/12  Referral Diagnosis: L cervical radiculitis   Referring provider: Kristin Nava  C*  Visit Diagnosis:     ICD-10-CM    1. Cervical radiculitis M54.12    2. Numbness and tingling in left upper extremity R20.0     R20.2    3. Poor posture R29.3        Assessment:     Patient is a 42 y.o. female that presents with signs and symptoms consistent with L cervical radiculopathy  secondary to possible C6-7 nerve impingement. Patient demonstrates impairments including numbness/tingling of LUE with cervical motion, as well as poor posture leading to impaired functional mobility. Patient's functional limitations include sitting at her desk or in the car for too long, looking down and sleeping without increase symptoms.     Today patient is back for her first follow up after being seen 2 days ago for initial evaluation. Patient states she feels about the same however demonstrates increased cervical AROM.    HEP/POC compliance is  good .  Patient demonstrates understanding/independence with home program.  Patient is appropriate to continue with skilled physical therapy intervention, as indicated by initial plan of care.    Goal Status:  Pt. will be independent with home exercise program in : 4 weeks  Pt. will have improved quality of sleep: with less pain;waking less times/night;in 6 weeks  Pt. will improve posture : and demonstrate posture with minimal to no cuing;in sitting;in standing;for working;in 4 weeks  Patient will sit: 30 minutes;for driving;for work;for watching TV;with less pain;with less difficultty;in 4 weeks  Patient [place]: for driving;for watching traffic;for conversation;for computer;with full ROM;with less pain;with less difficulty;in 4 weeks  Patient will look up / down: for reading;for computer work;with full ROM;with no pain;with less difficulty;in 6 weeks  Patient will decrease : NDI score;for improved quality of function;for improved quality of life;in 6 weeks  Pt will: demonstrate negative median nerve ULNTT on L by 5 weeks.      Plan / Patient Education:     Continue with initial  plan of care.  Progress with home program as tolerated.    Plan for next visit: review HEP, remeasure ROM, trial UT and levator stretches, manual nerve glides, cervical upglides and cervical distraction, traction?    Subjective:     Patient reports she is feeling more pain today, with increased numbness and tingling. The pain comes and goes, it can get up to 5-6/10, she switches positions but then it often comes back.     Functional limitations are described as occurring with:   looking down and looking at computer  performing routine daily activities  sleeping  work she sits at a computer and drives alot for work,     Objective:     Cervical ROM:             Date: 2/20/2018       *Indicate scale AROM AROM AROM   Cervical Flexion 36 P       Cervical Extension 26 increase of symptoms          Right Left Right Left Right Left   Cervical Sidebending 30 30 increase of symptoms           Cervical Rotation 70 55 increase of symptoms                 Treatment Today       Patient Education: Patient was educated on continuing plan of care, progress and review of current HEP. Patient educated on importance of consistency with exercise and therapy, as well as activity modification in order to see change and improvements. Patient demonstrated and verbalized understanding.     Manual Therapy:  Cervical longitudinal distraction - found relief with suboccipital release   STM to L UT, cervical paraspinals and suboccipitals  Median nerve glides with SB to R - no reproduction of symptoms  Inferior 1st rib mobilizations - increased finger symptoms       Exercises:  Exercise #1: scapular retraction in sitting - hold 5 sec perofrm all day long  Comment #1: pec doorway stretch - hold 30 sec x 2  Exercise #2: median nerve rockers, sliders and tensioners - 10-20 reps       TREATMENT MINUTES COMMENTS   Evaluation     Self-care/ Home management     Manual therapy 20 Cervical longitudinal distraction - found relief with suboccipital release    STM to L UT, cervical paraspinals and suboccipitals  Median nerve glides with SB to R - no reproduction of symptoms  Inferior 1st rib mobilizations - increased finger symptoms      Neuromuscular Re-education     Therapeutic Activity     Therapeutic Exercises 10 See above flowsheet; Armbike retro/forward 3 minutes   Gait training     Modality__________________                Total 30    Blank areas are intentional and mean the treatment did not include these items.       Sadia Hernandez, PT  2/22/2018

## 2021-06-16 NOTE — PROGRESS NOTES
New patient evaluation  --Neck pain started after getting a massage 3/2015 per pt  --C/O midline posterior neck pain, left scapula, left shoulder pain x 3 years  --Also get pain/numbness down the left upper arm x 3 years  --Numbness/tingling in the left fingers per pt  --Rates pain 6/10 at its worse  --No hx of spine surgery or injections  --Xray cervical spine done with [facility] 3 years ago. Will get CAMPBELL.    Medication   --OTC Tylenol and Aleve PRN

## 2021-06-16 NOTE — PROGRESS NOTES
F/U   --To review MRI lumbar spine 2/27/18  --PT x 2 sessions [facility] for neck pain, which she thinks has increased her pain per pt.  --C/O posterior neck, left shoulder, left scapula pain since 3/20/15, worsening with PT  --Continue to have numbness/burning sensation down the left arm x 3 years as well  --Rates pain 4/10 today  --CAMPBELL signed to get xray of neck from [facility] 3 years ago    Medication  --Flexeril 5 mg QHS PRN, sedating per pt

## 2021-06-16 NOTE — PROGRESS NOTES
Assessment:     Diagnoses and all orders for this visit:    Radiculitis of left cervical region  -     OPS TFESI C/T Spine Unilateral; Future; Expected date: 3/9/18    Numbness and tingling in left arm    Myofascial pain    Foraminal stenosis of cervical region  -     OPS TFESI C/T Spine Unilateral; Future; Expected date: 3/9/18    Cervical stenosis of spine       Ellie Echols is a 42 y.o. y.o. female with past medical history significant for nephrolithiasis, hypertension, migraine headache, chronic major depression, esophageal reflux, hemorrhoids, abdominal pain and abdominal adhesions who presents today for follow-up regarding:     -3 year history of left neck pain and cervical radiculitis C6 and C7 dermatomal pattern with associated paresthesias, aggravated with Physical Therapy.     -Likely secondary mild myofascial pain and slightly elevated right trapezius.    Patient is neurologically intact on exam today    Plan:     A shared decision making plan was used.  The patient's values and choices were respected. Prior medical records from 2/19/18 were reviewed today. The following represents what was discussed and decided upon by the provider and the patient.     -DIAGNOSTIC TESTS: Images were personally reviewed and interpreted.   --Cervical spine MRI 7/27/2018 reveals severe left foraminal stenosis due to disc osteophyte complex at C6-7 impinging left C7 nerve root.  Moderate right foraminal stenosis and moderate central canal stenosis.  C4-5 moderate right foraminal stenosis and moderate central canal stenosis.  C5-6 mild right foraminal stenosis.     -INTERVENTIONS: Ordered left C6-7 transfemoral epidural steroid injection to target severe foraminal stenosis at this level contributing to left cervical radiculitis.    -MEDICATIONS: Did discuss the possibility of gabapentin medication low-dose 100 mg 1 tablet 3 times daily for radicular pain however she prefers to hold off on any other medication due to  sensitivity typically to meds.    Discussed side effects of medications and proper use. Patient verbalized understanding.    -PHYSICAL THERAPY: Advised patient continue with home exercise program with exercises that are not aggravating for her, recommend continuing physical therapy to establish home exercise program that she tolerates, her hope is with the injection that she will be able to tolerate physical therapy better.  Discussed the importance of core strengthening, ROM, stretching exercises with the patient and how each of these entities is important in decreasing pain.  Explained to the patient that the purpose of physical therapy is to teach the patient a home exercise program.  These exercises need to be performed every day in order to decrease pain and prevent future occurrences of pain.        -PATIENT EDUCATION: 20 minutes of total visit time was spent face to face with the patient today, 60 % of the visit was spent on counseling, education, and coordinating care.   -5 minutes spent outside of visit time, non-face-to-face time, reviewing chart.    -FOLLOW UP: Follow-up for injection with Dr. Kwan than 2 weeks postinjection  Advised to contact clinic if symptoms worsen or change.    Subjective:     Ellie Echols is a 42 y.o. female who presents today for follow-up regarding lower cervical spine pain radiating to the left subscapular trapezius, triceps and forearm and into the first second and third fingers on the left with associated numbness and tingling ongoing for the last 3 years off and on that is been more severe in the last 2 months.  She did trial to physical therapy sessions however felt so far it has been aggravating her symptoms and making it worse, currently her pain is 4/10, a 2 to its best but up to a 6 at its worst.  She reports that she tolerates the exercises while doing them however typically after general pain is much more significant.    Patient denies right arm symptoms,  denies upper extremity weakness bilaterally, denies balance changes, denies recent trips or falls, denies bowel or bladder dysfunction.    No myelopathic symptoms.     -Treatment to Date: No prior spinal surgery or spinal injection.  Chiropractic manipulation and acupuncture 2 years ago with short-term relief only.  Physical therapy ×2 sessions [facility] 2/20/2018 cervical radiculitis.     -Medications:  Tylenol/Aleve over-the-counter with minimal relief.  Not currently taking.  Flexeril 5 mg 1-2 tablets at bedtime as needed.    Patient Active Problem List   Diagnosis     Lower Back Pain     Nephrolithiasis     Hypertension     Migraine Headache     Internal Hemorrhoids     Chronic Major Depression     Esophageal reflux     Urinary calculus, unspecified     Lower abdominal pain       Current Outpatient Prescriptions on File Prior to Encounter   Medication Sig Dispense Refill     citalopram (CELEXA) 20 MG tablet TAKE 1 TABLET BY MOUTH EVERY DAY 90 tablet 2     cyclobenzaprine (FLEXERIL) 5 MG tablet TAKE 1-2 TABLETS BY MOUTH AT BEDTIME AS NEEDED FOR MUSCLE SPASMS. 30 tablet 0     dicyclomine (BENTYL) 20 mg tablet Take 20 mg by mouth as needed.       EPINEPHrine (AUVI-Q) 0.3 mg/0.3 mL injection Inject into thigh for allergic reaction 2 Pre-filled Pen Syringe 0     L. ACIDOPHILUS/BIFID. ANIMALIS (DAILY PROBIOTIC ORAL) Take 1 tablet by mouth daily.       losartan-hydrochlorothiazide (HYZAAR) 100-25 mg per tablet Take 1 tablet by mouth daily. 90 tablet 3     MULTIVIT-MINERALS/FERROUS FUM (MULTI VITAMIN ORAL) Take 1 tablet by mouth daily.       OMEGA-3/DHA/EPA/FISH OIL (FISH OIL-OMEGA-3 FATTY ACIDS) 300-1,000 mg capsule Take 2 g by mouth daily. Plant base omega       No current facility-administered medications on file prior to encounter.        Allergies   Allergen Reactions     [place]      Minocycline Hives     [place]      Compazine [Prochlorperazine] Anxiety       Past Medical History:   Diagnosis Date     Asthma       Bladder infection      Chronic back pain      GERD (gastroesophageal reflux disease)      Hypertension      Kidney stone      Migraine      Painful swelling of joint         Review of Systems  ROS: Positive for numbness and tingling and headache.  Specifically negative for bowel/bladder dysfunction, balance changes, dizziness, foot drop, fevers, chills, appetite changes, nausea/vomiting, unexplained weight loss. Otherwise 13 systems reviewed are negative. Please see the patient's intake questionnaire from today for details.    Reviewed Social, Family, Past Medical and Past Surgical history with patient, no significant changes noted since prior visit.     Objective:     /87 (Patient Site: Left Arm, Patient Position: Sitting)  Pulse 87  Wt 156 lb (70.8 kg)  SpO2 99%  BMI 28.08 kg/m2    PHYSICAL EXAMINATION:   --CONSTITUTIONAL: Vital signs as above. No acute distress. The patient is well nourished and well groomed.  --PSYCHIATRIC:  The patient is awake, alert, oriented to person, place, time and answering questions appropriately with clear speech. Appropriate mood and affect   --HEENT: Sclera are non-injected. Extraocular muscles are intact.   --SKIN: Skin over the face, bilateral upper extremities, and posterior torso is clean, dry, intact without rashes.  --RESPIRATORY: Normal rhythm and effort. No abnormal accessory muscle breathing patterns noted.   --GROSS MOTOR: Easily arises from a seated position.   --CERVICAL SPINE: Inspection reveals no evidence of deformity, slight elevation of right trapezius. Range of motion is not limited in cervical flexion, extension, lateral rotation. No tenderness to palpation cervical spine.  Spurling maneuver negative bilaterally.  --SHOULDERS: Full range of motion bilaterally. Negative empty can.  --UPPER EXTREMITY MOTOR TESTING:  Wrist flexion left 5/5, right 5/5  Wrist extension left 5/5, right 5/5  Pronators left 5/5, right 5/5  Biceps left 5/5, right 5/5    Triceps left 5/5, right 5/5   Shoulder abduction left 5/5, right 5/5   left 5/5, right 5/5  --NEUROLOGIC: CN III-XII are grossly intact. 2/4 symmetric biceps, brachioradialis, triceps reflexes bilaterally. Sensation to upper extremities is intact. Negative Montoya's bilaterally.   --VASCULAR: Warm upper limbs bilaterally.     Imaging of the cervical spine: Cervical spine imaging was reviewed today. The images were shown to the patient and the findings were explained using a spine model.      Mr Cervical Spine Without Contrast  Result Date: 2/28/2018  INDICATION: Chronic neck pain and left C6-C7 radiculitis. TECHNIQUE: Unenhanced. COMPARISON: None. FINDINGS: C2-C3: Normal disc height. Facet joints negative. No central canal stenosis. No foraminal stenosis. C3-C4: Normal disc height. Mild posterolateral spondylotic ridging and left worse than right uncinate spurring.  Minimal annular bulge. No focal disc herniation. Facet joints negative. Mild central canal stenosis. Mild bilateral foraminal stenosis, left worse than right. C4-C5: Normal disc height. Mild spondylotic ridging and uncinate spurring eccentric right. Small shallow central disc protrusion, and right posterolateral disc osteophyte complex. Facet joints negative. Moderate central canal stenosis, AP thecal sac diameter 7 mm. Moderate right foraminal stenosis. No left foraminal stenosis. C5-C6: Normal disc height. Mild spondylotic ridging. Annular bulge. Facet joints negative. Mild central canal stenosis. Mild right foraminal stenosis. No left foraminal stenosis. C6-C7: Mild disc space loss. Spondylotic ridging and uncinate hypertrophy eccentric left. Annular bulge. Shallow left posterolateral disc osteophyte complex. Moderate central canal stenosis, AP thecal sac diameter 7 mm. Severe left foraminal stenosis. Moderate right foraminal stenosis. C7-T1: Normal disc height. Slight annular bulge. Facet joints negative. No central canal stenosis. No foraminal  stenosis. Craniocervical junction negative. Cervical cord negative. No significant marrow signal abnormality. No significant paravertebral soft tissue abnormality. There is mild central canal stenosis at T3-T4 secondary to annular bulging and ligamentum flavum thickening or calcification.   CONCLUSION:   1.  Degenerative changes and spondylosis in the cervical spine as described above.   2.  At C6-C7, there is severe left foraminal stenosis secondary to disc osteophyte complex with likely impingement on the exiting left C7 nerve roots. There is moderate right foraminal stenosis and moderate central canal stenosis at this level.   3.  At C4-C5, there is moderate right foraminal stenosis and moderate central canal stenosis.   4.  At C5-C6, there is mild right foraminal stenosis and mild central canal stenosis.

## 2021-06-16 NOTE — PROGRESS NOTES
ASSESSMENT: Ellie Echols is a 42 y.o. female presents for consultation at the request of HE PCP Liss Cornell MD, with past medical history significant for nephrolithiasis, hypertension, migraine headache, chronic major depression, esophageal reflux, hemorrhoids, abdominal pain and abdominal adhesions, who presents today for new patient evaluation of :     -3 year history of left neck pain and cervical radiculitis C6 and C7 dermatomal pattern with associated paresthesias.    -Likely secondary mild myofascial pain and slightly elevated right trapezius.    Patient is neurologically intact on exam. No myelopathic or red flag symptoms.  No increased pain with shoulder movement/range of motion.    NDI Score: 34    WHO 5: 18     PHQ-2: 0      Diagnoses and all orders for this visit:    Radiculitis of left cervical region  -     MR Cervical Spine Without Contrast; Future; Expected date: 2/19/18  -     Ambulatory referral to PT/OT  -     cyclobenzaprine (FLEXERIL) 5 MG tablet; Take 1-2 tablets (5-10 mg total) by mouth at bedtime as needed for muscle spasms.  Dispense: 30 tablet; Refill: 0    Numbness and tingling in left arm  -     MR Cervical Spine Without Contrast; Future; Expected date: 2/19/18  -     Ambulatory referral to PT/OT    Myofascial pain  -     Ambulatory referral to PT/OT  -     cyclobenzaprine (FLEXERIL) 5 MG tablet; Take 1-2 tablets (5-10 mg total) by mouth at bedtime as needed for muscle spasms.  Dispense: 30 tablet; Refill: 0       PLAN:  Reviewed spine anatomy and disease process. Discussed diagnosis and treatment options with the patient today. A shared decision making model was used. The patient's values and choices were respected. The following represents what was discussed and decided upon by the provider and the patient.     -DIAGNOSTIC TESTS:  --Ordered cervical spine MRI to further evaluate three-year history of cervical radiculitis for etiology.  --Also requested previous cervical  spine x-ray from [facility] today.    -PHYSICAL THERAPY: Referral to physical therapy [facility] for cervical core strengthening nerve glide exercises and myofascial release.  Discussed the importance of core strengthening, ROM, stretching exercises with the patient and how each of these entities is important in decreasing pain.  Explained to the patient that the purpose of physical therapy is to teach the patient a home exercise program.  These exercises need to be performed every day in order to decrease pain and prevent future occurrences of pain.  Likened it to brushing one's teeth.      -MEDICATIONS: Prescribed Flexeril 5 mg 1-2 tablets at nighttime as needed for myofascial pain as well as to help with sleep.  -Did discuss gabapentin as a possibility for radicular arm pain as well however she would like to hold off on this medication at this time.  Can consider it pending MRI review if she is not getting relief with physical therapy.  Discussed multiple medication options today with patient. Discussed risks, side effects, and proper use of medications. Patient verbalized understanding.    -INTERVENTIONS: Did discuss the possibility of injections as well down the road if she is not getting relief with physical therapy and medications, pending MRI.  Discussed risks and benefits of injections with patient today.    -PATIENT EDUCATION: 45 minutes of total visit time was spent face to face with the patient today, greater than 50% of total time spent with patient was spent on counseling, education, and coordinating care.   -10 minutes spent outside of visit time, non-face-to-face time, reviewing chart.    -FOLLOW-UP:   Follow-up after obtaining cervical spine MRI to review images and ongoing plan.    Advised patient to call the [facility] if symptoms worsen or you have problems controlling bladder and bowel function.    ______________________________________________________________________    SUBJECTIVE:   Ellie Echols  is a 42 y.o. female who presents today for new patient evaluation of neck pain lower cervical spine that does radiate to the left subscapular region left lateral and posterior tricep, forearm and into first 4 fingers more so in the thumb and first finger with associated numbness and tingling ongoing ×3 years that has been more off and on, worse in the last 6 months and more severe in the last 2 months.  Currently she reports her pain is a 6/10 burning type pain that is worse with elevating her arm such as driving as well as working and repetitive head movements.    Patient denies right arm symptoms, denies upper extremity weakness, denies recent balance changes, trips or falls, denies bowel or bladder dysfunction.  ]  She denies any injury prior to onset of pain however she was a gymnast in the past.    -Treatment to Date: No prior spinal surgery or spinal injection.  Chiropractic manipulation and acupuncture 2 years ago with short-term relief only.    -Medications:  Tylenol/Aleve over-the-counter with minimal relief.  Not currently taking.    Current Outpatient Prescriptions on File Prior to Encounter   Medication Sig Dispense Refill     citalopram (CELEXA) 20 MG tablet TAKE 1 TABLET BY MOUTH EVERY DAY 90 tablet 2     dicyclomine (BENTYL) 20 mg tablet Take 20 mg by mouth as needed.       EPINEPHrine (AUVI-Q) 0.3 mg/0.3 mL injection Inject into thigh for allergic reaction 2 Pre-filled Pen Syringe 0     losartan-hydrochlorothiazide (HYZAAR) 100-25 mg per tablet Take 1 tablet by mouth daily. 90 tablet 3     No current facility-administered medications on file prior to encounter.        Allergies   Allergen Reactions     [place]      Minocycline Hives     [place]      Compazine [Prochlorperazine] Anxiety       Past Medical History:   Diagnosis Date     Asthma      Bladder infection      Chronic back pain       GERD (gastroesophageal reflux disease)      Hypertension      Kidney stone      Migraine      Painful swelling of joint         Patient Active Problem List   Diagnosis     Lower Back Pain     Nephrolithiasis     Hypertension     Migraine Headache     Internal Hemorrhoids     Chronic Major Depression     Esophageal reflux     Urinary calculus, unspecified     Lower abdominal pain       Past Surgical History:   Procedure Laterality Date     CARPAL TUNNEL RELEASE Right       SECTION      five     ND CYSTOURETHROSCOPY      Description: Cystoscopy (For Therapy);  Proc Date: 06/15/2012;  Comments: Performed at [facility]     TUBAL LIGATION       URETEROSCOPY         Family History   Problem Relation Age of Onset     Adopted: Yes     Family history unknown: Yes       Reviewed past medical, surgical, and family history with patient found on new patient intake packet located in EMR Media tab.     SOCIAL HX: Patient denies smoking/tobacco use.  Reports very rare alcohol use, denies being a heavy drinker, denies recreational drug use.  Patient does work out on a regular basis specifically with running and notices her pain usually is better for the first couple of hours after running.    ROS: Positive for poor sleep quality, joint pain, diarrhea.  Specifically negative for bowel/bladder dysfunction, balance changes, headache, dizziness, foot drop, fevers, chills, appetite changes, nausea/vomiting, unexplained weight loss. Otherwise 13 systems reviewed are negative. Please see the patient's intake questionnaire from today for details.    OBJECTIVE:  /85 (Patient Site: Left Arm, Patient Position: Sitting)  Pulse 92  Wt 160 lb (72.6 kg)  BMI 28.8 kg/m2    PHYSICAL EXAMINATION:  --CONSTITUTIONAL: Vital signs as above. No acute distress. The patient is well nourished and well groomed.  --PSYCHIATRIC: The patient is awake, alert, oriented to person, place, time and answering questions appropriately with clear speech.  Appropriate mood and affect   --HEENT: Sclera are non-injected. Extraocular muscles are intact. Thyroid moves easily upon swallowing.  Moist oral mucosa.  --SKIN: Skin over the face, bilateral upper extremities, and posterior torso is clean, dry, intact without rashes.  --RESPIRATORY: Normal rhythm and effort. No abnormal accessory muscle breathing patterns noted.   --GROSS MOTOR: Easily arises from a seated position. Toe walking and heel walking are normal.    --CERVICAL SPINE: Inspection reveals no evidence of deformity, slight elevation of right trapezius. Range of motion is not limited in cervical flexion, extension, lateral rotation. No tenderness to palpation cervical spine.  Spurling maneuver negative bilaterally.  --SHOULDERS: Full range of motion bilaterally. Negative empty can.  --UPPER EXTREMITY MOTOR TESTING:  Wrist flexion left 5/5, right 5/5  Wrist extension left 5/5, right 5/5  Pronators left 5/5, right 5/5  Biceps left 5/5, right 5/5   Triceps left 5/5, right 5/5   Shoulder abduction left 5/5, right 5/5   left 5/5, right 5/5  --NEUROLOGIC: CN III-XII are grossly intact. 2/4 symmetric biceps, brachioradialis, triceps reflexes bilaterally. Sensation to upper extremities is intact.  Negative Montoya's bilaterally.    --VASCULAR: 2/4 radial pulses bilaterally. Warm upper limbs bilaterally. Capillary refill in the upper extremities is less than 1 second.    RESULTS: Prior medical records from [facility] 4/24/2017 current and care [facility] 4/20/2017 current were reviewed today.     Imaging:  No results found.
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
## 2021-06-17 NOTE — PROGRESS NOTES
Preop Assessment: Ellie Echols presents for pre-op review.  Surgeon: Dr. Schofield  Name of Surgery: CERVICAL 3 - CERVICAL 7 OPEN LEFT LAMINOPLASTY LEFT CERVICAL 6-7 MICRODISCECTOMY  Diagnosis: Cord compression myelopathy, cervical nerve root compression  Date of Surgery: 18  Time of Surgery: 1000  Hospital: [facility]  H&P: 18 Dr. Liss Cornell cleared pt for surgery  History of ASA, NSAIDS, vitamin and/or herbal supplements within 10 days: No  History of blood thinners: No  History of anti-seizure med's: No  Review of systems: feels well today     Last BM: 18  Hx nausea/vomiting: denies  Hx urinary retention:  denies  Pain med management: PCP, no red flags  Home PT eval: home independent, difficult time sitting with arms extended  Patient confirmed they have help/assistance in place at home upon discharge: home with   IS [id number]    Diagnostics:  Labs: 18   CXR: not ordered  [id number] NSR  Films: MRI Cervical 18    All questions answered regarding surgery and expected pre and postoperative course including rehabilitation phase.     Reviewed with patient: Arrive 2.5 hours prior to scheduled surgery, nothing to eat or drink after midnight the night before surgery and bring all pertinent films to the hospital the day of surgery.  Continue to refrain from NSAIDS (Ibuprofen, Aleve, Naprosyn) ASA or over the counter herbal medication or supplements, anticoagulants and blood thinners.    Preop skin preparations and instructions provided. Using a washcloth and a bottle of provided Hibiclens, wash your body, avoiding your face and genitals. Preferably, shower the night before surgery and the morning of surgery using a half a bottle each time for your whole body shower. If you are unable to take a shower in the morning of surgery, please discuss your options with the nurse at your readiness visit.    Surgical consent signed at surgical readiness visit on 18    Fitted for  cervical collar at readiness visit on 5-2-18    Jenise Flores RN

## 2021-06-17 NOTE — PATIENT INSTRUCTIONS - HE
Patient Instructions by Nikolai Calixto PT at 1/16/2019 11:30 AM     Author: Nikolai Calixto PT Service: -- Author Type: Physical Therapist    Filed: 1/16/2019 11:58 AM Encounter Date: 1/16/2019 Status: Signed    : Nikolai Calixto, PT (Physical Therapist)        SCALENE STRETCH    Place your hands overlapping on your breast bone. Next, tilt you head upwards and away from the affected side until a gentle stretch is felt along the front and side of your neck.    Hold 15 seconds  2X/side  2X/day

## 2021-06-17 NOTE — ANESTHESIA CARE TRANSFER NOTE
Pt breathing spontaneously, follows commands, suctioned extubated. Spontaneous respirations with SFM to PACU. VSS.    Last vitals:   Vitals:    05/07/18 1313   BP: 162/81   Pulse: 92   Resp: 16   Temp: 36.2  C (97.1  F)   SpO2: 100%     Patient's level of consciousness is drowsy  Spontaneous respirations: yes  Maintains airway independently: yes  Dentition unchanged: yes  Oropharynx: oropharynx clear of all foreign objects    QCDR Measures:  ASA# 20 - Surgical Safety Checklist: WHO surgical safety checklist completed prior to induction  PQRS# 430 - Adult PONV Prevention: 4558F - Pt received => 2 anti-emetic agents (different classes) preop & intraop  ASA# 8 - Peds PONV Prevention: NA - Not pediatric patient, not GA or 2 or more risk factors NOT present  PQRS# 424 - [place]-op Temp Management: 4559F - At least one body temp DOCUMENTED => 35.5C or 95.9F within required timeframe  PQRS# 426 - PACU Transfer Protocol: - Transfer of care checklist used  ASA# 14 - Acute Post-op Pain: ASA14B - Patient did NOT experience pain >= 7 out of 10

## 2021-06-17 NOTE — ANESTHESIA POSTPROCEDURE EVALUATION
Patient: Ellie Echols  CERVICAL 4 - CERVICAL 7 OPEN LEFT LAMINOPLASTY   Anesthesia type: general    Patient location: PACU  Last vitals:   Vitals:    05/07/18 1345   BP: (!) (P) 144/92   Pulse:    Resp:    Temp:    SpO2:      Post vital signs: stable  Level of consciousness: awake and responds to simple questions  Post-anesthesia pain: pain controlled  Post-anesthesia nausea and vomiting: no  Pulmonary: unassisted, return to baseline  Cardiovascular: stable and blood pressure at baseline  Hydration: adequate  Anesthetic events: no    QCDR Measures:  ASA# 11 - [place]-op Cardiac Arrest: ASA11B - Patient did NOT experience unanticipated cardiac arrest  ASA# 12 - [facility]-op Mortality Rate: ASA12B - Patient did NOT die  ASA# 13 - PACU Re-Intubation Rate: ASA13B - Patient did NOT require a new airway mgmt  ASA# 10 - Composite Anes Safety: ASA10A - No serious adverse event    Additional Notes:

## 2021-06-17 NOTE — PROGRESS NOTES
Preoperative Exam    Scheduled Procedure: neck surgery  Surgery Date:  5/9/18  Surgery Location: [facility], fax [phone]    Surgeon:  Dr. Yu Schofield    Assessment/Plan:     1. Preop exam for internal medicine  Appears to be stable for the proposed surgery.  Will check labs as noted below, she will hold her losartan the day of surgery.  - Electrocardiogram Perform and Read  - Basic Metabolic Panel  - HM2(CBC w/o Differential)  - Urinalysis-UC if Indicated  - Pregnancy (Beta-hCG, Qual), Urine    2. Foraminal stenosis of cervical region  Scheduled for cervical laminoplasty.    Surgical Procedure Risk: Intermediate (reported cardiac risk generally 1-5%)  Have you had prior anesthesia?: Yes  Have you or any family members had a previous anesthesia reaction:  No  Do you or any family members have a history of a clotting or bleeding disorder?: No  Cardiac Risk Assessment: no increased risk for major cardiac complications    Patient approved for surgery with general or local anesthesia.        Functional Status: Independent  Patient plans to recover at home with family.     Subjective:      Ellie Echols is a 42 y.o. female who presents for a preoperative consultation.  She developed neck pain originally about 3 years ago but it has been getting progressively worse and is now accompanied by cervical radiculopathy symptoms.  She has had to take off work due to the pain and inability to function well including driving or any repetitive head or arm movements.  She saw me last week and we started her on some gabapentin and Percocet.  She believes the pain is slightly better.  She is only taking gabapentin 100 mg 3 times a day and is using the Percocet mainly at night.  She is scheduled for cervical correction of the foraminal and canal stenosis.    All other systems reviewed and are negative, other than those listed in the HPI.    Pertinent History  Do you have difficulty breathing or chest pain after walking  up a flight of stairs: No  History of obstructive sleep apnea: No  Steroid use in the last 6 months: Yes: Not long term systemic  Frequent Aspirin/NSAID use: No  Prior Blood Transfusion: No  Prior Blood Transfusion Reaction: No  If for some reason prior to, during or after the procedure, if it is medically indicated, would you be willing to have a blood transfusion?:  There is no transfusion refusal.    Current Outpatient Prescriptions   Medication Sig Dispense Refill     citalopram (CELEXA) 20 MG tablet TAKE 1 TABLET BY MOUTH EVERY DAY 90 tablet 5     cyclobenzaprine (FLEXERIL) 5 MG tablet TAKE 1-2 TABLETS BY MOUTH AT BEDTIME AS NEEDED FOR MUSCLE SPASMS. 30 tablet 0     dicyclomine (BENTYL) 20 mg tablet Take 20 mg by mouth as needed.       EPINEPHrine (AUVI-Q) 0.3 mg/0.3 mL injection Inject into thigh for allergic reaction 2 Pre-filled Pen Syringe 0     gabapentin (NEURONTIN) 100 MG capsule Take 200 mg by mouth 3 (three) times a day. 90 capsule prn     L. ACIDOPHILUS/BIFID. ANIMALIS (DAILY PROBIOTIC ORAL) Take 1 tablet by mouth daily.       losartan-hydrochlorothiazide (HYZAAR) 100-25 mg per tablet TAKE 1 TABLET BY MOUTH DAILY. 90 tablet 5     MULTIVIT-MINERALS/FERROUS FUM (MULTI VITAMIN ORAL) Take 1 tablet by mouth daily.       OMEGA-3/DHA/EPA/FISH OIL (FISH OIL-OMEGA-3 FATTY ACIDS) 300-1,000 mg capsule Take 2 g by mouth daily. Plant base omega       oxyCODONE-acetaminophen (PERCOCET) 5-325 mg per tablet Take 1-2 tablets by mouth every 4 (four) hours as needed for pain. 40 tablet 0     VENTOLIN HFA 90 mcg/actuation inhaler INHALE 2 PUFFS EVERY 6 (SIX) HOURS AS NEEDED FOR WHEEZING. 18 Inhaler 5     No current facility-administered medications for this visit.         Allergies   Allergen Reactions     [place]      Minocycline Hives     [place]      Compazine [Prochlorperazine] Anxiety       Patient Active Problem List   Diagnosis     Lower Back Pain     Nephrolithiasis     Hypertension     Migraine Headache      "Internal Hemorrhoids     Chronic Major Depression     Esophageal reflux     Urinary calculus, unspecified     Foraminal stenosis of cervical region       Past Medical History:   Diagnosis Date     Asthma      Bladder infection      Chronic back pain      GERD (gastroesophageal reflux disease)      Hypertension      Kidney stone      Migraine      Painful swelling of joint        Past Surgical History:   Procedure Laterality Date     CARPAL TUNNEL RELEASE Right       SECTION      five     NM CYSTOURETHROSCOPY      Description: Cystoscopy (For Therapy);  Proc Date: 06/15/2012;  Comments: Performed at [facility]     TUBAL LIGATION       URETEROSCOPY         Social History     Social History     Marital status:      Spouse name: N/A     Number of children: N/A     Years of education: N/A     Occupational History     Not on file.     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             Objective:     Vitals:    18 1045   BP: 118/86   Pulse: 76   SpO2: 99%   Weight: 154 lb 9 oz (70.1 kg)   Height: 5' 3\" (1.6 m)         Physical Exam:  EYES: Eyelids, conjunctiva, and sclera were normal. Pupils were normal.   HEAD, EARS, NOSE, MOUTH, AND THROAT: Head is atraumatic, ears and canals are normal with normal tympanic membranes.  Nose mouth and throat are without lesions.  NECK: Neck appearance was normal. There were no neck masses and the thyroid was not enlarged.  RESPIRATORY: Normal respirations.  Lung sounds were equal bilaterally.  CARDIOVASCULAR: Heart rate and rhythm were normal.  S1 and S2 were normal and " there were no extra sounds or murmurs. There was no peripheral edema.  GASTROINTESTINAL: The abdomen was soft and nondistended.     MUSCULOSKELETAL: Skeletal configuration was normal and muscle mass was normal for age. Joint appearance was overall normal.  LYMPHATIC: There were no enlarged nodes palpable.  SKIN/HAIR/NAILS: Skin color was normal.  No rashes.  NEUROLOGIC: The patient was alert and oriented.  Speech was normal.  There is no facial asymmetry.   PSYCHIATRIC:  Mood and affect were normal.         Patient Instructions       Hold all supplements, aspirin and NSAIDs for 7 days prior to surgery.    Follow your surgeon's direction on when to stop eating and drinking prior to surgery.    Your surgeon will be managing your pain after your surgery.      Hold all of your medications including the losartan the day of surgery.      EKG: Normal sinus rhythm with no acute ST-T changes    Labs:  Recent Results (from the past 24 hour(s))   Electrocardiogram Perform and Read    Collection Time: 04/25/18  9:52 AM   Result Value Ref Range    SYSTOLIC BLOOD PRESSURE  mmHg    DIASTOLIC BLOOD PRESSURE  mmHg    VENTRICULAR RATE 72 BPM    ATRIAL RATE 72 BPM    P-R INTERVAL 154 ms    QRS DURATION 98 ms    Q-T INTERVAL 400 ms    QTC CALCULATION (BEZET) 438 ms    P Axis 55 degrees    R AXIS 13 degrees    T AXIS 26 degrees    MUSE DIAGNOSIS       Normal sinus rhythm  Normal ECG  When compared with ECG of 08-JUN-2012 07:53,  T wave inversion no longer evident in Anterior leads     HM2(CBC w/o Differential)    Collection Time: 04/25/18 11:07 AM   Result Value Ref Range    WBC 3.0 (L) 4.0 - 11.0 thou/uL    RBC 4.69 3.80 - 5.40 mill/uL    Hemoglobin 13.8 12.0 - 16.0 g/dL    Hematocrit 41.1 35.0 - 47.0 %    MCV 88 80 - 100 fL    MCH 29.3 27.0 - 34.0 pg    MCHC 33.4 32.0 - 36.0 g/dL    RDW 11.8 11.0 - 14.5 %    Platelets 216 140 - 440 thou/uL    MPV 7.3 7.0 - 10.0 fL   Urinalysis-UC if Indicated    Collection Time: 04/25/18 11:07 AM    Result Value Ref Range    Color, UA Yellow Colorless, Yellow, Straw, Light Yellow    Clarity, UA Clear Clear    Glucose, UA Negative Negative    Bilirubin, UA Negative Negative    Ketones, UA 15 mg/dL (!) Negative    Specific Gravity, UA 1.015 1.005 - 1.030    Blood, UA Negative Negative    pH, UA 7.5 5.0 - 8.0    Protein, UA Negative Negative mg/dL    Urobilinogen, UA 0.2 E.U./dL 0.2 E.U./dL, 1.0 E.U./dL    Nitrite, UA Negative Negative    Leukocytes, UA Negative Negative   Pregnancy (Beta-hCG, Qual), Urine    Collection Time: 04/25/18 11:07 AM   Result Value Ref Range    Pregnancy Test, Urine Negative Negative    Specific Gravity, UA 1.015 1.001 - 1.030       Immunization History   Administered Date(s) Administered     Influenza, Seasonal, Inj PF IIV3 09/25/2012, 01/07/2016     Influenza, inj, historic,unspecified 11/05/2008, 11/02/2011     Td,adult,historic,unspecified 07/31/2006     Tdap 07/31/2006           Electronically signed by Liss Cornell MD 04/25/18 10:43 AM

## 2021-06-17 NOTE — PROGRESS NOTES
Neurosurgery consultation was requested by: Paige Nava CNP  Pain: Denies neck pain   Radicular Pain is present: Pain LUE  Lhermitte sign: No  Motor complaints: Denies weakness  Sensory complaints: Numbness and tingling in left arm and into thumb and 2nd and 3rd finger on left hand   Gait and balance issues: Denies   Bowel or bladder issues: Denies   Duration of SX is: 3 years, but progressed in last 6 months   The symptoms are worse with: everything   The symptoms are better with: sitting in recliner with legs up   Injury: Denies   Severity is: Moderate  Patient has tried the following conservative measures: Pt has done PT and made symptoms worse. She had a Left C6-7 and helped pain but made numbness worse and only lasted one day.   NDI score is :   CLIFTON Mitchell

## 2021-06-17 NOTE — ANESTHESIA PREPROCEDURE EVALUATION
Anesthesia Evaluation      Patient summary reviewed   No history of anesthetic complications     Airway   Mallampati: II  Neck ROM: full   Pulmonary - normal exam   (+) asthma  mild,  well controlled, a smoker                         Cardiovascular - normal exam  Exercise tolerance: > or = 4 METS  (+) hypertension, ,     ECG reviewed        Neuro/Psych    (+) chronic pain    Endo/Other    (+) arthritis,      GI/Hepatic/Renal    (+) GERD intermittent,   chronic renal disease,      Other findings: Lower Back Pain     Nephrolithiasis    Hypertension    Migraine Headache    Internal Hemorrhoids    Chronic Major Depression    Esophageal reflux    Urinary calculus, unspecified    Foraminal stenosis of cervical region             Dental - normal exam                        Anesthesia Plan  Planned anesthetic: general endotracheal  50 mg ketamine IV on induction.    ASA 2   Induction: intravenous   Anesthetic plan and risks discussed with: patient  Anesthesia plan special considerations: video-assisted, antiemetics,   Post-op plan: routine recovery

## 2021-06-17 NOTE — PROGRESS NOTES
Post injection F/U  --3/14/18 left C6-C7 TFESI  --Only got minimal pain relief x 1 1/2 day per pt  --Per pt, she got increase numbness in the left arm post injection  --C/O ongoing pain/numbness down the left arm since 3/20/15, no change  --Rates pain 6/10 left arm  --PT x 2 [facility] 2/22/18 for neck    Medication  --Flexeril 5 mg QHS PRN

## 2021-06-17 NOTE — PROGRESS NOTES
Assessment:     Diagnoses and all orders for this visit:    Radiculitis of left cervical region  -     Ambulatory referral to Neurosurgery    Foraminal stenosis of cervical region  -     Ambulatory referral to Neurosurgery    Numbness and tingling in left arm  -     Ambulatory referral to Neurosurgery    Myofascial pain    Cervical stenosis of spine  -     Ambulatory referral to Neurosurgery       Ellie Echols is a 42 y.o. y.o. female with past medical history significant for nephrolithiasis, hypertension, migraine headache, chronic major depression, esophageal reflux, hemorrhoids, abdominal pain and abdominal adhesions who presents today for follow-up regarding:     -Three-year history left neck pain with cervical radiculitis C6 and C7 dermatomal pattern in which she did get initial relief with left C6-7 TF BELL however pain is back to baseline.  She does report that the numbness and tingling left arm actually has worsened postinjection.    She is neurologically intact on exam as well.    Plan:     A shared decision making plan was used.  The patient's values and choices were respected. Prior medical records from 3/9/18 were reviewed today. The following represents what was discussed and decided upon by the provider and the patient.     -DIAGNOSTIC TESTS: Images were personally reviewed and interpreted.    --Cervical spine MRI 7/27/2018 reveals severe left foraminal stenosis due to disc osteophyte complex at C6-7 impinging left C7 nerve root.  Moderate right foraminal stenosis and moderate central canal stenosis.  C4-5 moderate right foraminal stenosis and moderate central canal stenosis.  C5-6 mild right foraminal stenosis.          -INTERVENTIONS: No further recommendation for injection at this time.    -Referral: Referral sent to [facility] neurosurgery to obtain surgical opinion at this point for her ongoing chronic left cervical radiculitis with chronic left cervical nerve root compression  C6-7.    -MEDICATIONS: Did discuss again possibility of a low-dose gabapentin 100 mg 1 tablet 3 times daily however she prefers to hold off on medication which is understanding.  Discussed side effects of medications and proper use. Patient verbalized understanding.    -PHYSICAL THERAPY: Did advise patient to trial further physical therapy however whether or not she moves forward with surgery will be beneficial.    Discussed the importance of core strengthening, ROM, stretching exercises with the patient and how each of these entities is important in decreasing pain.  Explained to the patient that the purpose of physical therapy is to teach the patient a home exercise program.  These exercises need to be performed every day in order to decrease pain and prevent future occurrences of pain.        -PATIENT EDUCATION: 20 minutes of total visit time was spent face to face with the patient today, 60 % of the visit was spent on counseling, education, and coordinating care.   -5 minutes spent outside of visit time, non-face-to-face time, reviewing chart.    -FOLLOW UP: Follow-up as needed if symptoms worsen pending surgical opinion.  Advised to contact clinic if symptoms worsen or change.    Subjective:     Ellie Echols is a 42 y.o. female who presents today for follow-up regarding 2 weeks post left C6-7 transforaminal epidural steroid injection which she did receive relief for the first couple of hours with her pain however the numbness and tingling appeared to be slightly worse post injection, however currently she is back to baseline with both the pain and numbness and tingling, symptoms are a 4 at its best but up to a 6 at its worst.  Driving, working while sitting at the desk, sitting for long periods of time in general aggravates her arm pain.    Patient denies right arm symptoms, denies upper extremity weakness bilaterally, denies balance changes, denies recent trips or falls, denies bowel or bladder  dysfunction. No myelopathic symptoms.      -Treatment to Date: No prior spinal surgery or spinal injection.  Chiropractic manipulation and acupuncture 2 years ago with short-term relief only.  Physical therapy ×2 sessions [facility] 2/20/2018 cervical radiculitis.      -Medications:  Tylenol/Aleve over-the-counter with minimal relief.  Not currently taking.  Flexeril 5 mg 1-2 tablets at bedtime as needed.    Patient Active Problem List   Diagnosis     Lower Back Pain     Nephrolithiasis     Hypertension     Migraine Headache     Internal Hemorrhoids     Chronic Major Depression     Esophageal reflux     Urinary calculus, unspecified     Lower abdominal pain       Current Outpatient Prescriptions on File Prior to Encounter   Medication Sig Dispense Refill     citalopram (CELEXA) 20 MG tablet TAKE 1 TABLET BY MOUTH EVERY DAY 90 tablet 2     cyclobenzaprine (FLEXERIL) 5 MG tablet TAKE 1-2 TABLETS BY MOUTH AT BEDTIME AS NEEDED FOR MUSCLE SPASMS. 30 tablet 0     dicyclomine (BENTYL) 20 mg tablet Take 20 mg by mouth as needed.       EPINEPHrine (AUVI-Q) 0.3 mg/0.3 mL injection Inject into thigh for allergic reaction 2 Pre-filled Pen Syringe 0     L. ACIDOPHILUS/BIFID. ANIMALIS (DAILY PROBIOTIC ORAL) Take 1 tablet by mouth daily.       losartan-hydrochlorothiazide (HYZAAR) 100-25 mg per tablet TAKE 1 TABLET BY MOUTH DAILY. 90 tablet 5     MULTIVIT-MINERALS/FERROUS FUM (MULTI VITAMIN ORAL) Take 1 tablet by mouth daily.       OMEGA-3/DHA/EPA/FISH OIL (FISH OIL-OMEGA-3 FATTY ACIDS) 300-1,000 mg capsule Take 2 g by mouth daily. Plant base omega       No current facility-administered medications on file prior to encounter.        Allergies   Allergen Reactions     [place]      Minocycline Hives     [place]      Compazine [Prochlorperazine] Anxiety       Past Medical History:   Diagnosis Date     Asthma      Bladder infection      Chronic back pain      GERD (gastroesophageal reflux disease)      Hypertension      Kidney  stone      Migraine      Painful swelling of joint         Review of Systems  ROS: Positive for numbness and tingling and headache.  Specifically negative for bowel/bladder dysfunction, balance changes, dizziness, foot drop, fevers, chills, appetite changes, nausea/vomiting, unexplained weight loss. Otherwise 13 systems reviewed are negative. Please see the patient's intake questionnaire from today for details.    Reviewed Social, Family, Past Medical and Past Surgical history with patient, no significant changes noted since prior visit.     Objective:     BP (!) 137/95 (Patient Site: Left Arm, Patient Position: Sitting)  Pulse 100  Wt 154 lb (69.9 kg)  SpO2 99%  BMI 27.72 kg/m2    PHYSICAL EXAMINATION:   --CONSTITUTIONAL: Vital signs as above. No acute distress. The patient is well nourished and well groomed.  --PSYCHIATRIC:  The patient is awake, alert, oriented to person, place, time and answering questions appropriately with clear speech. Appropriate mood and affect   --HEENT: Sclera are non-injected. Extraocular muscles are intact.   --SKIN: Skin over the face, bilateral upper extremities, and posterior torso is clean, dry, intact without rashes.  --RESPIRATORY: Normal rhythm and effort. No abnormal accessory muscle breathing patterns noted.   --GROSS MOTOR: Easily arises from a seated position.   --CERVICAL SPINE: Inspection reveals no evidence of deformity, slight elevation of right trapezius on exam. Range of motion is not limited in cervical flexion, extension, lateral rotation. No tenderness to palpation cervical spine.    --SHOULDERS: Full range of motion bilaterally. Negative empty can.  --UPPER EXTREMITY MOTOR TESTING:  Wrist flexion left 5/5, right 5/5  Wrist extension left 5/5, right 5/5  Pronators left 5/5, right 5/5  Biceps left 5/5, right 5/5   Triceps left 5/5, right 5/5   Shoulder abduction left 5/5, right 5/5   left 5/5, right 5/5  --NEUROLOGIC: CN III-XII are grossly intact. 2/4  symmetric biceps, brachioradialis, triceps reflexes bilaterally. Sensation to upper extremities is intact. Negative Montoya's bilaterally.   --VASCULAR: Warm upper limbs bilaterally.     Imaging of the cervical spine: Cervical spine imaging was reviewed today. The images were shown to the patient and the findings were explained using a spine model.      Mr Cervical Spine Without Contrast  Result Date: 2/28/2018  INDICATION: Chronic neck pain and left C6-C7 radiculitis. TECHNIQUE: Unenhanced. COMPARISON: None.   FINDINGS: C2-C3: Normal disc height. Facet joints negative. No central canal stenosis. No foraminal stenosis. C3-C4: Normal disc height. Mild posterolateral spondylotic ridging and left worse than right uncinate spurring.  Minimal annular bulge. No focal disc herniation. Facet joints negative. Mild central canal stenosis. Mild bilateral foraminal stenosis, left worse than right. C4-C5: Normal disc height. Mild spondylotic ridging and uncinate spurring eccentric right. Small shallow central disc protrusion, and right posterolateral disc osteophyte complex. Facet joints negative. Moderate central canal stenosis, AP thecal sac diameter 7 mm. Moderate right foraminal stenosis. No left foraminal stenosis. C5-C6: Normal disc height. Mild spondylotic ridging. Annular bulge. Facet joints negative. Mild central canal stenosis. Mild right foraminal stenosis. No left foraminal stenosis. C6-C7: Mild disc space loss. Spondylotic ridging and uncinate hypertrophy eccentric left. Annular bulge. Shallow left posterolateral disc osteophyte complex. Moderate central canal stenosis, AP thecal sac diameter 7 mm. Severe left foraminal stenosis. Moderate right foraminal stenosis. C7-T1: Normal disc height. Slight annular bulge. Facet joints negative. No central canal stenosis. No foraminal stenosis. Craniocervical junction negative. Cervical cord negative. No significant marrow signal abnormality. No significant paravertebral soft  tissue abnormality. There is mild central canal stenosis at T3-T4 secondary to annular bulging and ligamentum flavum thickening or calcification.   CONCLUSION:   1.  Degenerative changes and spondylosis in the cervical spine as described above.   2.  At C6-C7, there is severe left foraminal stenosis secondary to disc osteophyte complex with likely impingement on the exiting left C7 nerve roots. There is moderate right foraminal stenosis and moderate central canal stenosis at this level.   3.  At C4-C5, there is moderate right foraminal stenosis and moderate central canal stenosis.   4.  At C5-C6, there is mild right foraminal stenosis and mild central canal stenosis.

## 2021-06-17 NOTE — PROGRESS NOTES
NEUROSURGERY CONSULTATION NOTE:    Assessment:   Cord compression myelopathy  Cervical nerve root compression related to left C6-7 herniated disc    Plan: I have recommended an open left laminoplasty.  I have also recommended a left C6-7 microdiscectomy.  The laminoplasty would be from C3 through C7.  I explained the risk of infection, bleeding and neurological deficit.  She has very reasonable questions and I believe I answered to her satisfaction.  I will go ahead and submit the orders.  Time spent in evaluation, personal interpretation of imaging, documentation and face-to-face discussion was 45 minutes.    Ellie Echols   [address]  [facility] [address]  42 y.o. female is sent to me in consultation   by Liss Cornell MD     CC:    Chief Complaint   Patient presents with     Arm Pain     LUE       HPI:  Neurosurgery consultation was requested by: Paige Nava CNP  Pain: Denies neck pain   Radicular Pain is present: Pain LUE  Lhermitte sign: No  Motor complaints: Denies weakness  Sensory complaints: Numbness and tingling in left arm and into thumb and 2nd and 3rd finger on left hand   Gait and balance issues: Denies   Bowel or bladder issues: Denies   Duration of SX is: 3 years, but progressed in last 6 months   The symptoms are worse with: everything   The symptoms are better with: sitting in recliner with legs up   Injury: Denies   Severity is: Moderate  Patient has tried the following conservative measures: Pt has done PT and made symptoms worse. She had a Left C6-7 and helped pain but made numbness worse and only lasted one day.     PROBLEM LIST:  Cord compression myelopathy  Cervical nerve root compression    REVIEW OF SYSTEMS:  A 12 point review of systems is negative other than those symptoms listed above.      Past Medical History:   Diagnosis Date     Asthma      Bladder infection      Chronic back pain      GERD (gastroesophageal reflux disease)      Hypertension      Kidney stone       Migraine      Painful swelling of joint          Past Surgical History:   Procedure Laterality Date     CARPAL TUNNEL RELEASE Right       SECTION      five     DC CYSTOURETHROSCOPY      Description: Cystoscopy (For Therapy);  Proc Date: 06/15/2012;  Comments: Performed at [facility]     TUBAL LIGATION       URETEROSCOPY           MEDICATIONS:  Current Outpatient Prescriptions   Medication Sig Dispense Refill     citalopram (CELEXA) 20 MG tablet TAKE 1 TABLET BY MOUTH EVERY DAY 90 tablet 2     cyclobenzaprine (FLEXERIL) 5 MG tablet TAKE 1-2 TABLETS BY MOUTH AT BEDTIME AS NEEDED FOR MUSCLE SPASMS. 30 tablet 0     dicyclomine (BENTYL) 20 mg tablet Take 20 mg by mouth as needed.       L. ACIDOPHILUS/BIFID. ANIMALIS (DAILY PROBIOTIC ORAL) Take 1 tablet by mouth daily.       losartan-hydrochlorothiazide (HYZAAR) 100-25 mg per tablet TAKE 1 TABLET BY MOUTH DAILY. 90 tablet 5     MULTIVIT-MINERALS/FERROUS FUM (MULTI VITAMIN ORAL) Take 1 tablet by mouth daily.       OMEGA-3/DHA/EPA/FISH OIL (FISH OIL-OMEGA-3 FATTY ACIDS) 300-1,000 mg capsule Take 2 g by mouth daily. Plant base omega       EPINEPHrine (AUVI-Q) 0.3 mg/0.3 mL injection Inject into thigh for allergic reaction 2 Pre-filled Pen Syringe 0     No current facility-administered medications for this visit.          ALLERGIES/SENSITIVITIES:     Allergies   Allergen Reactions     [place]      Minocycline Hives     [place]      Compazine [Prochlorperazine] Anxiety       PERTINENT SOCIAL HISTORY:   Social History     Social History     Marital status:      Spouse name: N/A     Number of children: N/A     Years of education: N/A     Social History Main Topics     Smoking status: Former Smoker     Packs/day: 0.25     Years: 1.00     Smokeless tobacco: Never Used     Alcohol use Yes      Comment: rare     Drug use: No     Sexual activity: Not Asked     Other Topics Concern     None     Social History Narrative    She is a  and works at [employer].   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         FAMILY HISTORY:  Family History   Problem Relation Age of Onset     Adopted: Yes     Family history unknown: Yes        PHYSICAL EXAM:   Constitutional: BP (!) 159/101  Pulse (!) 102  Ht 5' 2.5\" (1.588 m)  Wt 154 lb (69.9 kg)  SpO2 98%  BMI 27.72 kg/m2    General appearance: Appropriately groomed.  No acute distress.  Interactive.     Mental Status: Mental status: Alert and oriented, mood and affect appropriate, language reception and expression normal, recent and remote memory is normal, higher cortical function normal. Attention span, concentration and ability to follow commands is normal.       Cranial Nerves: Face is symmetric.  Extraocular movements are full, conjugate and without nystagmus.  Hearing is preserved.  Shoulder position is symmetric.  Tongue is midline with normal motion.  Palate elevates symmetrically.     Motor: Mild weakness noted of the left triceps.  Tone nl, bulk nl and strength 5/5 all other groups.      Sensory: Sensory exam by subjective report intact to LT,PP,Position and Vib. in the UE and  LE, with the exception of altered sensation in the distal left C7.     Station and Gait:  Station and Gait- nl stride length,  balance and minh..     Reflexes; hyperactive spreading reflexes.    IMAGING:  I have personally reviewed the images and discussed the findings with Ellie Echols.  She has a very stenotic canal at C6-7 with an AP diameter of 7 mm.  She has mild central stenosis at C5-6 and she has marked central stenosis at C4-5.  She has some mild central stenosis at C3-4.  She has a herniated disc to the left at C6-7.  There is severe left foraminal stenosis due to the disc osteophyte complex.    CC:     Kristin Nava, [id number] [phone]     "

## 2021-06-17 NOTE — PROGRESS NOTES
[facility] Follow Up Note    Ellie Echols   42 y.o. female    Date of Visit: 4/18/2018    Chief Complaint   Patient presents with     Fmla Paperwork     cervical stenosis, nerve pain in left arm     Subjective  Ellie is in today to discuss her cervical pain and cord compression myelopathy.  She originally had neck pain about 3 years ago that would come and go but over the last few month s it has become progressive to the point now where she cannot drive due to elevating her arm or working with her arm elevated or repetitive head or arm movements she gets a severe burning pain that is incapacitating.  Evaluated first at the spine center after she had visited a chiropractor with failed results and MRI scan demonstrated severe left foraminal stenosis due to disc osteophyte complex at C6-7 impinging the left C7 nerve root.  She also has moderate multilevel foraminal stenosis and moderate central Canal stenosis at several levels.  She did not receive any improvement after mild initial relief with an BELL.  She has been evaluated by neurosurgery and is now scheduled to have an open door laminoplasty from C3-C7 and left C6-7 microdiscectomy by Dr. Yu Schofield she is waiting for insurance approval.  She has not been able to drive or work well without severe pain.  She is unable to sleep well.  The only time she is not in pain as if she sits in a certain position on a certain couch within her home.  She would like some pain medications and needs to be off of work until surgery is approved and completed and she has performed rehabilitation.  Blood pressures been stable when checked outside of the clinic.    ROS A comprehensive review of systems was performed and was otherwise negative.    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       Medications were  reconciled.  Allergies, social and family history, and the problem list were all reviewed and updated.      Exam  General Appearance: Pleasant and alert   Vitals:    04/18/18 1338 04/18/18 1341 04/18/18 1348   BP: (!) 146/94 (!) 138/94 128/80   Patient Site: Left Arm     Patient Position: Sitting     Cuff Size: Adult Regular     Pulse: 90     SpO2: 99%     Weight: 156 lb 1 oz (70.8 kg)        Body mass index is 28.09 kg/(m^2).  Wt Readings from Last 3 Encounters:   04/18/18 156 lb 1 oz (70.8 kg)   04/04/18 154 lb (69.9 kg)   03/29/18 154 lb (69.9 kg)     HEENT: Sclera are clear.   Lungs: Normal respirations  Cardiac: Regular rate and rhythm   Abdomen: Soft and nondistended  Extremities: No edema  Skin: No rashes  Neuro: Moves all extremities and has facial symmetry  Gait: Ambulates with a normal gait    Assessment/Plan  1. Cervical cord compression with myelopathy  We will write a note seen she should be out of work until her surgery is performed and she has recovered to neurosurgery's satisfaction.  We will start her on gabapentin 100 mg 3 times daily which she can gradually increase up to 300 mg 3 times daily.  To give her some Percocet which she has tolerated before after surgeries to use if severe pain.    2. Foraminal stenosis of cervical region  As above.    3. Hypertension  Blood pressure is fairly stable at home, will monitor.          Liss Cornell MD  Internal and Geriatric Medicine  [facility]    Much or all of the text in this note was generated through the use of Dragon Dictate voice-to-text software. Errors in spelling or words which seem out of context are unintentional. Sound alike errors, in particular, may have escaped editing.

## 2021-06-18 NOTE — PROGRESS NOTES
Has just a  few Flexeril at home.  One Valium. Also has Lidoderm patches.      Refill Oxycodone 5 mg po TID, Flexeril 5 mg BID.

## 2021-06-18 NOTE — PROGRESS NOTES
Ellie Echols is status post open door laminoplasty C3-C7 left on 5/7/2018 by Dr. Yu Schofield.  Preoperatively presented with complaints of progressive myelopathy.  Today she returns in follow up for staples removal. She is doing reasonably well  - her neck pain is minimal and well controlled with Tylenol and ice packs. Denies radicular pain, sensory or motor issues in all four extremity. Gait and balance are normal. Wears a [place] J collar.        Surgical wound WNL - CDI, no signs of infection or skin breakdown.  Incision well-healed: good skin approximation, no redness or visible/palpable edema, no tenderness to palpation.  PT. AF, denies fever, chills or sweats.  Pt. reports that the symptoms are improved from pre-op.    Staples - intact removed without difficulty. Wound prepped with Betadine before and after removal.  Surrounding skin has no signs of breakdown.  Verbal instructions regarding incision care are given.  Pt. advised to call us if any s/s of infection noted - all discussed in details.

## 2021-06-18 NOTE — PROGRESS NOTES
[facility]   Cervical Thoracic Initial Evaluation    Patient Name: Ellie Echols  Date of evaluation: 6/19/2018  Referral Diagnosis: Radiculitis of left cervical region  Referring provider: Luda Miller CNP  Visit Diagnosis:     ICD-10-CM    1. Chronic neck pain M54.2     G89.29    2. Cervical nerve root compression G54.2    3. Neck muscle weakness M53.82    4. Abnormal posture R29.3        Assessment:      Pt. is appropriate for skilled PT intervention as outlined in the Plan of Care (POC).    Goals:  Pt. will demonstrate/verbalize independence in self-management of condition in : 6 weeks  Pt. will improve posture : and demonstrate posture with minimal to no cuing;and maintain posture for;30 minutes;for working;in 6 weeks  Patient Turn Head: for conversation;for computer;for work;with less pain;with less difficulty;in 6 weeks  Patient will look up / down: for other;for computer work;with less pain;with less difficulty;in 6 weeks  other activity: cleaning and other ADL's   Patient will decrease : NDI score;for improved quality of function;in 6 weeks  Pt will: be able to perform work tasks and other ADL's with increased strength and pain <2/10; in 6 weeks     Patient's expectations/goals are realistic.    Barriers to Learning or Achieving Goals:  No Barriers.       Plan / Patient Instructions:        Plan of Care:   Communication with: Referral Source;Patient Caregiver  Patient Related Instruction: Nature of Condition;Treatment plan and rationale;Self Care instruction;Basis of treatment;Body mechanics;Posture;Precautions;Expected outcome  Times per Week: 2  Number of Weeks: 6  Number of Visits: 12 - PRN   Discharge Planning: Pt will be discharged upon meeting goals or plateau of progress   Therapeutic Exercise: ROM;Stretching;Strengthening  Neuromuscular Reeducation: kinesio tape;posture;core  Manual Therapy: soft tissue mobilization;myofascial release;joint mobilization;muscle energy;strain  counterstrain  Modalities: electrical stimulation;TENS;cold pack;hot pack    Plan for next visit: Focus on cervical, postural and UE strength and awareness and add MT PRN      Subjective:         History of Present Illness:    Ellie is a 42 y.o. female who presents to therapy today with complaints of neck pain and weakness after surgery. Date of onset/duration of symptoms is post-operative on 5/7/18. Onset was post-operative from status post C4-C7 open left laminoplasty by Dr. Schofield. Symptoms are getting better. She reports  a constant  history of similar symptoms. She describes their previous level of function as not limited    Prior to having surgery she had 3 years of left arm numbness etc and then the last 8 months before surgery the sx got worse.    Pt reports that tasks that involve looking down at all cause increased pain.    Pt reports that she is able to run on the treadmill and do tasks looking forward with no issue.    Pt reports that she is now able to add 5# to her lifting each week.    Pt reports that she has been out of the neck brace for a week and she does feel the fatigue.  When the sx really get sore then she will lay down.     Pt reports that most of her left arm sx are gone with just very minimal sx in the AM and they are gone once she gets up.        Pt has not gone back to work and she returns on 7/2/18.  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      Pt reports that she does not have any other follow-up with the MD.      Pain Rating:3  Pain rating at best: 0  Pain rating at worst: 4  Pain description: aching, burning and pain    Functional limitations are described as occurring with:   looking down and looking at computer  performing routine daily activities  getting legs in pants, cleaning, etc     Patient reports benefit from:  heat, cold   No pain medications          Objective:      Note: Items left blank indicates the item was not performed or  not indicated at the time of the evaluation.    Patient Outcome Measures :    Neck Disability Score in %: 34   Scores range from 0-100%, where a score of 0% represents minimal pain and maximal function. The minmal clinically important difference is a score reduction of 10%.    Cervical Thoracic Examination  1. Chronic neck pain     2. Cervical nerve root compression     3. Neck muscle weakness     4. Abnormal posture       Involved side: Left and Bilateral  Posture Observation:      General sitting posture is  fair.    Cervical ROM:    Date: 6/19/18     *Indicate scale AROM AROM AROM   Cervical Flexion No loss, tightness      Cervical Extension Min loss (12 cm), pain       Right Left Right Left Right Left   Cervical Sidebending Min loss (11 cm), no pain  Min loss (11 cm), no pain        Cervical Rotation No-min loss (10 cm), stiff No-min loss (10 cm), stiff       Cervical Protraction Min loss, min pain      Cervical Retraction Min loss, min pain        Strength     Date: 6/19/18     Cervical Myotomes/5 Right Left Right Left Right Left   Cervical Flex/Ext (C1-2) 4 4       Cervical Sidebending (C3) 4 4       Shoulder Elevation (C4) 4+ 4+       Shoulder Abduction (C5) 4+ 4+       Elbow Flexion (C6) 5 5       Elbow Extension (C7) 5 5       Wrist Flexion (C7) 5 5       Wrist Extension (C6) 5 5       Thumb abduction (C8) 5 5       Finger Abduction (T1) 5 5         Sensation: WNL        Reflex Testing: NT    Palpation:  Increased cervical paraspinal tightness and tenderness = moderate   Increased UT tightness and tenderness = moderate L  Increased R scalene tightness and tenderness = moderate     Passive Mobility-Joint Integrity: WNL.   Tenderness over the cervical vertebrae     Cervical Special Tests     Cervical Special Tests Right Left UE Nerve Mobility Right Left   Cervical compression - - Median nerve - -   Cervical distraction - - Ulnar nerve - -   Spurling s test - - Radial nerve - -   Shoulder abduction sign    Thoracic outlet     Deep neck flexor endurance test   Renu     Upper cervical rotation   Adson s     Sharper-Marques   Cervical rotation lateral flexion     Alar ligament test   Other:     Other:   Other:       UE Screen: Bilateral UT overuse       Treatment Today     TREATMENT MINUTES COMMENTS   Evaluation 24    Self-care/ Home management     Manual therapy     Neuromuscular Re-education     Therapeutic Activity     Therapeutic Exercises 30 Pathology, POC, HEP, posture, etc  See flow sheet    Gait training     Modality__________________                Total 54    Blank areas are intentional and mean the treatment did not include these items.       PT Evaluation Code: (Please list factors)  Patient History/Comorbidities: HTN   Examination: pain with CROM, neck and posture weakness, cervical muscle tightness, abnormal posture   Clinical Presentation: stable and uncomplicated   Clinical Decision Making: low     Patient History/  Comorbidities Examination  (body structures and functions, activity limitations, and/or participation restrictions) Clinical Presentation Clinical Decision Making (Complexity)   No documented Comorbidities or personal factors 1-2 Elements Stable and/or uncomplicated Low   1-2 documented comorbidities or personal factor 3 Elements Evolving clinical presentation with changing characteristics Moderate   3-4 documented comorbidities or personal factors 4 or more Unstable and unpredictable High            Nora Willis  6/19/2018  12:29 PM

## 2021-06-18 NOTE — PROGRESS NOTES
[facility] Clinic Follow Up Note    Ellie Echols   42 y.o. female    Date of Visit: 5/22/2018    Chief Complaint   Patient presents with     Hospital Visit Follow Up     [facility] 5/9/18     Subjective  Ellie comes in today after having surgery with cervical laminoplasty on May 7.  She was discharged home on May 9.  Her pain is so much improved.  She is still in a collar.  She had the staples removed a couple days ago.  She is doing very well.  Her blood pressures been well controlled.  She has no new concerns or complaints.    ROS A comprehensive review of systems was performed and was otherwise negative.    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       Medications were reconciled.  Allergies, social and family history, and the problem list were all reviewed and updated.    Old records reviewed: Postsurgical records    Exam  General Appearance: Pleasant and alert   Vitals:    05/22/18 1152   BP: 122/82   Patient Site: Left Arm   Patient Position: Sitting   Cuff Size: Adult Regular   Pulse: 86   SpO2: 98%   Weight: 145 lb 9 oz (66 kg)      Body mass index is 25.79 kg/(m^2).  Wt Readings from Last 3 Encounters:   05/22/18 145 lb 9 oz (66 kg)   05/07/18 141 lb 11.2 oz (64.3 kg)   04/25/18 154 lb 9 oz (70.1 kg)     HEENT: Sclera are clear.   Lungs: Normal respirations  Abdomen: Soft and nondistended  Extremities: No edema  Skin: No rashes  Neuro: Moves all extremities and has facial symmetry  Gait: Ambulates with a normal gait    Assessment/Plan  1. Foraminal stenosis of cervical region  Doing very well after surgery.  She is slowly weaning herself off of the narcotics.  She will follow-up with neurosurgery as planned.    2. Hypertension  Stable on medication.          Liss Cornell MD  Internal and Geriatric Medicine  [facility]  [facility]    Much or all of the text in this note was generated through the use of Dragon Dictate voice-to-text software. Errors in spelling or words which seem out of context are unintentional. Sound alike errors, in particular, may have escaped editing.

## 2021-06-18 NOTE — PROGRESS NOTES
"CHART NOTE     DATE OF SERVICE:  2018     : 1975   42 y.o.     ASSESSMENT :   Doing well with improvement in symptoms and function.       PLAN: Wean from collar/brace. Loosen restrictions. Refer to PT. RTC prn. Enc to call with any new questions or concerns.     HPI:  Ellie Echols is status post CERVICAL 4 - CERVICAL 7 OPEN LEFT LAMINOPLASTY on 18 by Dr. Schofield. Presented with neck pain but mostly left arm pain all the way to the finger.     TODAY, Ellie Echols reports neck pain and arm pain is gone.  No N/T in extremities.  Walking with good tolerance. Off from job as .      PAST MEDICAL HISTORY, SURGICAL HISTORY, REVIEW OF SYMPTOMS, MEDICATIONS AND ALLERGIES:  Past medical history, surgical history, ROS, medications and allergies reviewed with patient and remain unchanged from previous visit.    Past Medical History:   Diagnosis Date     Asthma      Bladder infection      Chronic back pain      Depression      Foraminal stenosis of cervical region      GERD (gastroesophageal reflux disease)      Hypertension      Internal hemorrhoids      Kidney stone      Migraine      Painful swelling of joint        PHYSICAL EXAM:    BP (!) 133/92  Pulse 79  Ht 5' 3\" (1.6 m)  Wt 145 lb (65.8 kg)  SpO2 99%  BMI 25.69 kg/m2      Neurological exam reveals:  Respirations easy, non-labored.   Skin: W/D/I. No rashes, lesions or breaks in integrity.   Recent and remote memory intact, fund of knowledge wnl.    Alert and oriented x3, speech fluent and appropriate.    PERRL, EOMI, No nystagmus,   Face symmetric, tongue midline, Uvula midline,  palate rises with phonation   Shoulder shrug equal  Arm strength bilateral grasp, biceps, triceps, and deltoids 5/5   No extremity edema noted.   Muscle Bulk and tone wnl.   Reflexes: No pathological reflexes   Gait and station:Normal  Incision: CDI without erythema or edema  NDI/LAMAR:      RADIOGRAPHIC IMAGING: XR: Laminoplasty hardware intact without " evidence of loosening.   Films personally reviewed and interpreted.   Reviewed imaging with patient and family.

## 2021-06-18 NOTE — PROGRESS NOTES
Pt is here for a wound check s/p CERVICAL 4 - CERVICAL 7 OPEN LEFT LAMINOPLASTY on 5-7-18 by Dr. Schofield.   Before surgery, she reports she had some neck pain but mostly left arm pain all the way to the finger.  Today, she reports left arm pain is significantly better with occasional twinge of discomfort. She has some tingling in the left arm. Her head is tilting to the left a little and she thinks she is holding her head this way because of pain. Reports much shoulder and para incisional pain taking oxycodone about 3 times per day and valium about twice daily.  She is taking the ES tylenol and only icing once per day.  She is requesting refill of pain medications and NP will address. She has about 10 flexeril left from prior to surgery.    Surgical wound WNL, staples intact - CDI, no signs of infection or skin breakdown.  Incision well-healed: good skin approximation, no redness or visible/palpable edema, no tenderness to palpation.  PT. AF, denies fever, chills or sweats.  Pt. reports that the symptoms are improved from pre-op.    Jenise Flores RN

## 2021-06-19 NOTE — PROGRESS NOTES
SHe c/o burning and numbness in left shoulder and lateral side of left arm into pinky finger. Worse form elbow down. Started about 6 weeks after therapy and has been getting worse.   She also has pain in the medial scapular region about where C7 would radiate.  The EMG does show some change in the left triceps.  I will send her for a diagnostic and therapeutic injection of C6-7 to see if this is the source of her ongoing symptoms.  She should return about 2 weeks after that injection for further discussion.  Time spent in documentation, interpretation of imaging information and face-to-face discussion was 10 minutes.

## 2021-06-19 NOTE — LETTER
Letter by Zuleyka Spain at      Author: Zuleyka Spain Service: -- Author Type: --    Filed:  Encounter Date: 10/11/2019 Status: Signed         October 11, 2019       Ellie Echols  [address] [place] [address]    Dear Ellie Echols:    We are pleased to provide you with secure, online access to medical information for you and your family within [employer]. Per your request, we have expanded your account to allow access to the records of the following family members:                Gene MAYO Onesimo (privilege ends on 10/11/2020.)     How Do I Log In?  1. In your Internet browser, go to [email or web address]  2. Log into [employer]rot using your avocarrot Username and Password.  3. Click Sign In.        How Do I Access a Family Member's Account?  4. Select the account you want to access by clicking the [place] with the appropriate patient's name at the top of your screen.   5. You will see a disclaimer page letting you know that you will be viewing a family member's record. Review the disclaimer and then click Accept Proxy Access Disclaimer to proceed.  6. Once you switch to viewing a family member's record, you can navigate to avocarrot pages the same way you would for yourself. You can return to your own account by clicking the [place] at the top of the screen with your name on it.    7. To customize the colors and names of the linked accounts, you can select Personalize from the Profile dropdown menu at the top of the screen, then click the Edit button to make changes.     Additional Information  If you have questions, visit [email or web address], e-mail [email or web address] or call [phone] to talk to our avocarrot staff. Remember, avocarrot is NOT to be used for urgent needs. For medical emergencies, dial 911.

## 2021-06-19 NOTE — PROGRESS NOTES
[facility] Daily Progress     Patient Name: Ellie Echols  Date: 2018  Visit #: 6  PTA visit #:  3  Referral Diagnosis: status post CERVICAL 4 - CERVICAL 7 OPEN LEFT LAMINOPLASTY on 18 by Dr. Schofield.  Referring provider: Luda Miller, CNP  Visit Diagnosis:     ICD-10-CM    1. Chronic neck pain M54.2     G89.29    2. Cervical nerve root compression G54.2    3. Neck muscle weakness M53.82    4. Abnormal posture R29.3    5. Essential hypertension I10          Assessment:   Pt with increased L UE symptoms recently.    Pt progress limited by inconsistent HEP.  PT revised HEP to focus on certain exercises and increased compliance.     Patient is benefitting from skilled physical therapy and is making steady progress toward functional goals.  Patient is appropriate to continue with skilled physical therapy intervention, as indicated by initial plan of care.    Goal Status:   Pt. will demonstrate/verbalize independence in self-management of condition in : 6 weeks  Pt. will improve posture : and demonstrate posture with minimal to no cuing;and maintain posture for;30 minutes;for working;in 6 weeks  Patient Turn Head: for conversation;for computer;for work;with less pain;with less difficulty;in 6 weeks  Patient will look up / down: for other;for computer work;with less pain;with less difficulty;in 6 weeks  other activity: cleaning and other ADL's   Patient will decrease : NDI score;for improved quality of function;in 6 weeks  Pt will: be able to perform work tasks and other ADL's with increased strength and pain <2/10; in 6 weeks     Plan / Patient Education:     Continue with initial plan of care.  Progress with home program as tolerated.   Progress exercises per pt's tolerance.  Pt encouraged to do her exercises more often. She was instructed to do the band exercises every other day.    Subjective:     Pain ratin-1/10 L UE     Pt reports that overall things are going pretty well.   "She notes that her neck is sore from being ill over the weekend.  She is back to work and that is going well and she is having less intense pain with sitting now and it more soreness rather than pain.      The things that still trigger or increase her pain:  Sitting unsupported or looking down for any amount of time.      Pt's original sx are resolved, but she is now feeling left arm sx in fingers 4 and 5.  This is most present in the AM. Her positions during the day will also trigger this.      Pt is to have an EMG on 8/3    Pt reports that she is somewhat inconsistent with her HEP.      Objective:     Pt tolerates the exercises well with moderate cueing for technique and consistency.     Cervical ROM: 7/10/18  Flexion: WNL  Extension: WNL \"sore\"  Side bending: moderate loss B \"stiff\"  Rotation: WNL    Cervical MMT (measured 7/26/18):   Flexion = 4/5  Extension = 4-/5  R SB = 4/5   Pt's strength continues to be addressed by her HEP.      Palpation:  Pt with continued tightness and tenderness over:  Bilateral cervical paraspinals = minimal - moderate   B UT's = minimal - moderate     Revises HEP Exercises:  Exercise #1: UBE  Comment #1: x3'  Exercise #2: Neck A-Z  Comment #2: x1  Exercise #3: Cervical Isometrics - flex, B SB, supine ext   Comment #3: ext x10 with 10\" hold   Exercise #4: scap retract   Comment #4: reviewed   Exercise #5: nerve glide - face and push away   Comment #5: L x10   Exercise #6: Quadruped - arm raises Bx10   Comment #6: Wall push-up Bx15   Exercise #7: shoulder extension L 2 band   Comment #7: Bx20 - orange   Exercise #8: B shoulder ER L2 band   Comment #8: hold for now     Treatment Today     TREATMENT MINUTES COMMENTS   Evaluation     Self-care/ Home management     Manual therapy 12 MFR/STM to cervical paraspinals L>R, suboccipitals and L upper trap and levator.   Neuromuscular Re-education     Therapeutic Activity     Therapeutic Exercises 18 See flow sheet  No new exercises were added " today   Gait training     Modality__________________                Total 30    Blank areas are intentional and mean the treatment did not include these items.       Nora Willis, PT  7/31/2018

## 2021-06-19 NOTE — LETTER
Letter by Zuleyka Spain at      Author: Zuleyka Spain Service: -- Author Type: --    Filed:  Encounter Date: 10/11/2019 Status: Signed         October 11, 2019       Ellie MARIA ESTHER Echols  [address] [place] [address]    Dear Ellie Echols:    We are pleased to provide you with secure, online access to medical information for you and your family within [employer]. Per your request, we have expanded your account to allow access to the records of the following family members:                          Neelima KAUR Onesimo (privilege ends on 10/11/2020.)     How Do I Log In?  1. In your Internet browser, go to [email or web address]  2. Log into [employer] using your [employer] Username and Password.  3. Click Sign In.        How Do I Access a Family Member's Account?  4. Select the account you want to access by clicking the [place] with the appropriate patient's name at the top of your screen.   5. You will see a disclaimer page letting you know that you will be viewing a family member's record. Review the disclaimer and then click Accept Proxy Access Disclaimer to proceed.  6. Once you switch to viewing a family member's record, you can navigate to [employer] pages the same way you would for yourself. You can return to your own account by clicking the [place] at the top of the screen with your name on it.    7. To customize the colors and names of the linked accounts, you can select Personalize from the Profile dropdown menu at the top of the screen, then click the Edit button to make changes.     Additional Information  If you have questions, visit [email or web address], e-mail [email or web address] or call [phone] to talk to our [employer] staff. Remember, [employer] is NOT to be used for urgent needs. For medical emergencies, dial 911.

## 2021-06-19 NOTE — PROGRESS NOTES
" [facility] Daily Progress     Patient Name: Ellie Echols  Date: 7/10/2018  Visit #: 2  PTA visit #:  1  Referral Diagnosis: [unfilled]  Referring provider: Luda Miller CNP  Visit Diagnosis:     ICD-10-CM    1. Chronic neck pain M54.2     G89.29    2. Cervical nerve root compression G54.2    3. Neck muscle weakness M53.82    4. Abnormal posture R29.3    5. Essential hypertension I10    6. Cervical radiculitis M54.12    7. Numbness and tingling in left upper extremity R20.0     R20.2    8. Poor posture R29.3          Assessment:     Patient is benefitting from skilled physical therapy and is making steady progress toward functional goals.  Patient is appropriate to continue with skilled physical therapy intervention, as indicated by initial plan of care.    Goal Status: On going  Pt. will demonstrate/verbalize independence in self-management of condition in : 6 weeks  Pt. will improve posture : and demonstrate posture with minimal to no cuing;and maintain posture for;30 minutes;for working;in 6 weeks  Patient Turn Head: for conversation;for computer;for work;with less pain;with less difficulty;in 6 weeks  Patient will look up / down: for other;for computer work;with less pain;with less difficulty;in 6 weeks  other activity: cleaning and other ADL's   Patient will decrease : NDI score;for improved quality of function;in 6 weeks  Pt will: be able to perform work tasks and other ADL's with increased strength and pain <2/10; in 6 weeks     Plan / Patient Education:     Continue with initial plan of care.  Progress with home program as tolerated.   Progress exercises per pt's tolerance.  Pt encouraged to do her exercises more often. She was instructed to do the band exercises every other day.    Subjective:     Pain Ratin  Pt states she has not done the exercises much. Pt does do some of the stretches when at work.  Pt has continued to run on the treadmill \"just about every day.\" " "Pt will run 40-60 minutes (4-5 miles)  Pt continues to have tingling in her L UE and into her \"pinky.\" Pt feels that her L UE feels \"achey.\"   Pt continues to have difficulty looking down.      Objective:     Cervical ROM: 7/10/18  Flexion: WNL  Extension: WNL \"sore\"  Side bending: moderate loss B \"stiff\"  Rotation: WNL    Exercises:  Exercise #1: UBE  Comment #1: x3'  Exercise #2: Neck A-Z  Comment #2: x1  Exercise #3: Cervical Isometrics - flex, B SB, supine ext   Comment #3: x5 with 5\" each   Exercise #4: scap retract   Comment #4: x5 with 5\"   Exercise #5: Shoulder raises to 90 - flex and ABD  Comment #5: Bx10 each   Exercise #6: rows L 2 band  Comment #6: x10  Exercise #7: shoulder extension L 2 band   Comment #7: x10  Exercise #8: B shoulder ER L2 band   Comment #8: x10  Treatment Today     TREATMENT MINUTES COMMENTS   Evaluation     Self-care/ Home management     Manual therapy     Neuromuscular Re-education     Therapeutic Activity     Therapeutic Exercises 25 See flow sheet  Added to HEP:  -UE diagonals L2 band  -nerve glides   Gait training     Modality__________________                Total 25    Blank areas are intentional and mean the treatment did not include these items.       Jinny Motta,HORACIO  7/10/2018               "

## 2021-06-19 NOTE — PROGRESS NOTES
[facility] Daily Progress     Patient Name: Ellie Echols  Date: 2018  Visit #: 7  PTA visit #:  3  Referral Diagnosis: status post CERVICAL 4 - CERVICAL 7 OPEN LEFT LAMINOPLASTY on 18 by Dr. Schofield.  Referring provider: Luda Miller, CNP  Visit Diagnosis:     ICD-10-CM    1. Chronic neck pain M54.2     G89.29    2. Cervical nerve root compression G54.2    3. Neck muscle weakness M53.82    4. Abnormal posture R29.3          Assessment:   Pt with a significant decrease in pain and tightness in her neck and left UE after MT.      Pt slowly increasing HEP compliance.     Patient is benefitting from skilled physical therapy and is making steady progress toward functional goals.  Patient is appropriate to continue with skilled physical therapy intervention, as indicated by initial plan of care.    Goal Status:   Pt. will demonstrate/verbalize independence in self-management of condition in : 6 weeks  Pt. will improve posture : and demonstrate posture with minimal to no cuing;and maintain posture for;30 minutes;for working;in 6 weeks  Patient Turn Head: for conversation;for computer;for work;with less pain;with less difficulty;in 6 weeks  Patient will look up / down: for other;for computer work;with less pain;with less difficulty;in 6 weeks  other activity: cleaning and other ADL's   Patient will decrease : NDI score;for improved quality of function;in 6 weeks  Pt will: be able to perform work tasks and other ADL's with increased strength and pain <2/10; in 6 weeks     Plan / Patient Education:     Continue with initial plan of care.  Progress with home program as tolerated.   Progress exercises per pt's tolerance.  Pt encouraged to do her exercises more often. She was instructed to do the band exercises every other day.    Subjective:     Pain ratin-1/10 L UE     Pt reports that overall things are going pretty well.  She notes that her left shoulder blade is feeling more sore  "today.  Pt attributes this soreness from doing some running outside.  Pt reports that she continues to have left UT soreness with some numbness that occurs on/off.      Pt reports that she had a nerve conduction test and they found some loss of nerve function in the triceps area/C7.  She has not spoke to Dr. Schofield regarding her results.      Pt's original sx are resolved, but she is now feeling left arm sx in fingers 4 and 5.  This is most present in the AM. Her positions during the day will also trigger this.      Pt reports that she is getting to her HEP 5 days per week.      Objective:     Pt tolerates the exercises well with moderate cueing for technique and consistency.     Cervical ROM:   Flexion: WNL  Extension: WNL  Side bending: minimal loss, min soreness in left scap with L SB  Rotation: WNL    Cervical MMT (measured 7/26/18):   Flexion = 4/5  Extension = 4-/5  R SB = 4/5   Pt's strength continues to be addressed by her HEP.      Palpation:  Pt with continued tightness and tenderness over:  Bilateral cervical paraspinals = minimal - moderate   B UT's = minimal - moderate   Left scapular = minimal - moderate   Pt notes decreased pain after MT    Posture:  Pt with a significant improvement in postural awareness.      Revises HEP Exercises:  Exercise #1: UBE  Comment #1: x3'  Exercise #2: Neck A-Z  Comment #2: x1  Exercise #3: Cervical Isometrics - flex, B SB, supine ext   Comment #3: ext x10 with 10\" hold   Exercise #4: scap retract   Comment #4: reviewed   Exercise #5: nerve glide - face and push away   Comment #5: L x10   Exercise #6: Quadruped - arm raises Bx10   Comment #6: Wall push-up Bx15   Exercise #7: shoulder extension L 2 band   Comment #7: Bx20 - orange   Exercise #8: B shoulder ER L2 band   Comment #8: hold for now     Treatment Today     TREATMENT MINUTES COMMENTS   Evaluation     Self-care/ Home management     Manual therapy 12 MFR/STM to cervical paraspinals L>R, suboccipitals and L upper trap " and levator.   Neuromuscular Re-education     Therapeutic Activity     Therapeutic Exercises 18 See flow sheet  No new exercises were added today   Gait training     Modality__________________                Total 30    Blank areas are intentional and mean the treatment did not include these items.       Nora Willis, PT  8/7/2018

## 2021-06-19 NOTE — LETTER
Letter by Zuleyka Spain at      Author: Zuleyka Spain Service: -- Author Type: --    Filed:  Encounter Date: 10/11/2019 Status: Signed         October 11, 2019       Ellie Echols  [address] [place] [address]    Dear Ellie Echols:    We are pleased to provide you with secure, online access to medical information for you and your family within [employer]. Per your request, we have expanded your account to allow access to the records of the following family members:              Cele LAI Onesimo (privilege ends on 10/11/2020.)               How Do I Log In?  1. In your Internet browser, go to [email or web address]  2. Log into [employer] using your [employer] Username and Password.  3. Click Sign In.        How Do I Access a Family Member's Account?  4. Select the account you want to access by clicking the [place] with the appropriate patient's name at the top of your screen.   5. You will see a disclaimer page letting you know that you will be viewing a family member's record. Review the disclaimer and then click Accept Proxy Access Disclaimer to proceed.  6. Once you switch to viewing a family member's record, you can navigate to [employer] pages the same way you would for yourself. You can return to your own account by clicking the [place] at the top of the screen with your name on it.    7. To customize the colors and names of the linked accounts, you can select Personalize from the Profile dropdown menu at the top of the screen, then click the Edit button to make changes.     Additional Information  If you have questions, visit [email or web address], e-mail [email or web address] or call [phone] to talk to our [employer] staff. Remember, [employer] is NOT to be used for urgent needs. For medical emergencies, dial 911.

## 2021-06-19 NOTE — LETTER
Letter by Zuleyka Spain at      Author: Zuleyka Spain Service: -- Author Type: --    Filed:  Encounter Date: 10/11/2019 Status: Signed         October 11, 2019       Ellie Echols  [address] [place] [address]    Dear Ellie Echols:    We are pleased to provide you with secure, online access to medical information for you and your family within [employer]. Per your request, we have expanded your account to allow access to the records of the following family members:              Reilly SHAH Onesimo (privilege ends on 10/11/2020.)           How Do I Log In?  1. In your Internet browser, go to [email or web address]  2. Log into [employer]. using your [employer]. Username and Password.  3. Click Sign In.        How Do I Access a Family Member's Account?  4. Select the account you want to access by clicking the [place] with the appropriate patient's name at the top of your screen.   5. You will see a disclaimer page letting you know that you will be viewing a family member's record. Review the disclaimer and then click Accept Proxy Access Disclaimer to proceed.  6. Once you switch to viewing a family member's record, you can navigate to [employer]. pages the same way you would for yourself. You can return to your own account by clicking the [place] at the top of the screen with your name on it.    7. To customize the colors and names of the linked accounts, you can select Personalize from the Profile dropdown menu at the top of the screen, then click the Edit button to make changes.     Additional Information  If you have questions, visit [email or web address], e-mail [email or web address] or call [phone] to talk to our [employer]. staff. Remember, [employer]. is NOT to be used for urgent needs. For medical emergencies, dial 911.

## 2021-06-19 NOTE — PROGRESS NOTES
SHe c/o burning and numbness in left shoulder and lateral side of left arm into pinky finger. Worse form elbow down. Started about 6 weeks after therapy and has been getting worse.

## 2021-06-19 NOTE — PROGRESS NOTES
[facility]     Patient Name: Ellie Echols  Date: 8/15/2018  Visit #: 8  PTA visit #:  3  Referral Diagnosis: status post CERVICAL 4 - CERVICAL 7 OPEN LEFT LAMINOPLASTY on 18 by Dr. Schofield.  Referring provider: Luda Miller, CNP  Visit Diagnosis:     ICD-10-CM    1. Chronic neck pain M54.2     G89.29    2. Cervical nerve root compression G54.2    3. Neck muscle weakness M53.82    4. Abnormal posture R29.3          Assessment:   Pt with a significant decrease in pain and tightness in her neck and left UE after MT.      Pt slowly increasing HEP compliance.     Patient is benefitting from skilled physical therapy and is making steady progress toward functional goals.  Patient is appropriate to continue with skilled physical therapy intervention, as indicated by initial plan of care.    Goal Status:   Pt. will demonstrate/verbalize independence in self-management of condition in : 6 weeks  Pt. will improve posture : and demonstrate posture with minimal to no cuing;and maintain posture for;30 minutes;for working;in 6 weeks  Patient Turn Head: for conversation;for computer;for work;with less pain;with less difficulty;in 6 weeks  Patient will look up / down: for other;for computer work;with less pain;with less difficulty;in 6 weeks  other activity: cleaning and other ADL's   Patient will decrease : NDI score;for improved quality of function;in 6 weeks  Pt will: be able to perform work tasks and other ADL's with increased strength and pain <2/10; in 6 weeks     Plan / Patient Education:     Continue with initial plan of care.  Progress with home program as tolerated.   Progress exercises per pt's tolerance.  Pt encouraged to do her exercises more often. She was instructed to do the band exercises every other day.    Subjective:     Pain ratin-3/10 L UE     Pt reports that she is feeling a little discouraged with her progress this week.  She has had more pain and  "numbness/tingling with digits 3-5 involved.      Pt reports continued pain in her left shoulder blade.       Pt spoke with Dr. Schofield and she felt like her increase in L radicular sx could be scar tissue related.  They are going to trial a cortisone injection today and see how that goes and they also discussed her EKG results.      Pt reports that she is getting to her HEP 5 days per week.      Objective:     Pt tolerates the exercises well with moderate cueing for technique and consistency.     Cervical ROM:   Flexion: WNL  Extension: WNL  Side bending: minimal loss, min soreness in left scap with L SB  Rotation: WNL    Cervical MMT (measured 7/26/18):   Flexion = 4/5  Extension = 4-/5  R SB = 4/5   Pt's strength continues to be addressed by her HEP.      Palpation:  Pt with continued tightness and tenderness over:  Bilateral cervical paraspinals = minimal - moderate   B UT's = minimal - moderate   Left scapula = minimal - moderate   Pt notes decreased pain after MT    Posture:  Pt with a significant improvement in postural awareness.      Revises HEP Exercises:  Exercise #1: UBE  Comment #1: x3'  Exercise #2: Neck A-Z  Comment #2: x2  Exercise #3: Cervical Isometrics - flex, B SB, supine ext   Comment #3: ext x10 with 10\" hold   Exercise #4: scap retract   Comment #4: reviewed   Exercise #5: nerve glide - face and push away   Comment #5: thread the needle stretch R, L x30\"  Exercise #6: Quadruped - arm raises Bx12, chin tucks   Comment #6: Wall push-up   Exercise #7: shoulder extension L 2 band   Comment #7: green Bx15  Exercise #8: B shoulder ER L2 band   Comment #8: hold for now     Treatment Today     TREATMENT MINUTES COMMENTS   Evaluation     Self-care/ Home management     Manual therapy 12 MFR/STM to cervical paraspinals L>R, suboccipitals and L upper trap and levator.   Neuromuscular Re-education     Therapeutic Activity     Therapeutic Exercises 18 See flow sheet  No new exercises were added today   Gait " training     Modality__________________                Total 30    Blank areas are intentional and mean the treatment did not include these items.       Nora Willis, PT  8/15/2018

## 2021-06-19 NOTE — PROGRESS NOTES
[facility]     Patient Name: Ellie Echols  Date: 7/17/2018  Visit #: 4  PTA visit #:  3  Referral Diagnosis: [unfilled]  Referring provider: Luda Miller CNP  Visit Diagnosis:     ICD-10-CM    1. Chronic neck pain M54.2     G89.29    2. Cervical nerve root compression G54.2    3. Neck muscle weakness M53.82    4. Abnormal posture R29.3    5. Essential hypertension I10    6. Cervical radiculitis M54.12    7. Numbness and tingling in left upper extremity R20.0     R20.2    8. Poor posture R29.3          Assessment:   Pt with increased L UE symptoms recently.  Pt demonstrates good posture and is aware of her positioning while at work.  Mild tenderness noted L upper traps and L levator.  Patient is benefitting from skilled physical therapy and is making steady progress toward functional goals.  Patient is appropriate to continue with skilled physical therapy intervention, as indicated by initial plan of care.    Goal Status: Making progress  Pt. will demonstrate/verbalize independence in self-management of condition in : 6 weeks  Pt. will improve posture : and demonstrate posture with minimal to no cuing;and maintain posture for;30 minutes;for working;in 6 weeks  Patient Turn Head: for conversation;for computer;for work;with less pain;with less difficulty;in 6 weeks  Patient will look up / down: for other;for computer work;with less pain;with less difficulty;in 6 weeks  other activity: cleaning and other ADL's   Patient will decrease : NDI score;for improved quality of function;in 6 weeks  Pt will: be able to perform work tasks and other ADL's with increased strength and pain <2/10; in 6 weeks     Plan / Patient Education:     Continue with initial plan of care.  Progress with home program as tolerated.   Progress exercises per pt's tolerance.  Pt encouraged to do her exercises more often. She was instructed to do the band exercises every other day.    Subjective:  "    Pain RatinPt states e is having more L UE \"nerve pain.\" Pt states she is more sore after she runs. She has decreased to running every other day.   Pt is to have an EMG on .   Pt has now been working for the last 2 weeks. 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   Pain ratin-2/10 L UE       Objective:     Cervical ROM: 7/10/18  Flexion: WNL  Extension: WNL \"sore\"  Side bending: moderate loss B \"stiff\"  Rotation: WNL    Exercises:  Exercise #1: UBE  Comment #1: x3'  Exercise #2: Neck A-Z  Comment #2: x1  Exercise #3: Cervical Isometrics - flex, B SB, supine ext   Comment #3: x5 with 5\" each   Exercise #4: scap retract   Comment #4: x5 with 5\"   Exercise #5: Shoulder raises to 90 - flex and ABD  Comment #5: Bx10 each   Exercise #6: rows L 2 band  Comment #6: x10  Exercise #7: shoulder extension L 2 band   Comment #7: x10  Exercise #8: B shoulder ER L2 band   Comment #8: x10  Treatment Today     TREATMENT MINUTES COMMENTS   Evaluation     Self-care/ Home management     Manual therapy 13 MFR/STM to cervical paraspinals L>R, suboccipitals and L upper trap and levator.   Neuromuscular Re-education     Therapeutic Activity     Therapeutic Exercises 12 See flow sheet  No new exercises were added today   Gait training     Modality__________________                Total 25    Blank areas are intentional and mean the treatment did not include these items.       Jinny Motta,HORACIO  2018               "

## 2021-06-19 NOTE — PROGRESS NOTES
The patient is here today for a left C6-7 TFESI.  However, she states Dr. Schofield said that she would order an MRI.  Dr. Schofield was called and said she would like for the patient to have an updated MRI but she would be okay if the patient has the injection prior to the MRI.  Offered the patient the injection today (without the updated MRI) or wait until she has had the updated MRI to have the injection (as potentially the MRI could show findings at a different level that could be causing her symptoms).  The patient opted to post-pone the injection until after she can have the MRI.    Spine clinic staff asked to place the order for an MRI of the cervical spine with and without contrast dye under Dr. Schofield's name.     No charge for today's visit.  She paid her copay and this can be refunded or held until she has her injection after the MRI.

## 2021-06-19 NOTE — PROGRESS NOTES
[facility]     Patient Name: Ellie Echols  Date: 2018  Visit #: 5  PTA visit #:  3  Referral Diagnosis: status post CERVICAL 4 - CERVICAL 7 OPEN LEFT LAMINOPLASTY on 18 by Dr. Schofield.  Referring provider: Luda Miller, CNP  Visit Diagnosis:     ICD-10-CM    1. Chronic neck pain M54.2     G89.29    2. Cervical nerve root compression G54.2    3. Neck muscle weakness M53.82    4. Abnormal posture R29.3    5. Essential hypertension I10          Assessment:   Pt with increased L UE symptoms recently.    Pt progress limited by inconsistent HEP.  PT revised HEP to focus on certain exercises and increased compliance.     Patient is benefitting from skilled physical therapy and is making steady progress toward functional goals.  Patient is appropriate to continue with skilled physical therapy intervention, as indicated by initial plan of care.    Goal Status:   Pt. will demonstrate/verbalize independence in self-management of condition in : 6 weeks  Pt. will improve posture : and demonstrate posture with minimal to no cuing;and maintain posture for;30 minutes;for working;in 6 weeks  Patient Turn Head: for conversation;for computer;for work;with less pain;with less difficulty;in 6 weeks  Patient will look up / down: for other;for computer work;with less pain;with less difficulty;in 6 weeks  other activity: cleaning and other ADL's   Patient will decrease : NDI score;for improved quality of function;in 6 weeks  Pt will: be able to perform work tasks and other ADL's with increased strength and pain <2/10; in 6 weeks     Plan / Patient Education:     Continue with initial plan of care.  Progress with home program as tolerated.   Progress exercises per pt's tolerance.  Pt encouraged to do her exercises more often. She was instructed to do the band exercises every other day.    Subjective:     Pain ratin-1/10 L UE     Pt reports that overall things are going really well.   "She is back to work and that goes fine unless she has to sit for a long time.  Yesterday she sat in one chair for a long time, so she was pretty sore.  However, she is feeling much better this AM.      The things that still trigger or increase her pain:  Sitting unsupported or looking down for any amount of time.      Pt's original sx are resolved, but she is now feeling left arm sx in fingers 4 and 5.  This is most present in the AM. Her positions during the day will also trigger this.      Pt is to have an EMG on 8/2    Pt reports that she is somewhat inconsistent with her HEP.      Objective:     Pt tolerates the exercises well with moderate cueing for technique and consistency.     Cervical ROM: 7/10/18  Flexion: WNL  Extension: WNL \"sore\"  Side bending: moderate loss B \"stiff\"  Rotation: WNL    Cervical MMT:   Flexion = 4/5  Extension = 4-/5  R SB = 4/5     Palpation:  Pt with increased tightness and tenderness over:  Bilateral cervical paraspinals = minimal - moderate   B UT's = minimal - moderate     Revises HEP Exercises:  Exercise #1: UBE  Comment #1: x3'  Exercise #2: Neck A-Z  Comment #2: x1  Exercise #3: Cervical Isometrics - flex, B SB, supine ext   Comment #3: x5 with 5\" each   Exercise #4: scap retract   Comment #4: reviewed   Exercise #5: nerve glide - face and push away   Comment #5: L x5   Exercise #6: rows L 2 band  Comment #6: d/c   Exercise #7: shoulder extension L 2 band   Comment #7: Bx20 - continue  Exercise #8: B shoulder ER L2 band   Comment #8: hold for now       Exercises:  Exercise #1: UBE  Comment #1: x3'  Exercise #2: Neck A-Z  Comment #2: x1  Exercise #3: Cervical Isometrics - flex, B SB, supine ext   Comment #3: x5 with 5\" each   Exercise #4: scap retract   Comment #4: reviewed   Exercise #5: nerve glide - face and push away   Comment #5: L x5   Exercise #6: rows L 2 band  Comment #6: d/c   Exercise #7: shoulder extension L 2 band   Comment #7: Bx20 - continue  Exercise #8: B " shoulder ER L2 band   Comment #8: hold for now      Treatment Today     TREATMENT MINUTES COMMENTS   Evaluation     Self-care/ Home management     Manual therapy 12 MFR/STM to cervical paraspinals L>R, suboccipitals and L upper trap and levator.   Neuromuscular Re-education     Therapeutic Activity     Therapeutic Exercises 18 See flow sheet  No new exercises were added today   Gait training     Modality__________________                Total 30    Blank areas are intentional and mean the treatment did not include these items.       Nora Willis, PT  7/26/2018

## 2021-06-19 NOTE — PROGRESS NOTES
[facility]     Patient Name: Ellie Echols  Date: 7/3/2018  Visit #: 2  PTA visit #:  1  Referral Diagnosis: [unfilled]  Referring provider: Luda Miller CNP  Visit Diagnosis:     ICD-10-CM    1. Chronic neck pain M54.2     G89.29    2. Cervical nerve root compression G54.2    3. Neck muscle weakness M53.82    4. Abnormal posture R29.3    5. Essential hypertension I10    6. Cervical radiculitis M54.12    7. Numbness and tingling in left upper extremity R20.0     R20.2    8. Poor posture R29.3          Assessment:     Patient is benefitting from skilled physical therapy and is making steady progress toward functional goals.  Patient is appropriate to continue with skilled physical therapy intervention, as indicated by initial plan of care.    Goal Status:  Pt. will demonstrate/verbalize independence in self-management of condition in : 6 weeks  Pt. will improve posture : and demonstrate posture with minimal to no cuing;and maintain posture for;30 minutes;for working;in 6 weeks  Patient Turn Head: for conversation;for computer;for work;with less pain;with less difficulty;in 6 weeks  Patient will look up / down: for other;for computer work;with less pain;with less difficulty;in 6 weeks  other activity: cleaning and other ADL's   Patient will decrease : NDI score;for improved quality of function;in 6 weeks  Pt will: be able to perform work tasks and other ADL's with increased strength and pain <2/10; in 6 weeks     Plan / Patient Education:     Continue with initial plan of care.  Progress with home program as tolerated.   Progress exercises per pt's tolerance.    Subjective:   Pt states she is doing better. She is back to work.  Pt was inconsistent with her HEP due to returning to work full time. Pt does change positions at work.  Pt has done some running on a treadmill. The running does not cause pain.  Pt reports tingling in the morning in her L UE to her little  "finger. Pt states this will last about 1 hour than go away.  Pain Ratin        Objective:     Cervical ROM:  Flexion: WNL  Extension: WNL \"sore\"  Side bending: moderate loss B \"stiff\"  Rotation: WNL    Exercises:  Exercise #1: UBE  Comment #1: x3'  Exercise #2: Neck A-Z  Comment #2: x1  Exercise #3: Cervical Isometrics - flex, B SB, supine ext   Comment #3: x5 with 5\" each   Exercise #4: scap retract   Comment #4: x5 with 5\"   Exercise #5: Shoulder raises to 90 - flex and ABD  Comment #5: Bx10 each   Exercise #6: rows L 2 band  Comment #6: x10  Exercise #7: shoulder extension L 2 band   Comment #7: x10  Exercise #8: B shoulder ER L2 band   Comment #8: x10  Treatment Today     TREATMENT MINUTES COMMENTS   Evaluation     Self-care/ Home management     Manual therapy     Neuromuscular Re-education     Therapeutic Activity     Therapeutic Exercises 25 See flow sheet  Added to HEP:  -rows L2 band   -shoulder extension L 2 band   -B shoulder ER   Gait training     Modality__________________                Total 25    Blank areas are intentional and mean the treatment did not include these items.       Jinny Motta,HORACIO  7/3/2018               "

## 2021-06-19 NOTE — PROGRESS NOTES
Patient presents at the request of Dr. Schofield for left upper extremity EMG.  She is status post cervical laminoplasty approximately 2 months ago.  She is having left deltoid tricep pain down the left lateral arm to digit 5 with some numbness and tingling worse in the morning.       EMG/NCS  results: Please see scanned full report    Comment NCS: Normal study  1.  Normal nerve conduction studies left upper extremity.    Comment EMG: Abnormal study  1.  Prolonged motor unit potential duration left tricep.  Remainder left upper extremity normal.    Interpretation: Abnormal study    1.  Prolonged motor unit potential duration in the left triceps.   In isolation, this is nonspecific and nondiagnostic.  Under the correct clinical conditions, this can be seen in a left C7 radiculopathy, chronic and inactive.      2. There is no electrodiagnostic evidence of   brachial plexopathy  or focal neuropathy in the left upper extremity.    The testing was completed in its entirety by the physician.       It was our pleasure caring for your patient today, if there any questions or concerns please do not hesitate to contact us.

## 2021-06-20 ENCOUNTER — HEALTH MAINTENANCE LETTER (OUTPATIENT)
Age: 46
End: 2021-06-20

## 2021-06-20 NOTE — PROGRESS NOTES
[facility]     Patient Name: Ellie Echols  Date: 8/27/2018  Visit #: 10  PTA visit #:  3  Referral Diagnosis: status post CERVICAL 4 - CERVICAL 7 OPEN LEFT LAMINOPLASTY on 5-7-18 by Dr. Schofield.  Referring provider: Luda Miller, CNP  Visit Diagnosis:     ICD-10-CM    1. Chronic neck pain M54.2     G89.29    2. Cervical nerve root compression G54.2    3. Neck muscle weakness M53.82    4. Abnormal posture R29.3    5. Essential hypertension I10    6. Cervical radiculitis M54.12          Assessment:   Pt with a significant decrease in pain and tightness in her neck and left UE after MT and estim today.  .      Pt slowly increasing HEP compliance.     Patient is benefitting from skilled physical therapy and is making steady progress toward functional goals.  Patient is appropriate to continue with skilled physical therapy intervention, as indicated by initial plan of care.    Goal Status:   Pt. will demonstrate/verbalize independence in self-management of condition in : 6 weeks  Pt. will improve posture : and demonstrate posture with minimal to no cuing;and maintain posture for;30 minutes;for working;in 6 weeks  Patient Turn Head: for conversation;for computer;for work;with less pain;with less difficulty;in 6 weeks  Patient will look up / down: for other;for computer work;with less pain;with less difficulty;in 6 weeks  other activity: cleaning and other ADL's   Patient will decrease : NDI score;for improved quality of function;in 6 weeks  Pt will: be able to perform work tasks and other ADL's with increased strength and pain <2/10; in 6 weeks     Plan / Patient Education:     Continue with initial plan of care.  Progress with home program as tolerated.   Progress exercises per pt's tolerance.  Pt encouraged to do her exercises more often. She was instructed to do the band exercises every other day.    Check the results of estim   Continue with PT 1 week after her injection.   "    Subjective:     Pain ratin-4/10 L UE     Pt reports that she found out her MRI results and they decided to do a cortisone injection and then meet with Dr. Schofield a couple weeks after that.      Pt reports continued pain in her left shoulder blade.   This is more intense today for some reason        Pt reports that she is getting to her HEP 5 days per week.      Pt always feels better the rest of the day after MT.      Objective:     Pt tolerates the exercises well with moderate cueing for technique and consistency.     Cervical ROM:   Flexion: WNL  Extension: WNL  Side bending: minimal loss, min soreness in left scap with L SB  Rotation: WNL    Cervical MMT (measured 18):   Flexion = 4/5  Extension = 4-/5  R SB = 4/5   Pt's strength continues to be addressed by her HEP.      Palpation:  Pt with continued tightness and tenderness over:  Bilateral cervical paraspinals = minimal - moderate   B UT's = minimal - moderate   Left scapula = minimal - moderate   Pt notes decreased pain after MT  No change in sx with manual gentle traction     Posture:  Pt with a significant improvement in postural awareness.      Revises HEP Exercises:  Exercise #1: UBE  Comment #1: x3'  Exercise #2: Neck A-Z  Comment #2: x2  Exercise #3: Cervical Isometrics - flex, B SB, supine ext   Comment #3: ext x10 with 5\"   Exercise #4: scap retract   Comment #4: x5 with 5\"   Exercise #5: nerve glide - face and push away - reviewed   Comment #5: gas pedal stretch   Exercise #6: Quadruped - arm raises,  chin tucks   Comment #6: Wall push-up   Exercise #7: shoulder extension L 2 band   Comment #7: orange Bx20   Exercise #8: B shoulder ER L2 band   Comment #8: hold for now     Treatment Today     TREATMENT MINUTES COMMENTS   Evaluation     Self-care/ Home management     Manual therapy 12 MFR/STM to cervical paraspinals L>R, suboccipitals and L upper trap and levator. Gentle manual cervical traction - pt supine    Neuromuscular Re-education   "   Therapeutic Activity     Therapeutic Exercises 3 UBE x 3 min    Gait training     Modality: electrical stimulation  10  (13)  Left UT/Scap/Cervical IFC  - high/sweep - supine 90/90   + set-up x 3 min               Total 28    Blank areas are intentional and mean the treatment did not include these items.       Nora Willis, PT  8/27/2018

## 2021-06-20 NOTE — PROGRESS NOTES
[facility] Daily Progress     Patient Name: Ellie Echols  Date: 9/12/2018  Visit #: 11  PTA visit #:  3  Referral Diagnosis: status post CERVICAL 4 - CERVICAL 7 OPEN LEFT LAMINOPLASTY on 5-7-18 by Dr. Schofield.  Referring provider: Luda Miller, CNP  Visit Diagnosis:     ICD-10-CM    1. Chronic neck pain M54.2     G89.29    2. Cervical nerve root compression G54.2    3. Neck muscle weakness M53.82    4. Abnormal posture R29.3    5. Essential hypertension I10          Assessment:   Pt with a significant decrease in pain and tightness in her neck and left UE after MT and estim today.  .      Pt slowly increasing HEP compliance.     Patient is benefitting from skilled physical therapy and is making steady progress toward functional goals.  Patient is appropriate to continue with skilled physical therapy intervention, as indicated by initial plan of care.    Goal Status:   Pt. will demonstrate/verbalize independence in self-management of condition in : 6 weeks  Pt. will improve posture : and demonstrate posture with minimal to no cuing;and maintain posture for;30 minutes;for working;in 6 weeks  Patient Turn Head: for conversation;for computer;for work;with less pain;with less difficulty;in 6 weeks  Patient will look up / down: for other;for computer work;with less pain;with less difficulty;in 6 weeks  other activity: cleaning and other ADL's   Patient will decrease : NDI score;for improved quality of function;in 6 weeks  Pt will: be able to perform work tasks and other ADL's with increased strength and pain <2/10; in 6 weeks     Plan / Patient Education:     Continue with initial plan of care.  Progress with home program as tolerated.   Progress exercises per pt's tolerance.  Pt encouraged to do her exercises more often. She was instructed to do the band exercises every other day.    Check the results of estim   Continue with PT 1x/week until she see's Dr. Schofield and has a POC.     Subjective:  "    Pain ratin/10, but numbness into the left lateral forearm and into digits 3, 4, 5.     Pt reports that she had a cervical injection about a week ago.  The injection has helped decreased the pain, but she now has constant left arm numbness.      Pt reports that the estim really seemed to help reduce her neck and shoulder blade pain.            Pt always feels better the rest of the day after MT.      Pt reports that she is getting to her HEP 5 days per week.    Objective:     Pt tolerates the exercises well with moderate cueing for technique and consistency.     Cervical ROM:   Flexion: WNL  Extension: WNL  Side bending: minimal loss, min soreness in left scap with L SB  Rotation: WNL    Cervical MMT (measured 18):   Flexion = 4/5  Extension = 4-/5  R SB = 4/5   Pt's strength continues to be addressed by her HEP.      Palpation:  Pt with continued tightness and tenderness over:  Bilateral cervical paraspinals = minimal - moderate   B UT's = minimal - moderate   Left scapula = minimal - moderate   Pt notes decreased pain after MT  No change in sx with manual gentle traction     Posture:  Pt with a significant improvement in postural awareness.      Revises HEP Exercises:  Exercise #1: UBE  Comment #1: x3'  Exercise #2: Neck A-Z  Comment #2: x2  Exercise #3: Cervical Isometrics - flex, B SB, supine ext   Comment #3: ext x10 with 5\"   Exercise #4: scap retract   Comment #4: x5 with 5\"   Exercise #5: nerve glide - face and push away - reviewed   Comment #5: gas pedal stretch   Exercise #6: Quadruped - arm raises,  chin tucks   Comment #6: Wall push-up   Exercise #7: shoulder extension L 2 band   Comment #7: orange Bx20   Exercise #8: B shoulder ER L2 band   Comment #8: hold for now     Treatment Today     TREATMENT MINUTES COMMENTS   Evaluation     Self-care/ Home management     Manual therapy 12 MFR/STM to cervical paraspinals L>R, suboccipitals and L upper trap and levator. Gentle manual cervical traction - " pt supine    Neuromuscular Re-education     Therapeutic Activity     Therapeutic Exercises 3 UBE x 3 min    Gait training     Modality: electrical stimulation  10  (13)  Left UT/Scap/Cervical IFC  - high/sweep - supine 90/90   + set-up x 3 min               Total 28    Blank areas are intentional and mean the treatment did not include these items.       Nora Willis, PT  9/12/2018

## 2021-06-20 NOTE — PROGRESS NOTES
[facility]     Patient Name: Ellie Echols  Date: 2018  Visit #: 9  PTA visit #:  3  Referral Diagnosis: status post CERVICAL 4 - CERVICAL 7 OPEN LEFT LAMINOPLASTY on 18 by Dr. Schofield.  Referring provider: Luda Miller, CNP  Visit Diagnosis:     ICD-10-CM    1. Chronic neck pain M54.2     G89.29    2. Cervical nerve root compression G54.2    3. Neck muscle weakness M53.82    4. Abnormal posture R29.3          Assessment:   Pt with a significant decrease in pain and tightness in her neck and left UE after MT.      Pt slowly increasing HEP compliance.     Patient is benefitting from skilled physical therapy and is making steady progress toward functional goals.  Patient is appropriate to continue with skilled physical therapy intervention, as indicated by initial plan of care.    Goal Status:   Pt. will demonstrate/verbalize independence in self-management of condition in : 6 weeks  Pt. will improve posture : and demonstrate posture with minimal to no cuing;and maintain posture for;30 minutes;for working;in 6 weeks  Patient Turn Head: for conversation;for computer;for work;with less pain;with less difficulty;in 6 weeks  Patient will look up / down: for other;for computer work;with less pain;with less difficulty;in 6 weeks  other activity: cleaning and other ADL's   Patient will decrease : NDI score;for improved quality of function;in 6 weeks  Pt will: be able to perform work tasks and other ADL's with increased strength and pain <2/10; in 6 weeks     Plan / Patient Education:     Continue with initial plan of care.  Progress with home program as tolerated.   Progress exercises per pt's tolerance.  Pt encouraged to do her exercises more often. She was instructed to do the band exercises every other day.    Determine a POC once the pt has discussed her MRI results with Dr. Schofield    Subjective:     Pain ratin-3/10 L UE     Pt reports that she was pretty sore in her  "neck from a lot of sitting in a conference.  Feeling better this AM.  Her left UE nerve sx continues to progress and worsen in the left hand.  Pt feeling sx now with driving.     Pt reports continued pain in her left shoulder blade.       Pt had the MRI and waiting to talk to Dr Schofield regarding the results.        Pt reports that she is getting to her HEP 5 days per week.      Pt always feels better the rest of the day after MT.      Objective:     Pt tolerates the exercises well with moderate cueing for technique and consistency.     Cervical ROM:   Flexion: WNL  Extension: WNL  Side bending: minimal loss, min soreness in left scap with L SB  Rotation: WNL    Cervical MMT (measured 7/26/18):   Flexion = 4/5  Extension = 4-/5  R SB = 4/5   Pt's strength continues to be addressed by her HEP.      Palpation:  Pt with continued tightness and tenderness over:  Bilateral cervical paraspinals = minimal - moderate   B UT's = minimal - moderate   Left scapula = minimal - moderate   Pt notes decreased pain after MT  No change in sx with manual gentle traction     Posture:  Pt with a significant improvement in postural awareness.      Revises HEP Exercises:  Exercise #1: UBE  Comment #1: x3'  Exercise #2: Neck A-Z  Comment #2: x2  Exercise #3: Cervical Isometrics - flex, B SB, supine ext   Comment #3: x5 with 10\" each - wall ext   Exercise #4: scap retract   Comment #4: reviewed   Exercise #5: nerve glide - face and push away - reviewed   Comment #5: gas pedal stretch Lx60\"  Exercise #6: Quadruped - arm raises,  chin tucks   Comment #6: Wall push-up Bx15  Exercise #7: shoulder extension L 2 band   Comment #7: green Bx15  Exercise #8: B shoulder ER L2 band   Comment #8: hold for now     Treatment Today     TREATMENT MINUTES COMMENTS   Evaluation     Self-care/ Home management     Manual therapy 12 MFR/STM to cervical paraspinals L>R, suboccipitals and L upper trap and levator. Gentle manual cervical traction - pt supine  "   Neuromuscular Re-education     Therapeutic Activity     Therapeutic Exercises 18 See flow sheet  No new exercises were added today   Gait training     Modality__________________                Total 30    Blank areas are intentional and mean the treatment did not include these items.       Nora Willis, PT  8/22/2018

## 2021-06-20 NOTE — PROGRESS NOTES
[facility] Daily Progress     Patient Name: Ellie Echols  Date: 2018  Visit #: 12  PTA visit #:  3  Referral Diagnosis: status post CERVICAL 4 - CERVICAL 7 OPEN LEFT LAMINOPLASTY on 18 by Dr. Schofield.  Referring provider: Luda Miller, CNP  Visit Diagnosis:     ICD-10-CM    1. Chronic neck pain M54.2     G89.29    2. Cervical nerve root compression G54.2    3. Neck muscle weakness M53.82    4. Abnormal posture R29.3          Assessment:   Pt with a significant decrease in pain and tightness in her neck and left UE after MT and estim today.  .      Pt slowly increasing HEP compliance.     Patient is benefitting from skilled physical therapy and is making steady progress toward functional goals.  Patient is appropriate to continue with skilled physical therapy intervention, as indicated by initial plan of care.    Goal Status:   Pt. will demonstrate/verbalize independence in self-management of condition in : 6 weeks  Pt. will improve posture : and demonstrate posture with minimal to no cuing;and maintain posture for;30 minutes;for working;in 6 weeks  Patient Turn Head: for conversation;for computer;for work;with less pain;with less difficulty;in 6 weeks  Patient will look up / down: for other;for computer work;with less pain;with less difficulty;in 6 weeks  other activity: cleaning and other ADL's   Patient will decrease : NDI score;for improved quality of function;in 6 weeks  Pt will: be able to perform work tasks and other ADL's with increased strength and pain <2/10; in 6 weeks     Plan / Patient Education:     Continue with initial plan of care.  Progress with home program as tolerated.   Progress exercises per pt's tolerance.  Pt encouraged to do her exercises more often. She was instructed to do the band exercises every other day.    Reassess ROM ns strength at the next visit and then pt to follow-up with Dr. Schofield and determine a POC.       Subjective:     Pain ratin/10, but  "numbness into the left lateral forearm and into digits 3, 4, 5.     Pt reports that she is feeling about the same as last week with less pain overall, but continued numbness down into her left arm.  She does note that each week she does feels some improvements, but it is minor.      Pt reports that she is seeing Dr. Schofield on 9/28/18.      Pt reports that the estim really seemed to help reduce her neck and shoulder blade pain.            Pt always feels better the rest of the day after MT.      Pt reports that she is getting to her HEP 5 days per week.    Objective:     Pt tolerates the exercises well with moderate cueing for technique and consistency.     Cervical ROM:   Flexion: WNL  Extension: WNL  Side bending: minimal loss, min soreness in left scap with L SB  Rotation: WNL    Cervical MMT (measured 7/26/18):   Flexion = 4/5  Extension = 4-/5  R SB = 4/5   Pt's strength continues to be addressed by her HEP.      Palpation:  Pt with continued tightness and tenderness over:  Bilateral cervical paraspinals = minimal - moderate   B UT's = minimal - moderate   Left scapula = minimal - moderate   Pt notes decreased pain after MT  No change in sx with manual gentle traction     Posture:  Pt with a significant improvement in postural awareness.      Revises HEP Exercises:  Exercise #1: UBE  Comment #1: x3'  Exercise #2: Neck A-Z  Comment #2: x2  Exercise #3: Cervical Isometrics - flex, B SB, supine ext   Comment #3: ext x10 with 5\"   Exercise #4: scap retract   Comment #4: x5 with 5\"   Exercise #5: nerve glide - face and push away - reviewed   Comment #5: gas pedal stretch   Exercise #6: Quadruped - arm raises,  chin tucks   Comment #6: Wall push-up   Exercise #7: shoulder extension L 2 band   Comment #7: orange Bx20   Exercise #8: B shoulder ER L2 band   Comment #8: hold for now     Treatment Today     TREATMENT MINUTES COMMENTS   Evaluation     Self-care/ Home management     Manual therapy 15 MFR/STM to cervical " paraspinals L>R, suboccipitals and L upper trap and levator. Gentle manual cervical traction - pt supine    Neuromuscular Re-education     Therapeutic Activity     Therapeutic Exercises 2 UBE x 3 min    Gait training     Modality: electrical stimulation  10  (13)  Left UT/Scap/Cervical IFC  - high/sweep - supine 90/90   + set-up x 3 min               Total 30    Blank areas are intentional and mean the treatment did not include these items.       Nora Willis, PT  9/17/2018

## 2021-06-20 NOTE — PROGRESS NOTES
Ellie had great relief with injection.     The relief last about a week but is now back to what it was prior to injection.   She c/o burning and numbness in left shoulder and lateral side of left arm into pinky finger.   Worse form elbow down.   NDI today is 20%    Left C6-7 was injected.  The diagnostic part of this injection was positive for C6-7 left nerve root compression.    The therapeutic effect did wear off.  With all the therapy and injections, she is now intractable.  He has failed conservative management.    I have recommended a disc arthroplasty.  I explained the different options of anterior fusion, posterior hemilaminotomy with foraminotomy and disc arthroplasty.  Due to her young age and the texture of the disc, I have recommended the disc arthroplasty over the other 2 options.  This will serve her better in the long run.  I discussed the risk of infection, injury to the esophagus, injury to the trachea, recurrent laryngeal nerve injury,  Bleeding, persistent neurologic symptoms and postoperative expectations.  She appeared to understand the discussion and asked appropriate questions.  I used a model to demonstrate how she can preserve motion and have no significant down the road effects on adjacent levels.    She would like to proceed.  I will submit the orders.  Spent 15 minutes in review of information, documentation, coordination of care and face-to-face discussion.

## 2021-06-20 NOTE — PROGRESS NOTES
[facility] Daily Progress     Patient Name: Ellie Echols  Date: 2018  Visit #: 13  PTA visit #:  3  Referral Diagnosis: status post CERVICAL 4 - CERVICAL 7 OPEN LEFT LAMINOPLASTY on 18 by Dr. Schofield.  Referring provider: Luda Miller, CNP  Visit Diagnosis:     ICD-10-CM    1. Chronic neck pain M54.2     G89.29    2. Cervical nerve root compression G54.2    3. Neck muscle weakness M53.82    4. Abnormal posture R29.3          Assessment:     Pt has made excellent progress with her neck ROM, strength, function and pain.  However, she continues to have increased left UE numbness down into her hand and fingers 3-5 that is the biggest concern.      Goal Status:   Pt. will demonstrate/verbalize independence in self-management of condition in : 6 weeks; Met   Pt. will improve posture : and demonstrate posture with minimal to no cuing;and maintain posture for;30 minutes;for working;in 6 weeks; Met  Patient Turn Head: for conversation;for computer;for work;with less pain;with less difficulty;in 6 weeks; Met  Patient will look up / down: for other;for computer work;with less pain;with less difficulty;in 6 weeks; Met  other activity: cleaning and other ADL's   Patient will decrease : NDI score;for improved quality of function;in 6 weeks; Not Assessed   Pt will: be able to perform work tasks and other ADL's with increased strength and pain <2/10; in 6 weeks; Met     Plan / Patient Education:     Patient to follow-up with Dr. Schofield and then determine a POC.  PT will hold the chart for 30 days and then discharge the patient if she does not return.        Subjective:     Pain ratin/10, but numbness into the left lateral forearm and into digits 3, 4, 5.     Pt reports that she overall feels better with less neck and scapular pain and tightness.  Pt reports that she feels stronger and is back to all of her normal activities.      However, to continued to have left UE numbness that radiates into  "digits 3-5.      Pt reports that she is seeing Dr. Schofield on 9/28/18.      Pt reports that the estim really seemed to help reduce her neck and shoulder blade pain.          Pt always feels better the rest of the day after MT.      Pt reports that she is getting to her HEP 5 days per week.    Objective:     Pt has progressed well in regards to decreased neck pain, increased mobility ans strength, but no significant change in her left hand numbness.       Cervical ROM:   Flexion: WNL  Extension: WNL  Side bending:WNL  Rotation: WNL  All pain free and NO increase in left LE sx.     Cervical MMT   Flexion = 5/5  Extension = 5/5  R SB = 5/5     Palpation:  Pt with decreased tightness and tenderness over:  Bilateral cervical paraspinals = minimal   B UT's = minimal - minimal  Left scapula = minimal - none to minimal     No change in sx with manual gentle traction     Posture:  Pt with a significant improvement in postural awareness.      Revises HEP Exercises:  Exercise #1: UBE  Comment #1: x3'  Exercise #2: Neck A-Z  Comment #2: x2  Exercise #3: Cervical Isometrics - flex, B SB, supine ext   Comment #3: ext x10 with 5\"   Exercise #4: scap retract   Comment #4: x5 with 5\"   Exercise #5: nerve glide - face and push away - reviewed   Comment #5: gas pedal stretch   Exercise #6: Quadruped - arm raises,  chin tucks   Comment #6: Wall push-up   Exercise #7: shoulder extension L 2 band   Comment #7: orange Bx20   Exercise #8: B shoulder ER L2 band   Comment #8: hold for now     Treatment Today     TREATMENT MINUTES COMMENTS   Evaluation     Self-care/ Home management     Manual therapy 8 MFR/STM to cervical paraspinals L>R, suboccipitals and L upper trap and levator. Gentle manual cervical traction - pt supine    Neuromuscular Re-education     Therapeutic Activity     Therapeutic Exercises 7 Discussion of POC, Sx and reassessment of MMT, ROM, etc.    Gait training     Modality: electrical stimulation  10  (13)  Left " UT/Scap/Cervical IFC  - high/sweep - supine 90/90   + set-up x 3 min               Total 28    Blank areas are intentional and mean the treatment did not include these items.       Nora Willis, PT  9/26/2018

## 2021-06-20 NOTE — PROGRESS NOTES
Assessment:    Concern of new food allergy including banana, carrot, avocado, mollusks and egg.  Based on presentation also concern of inflammatory bowel condition.  Consider mast cell disorder.      History of food allergy to tree nuts.    Likely there is a degree of chronic idiopathic urticaria.    Asthma that is primarily exercise-induced.  Normal spirometry and nitric oxide today.    Plan:    We will do specific IgE testing for above foods.  Also a serum tryptase.  Continue to carry EpiPen and avoidance of all nuts.  Continue follow-up with gastroenterology   I recommend antihistamine such as cetirizine 10 mg twice daily if having frequent hives.    Albuterol 2 puffs every 4 hours as needed and pretreat with exercise.  Follow-up annually in allergy clinic if doing well.  Sooner with new concerns.    ____________________________________________________________________________     Patient comes in today for evaluation of hives and concerned about food allergy.  Recently was diagnosed with nut allergy in February.  She is concerned about additional food allergies.  She identifies banana, carrots and avocado as causing stomach upset, vomiting and diarrhea.  Usually she gets stomach cramps within an hour and then vomiting 2-3 hours later.  She does get hives she reports with these as well.  She describes flushing with red wine.  She identifies eggs and a nutritional supplement that contains mollusc as causing similar symptoms.  She is now avoiding all of these foods.  She does have hives without any clear food association at times.  In the past she is developed hives when she has been running.  She also developed hives with a hot shower.  Since stopping her antihistamines she is having daily hives.  No associated wheezing, vomiting or diarrhea with these hives.  She does have seasonal allergies but she describes them as mild.  Previous diagnosis of irritable bowel syndrome.  She did have one episode with some blood  in her stool.  She notes that she saw gastroenterology this morning.  She is scheduled to have endoscopy done.  Patient has a history of exercise-induced asthma.     physical Exam:  General:  Alert and Oriented.  Eyes:  Sclera clear. Nose: pale boggy mucosal membranes.  Throat:  pink moist, no lesions.  Lungs:  clear to auscultation. Skin:  no rashes    Spirometry: FEV1 to FVC ratio is 86%.  FEV1 is 2.71 L which is 110% of predicted.  FVC is 3.15 L which is 104% of predicted.  This is normal spirometry    Nitric oxide is 21 ppb.    40 min spent in direct contact with the patient.  More than 50% in counseling and coordination of care.

## 2021-06-20 NOTE — PROGRESS NOTES
Ellie had great relief with injection. The relief last about a week but is now back to what it was prior to injection. She c/o burning and numbness in left shoulder and lateral side of left arm into pinky finger. Worse form elbow down.   NDI today is 20%  Stephen,[place]

## 2021-06-20 NOTE — PROGRESS NOTES
1. Gastroenteritis          Plan: I think the differential here includes gastroenteritis and also possibly some sort of food reaction, but that is going to be difficult to discern right now.  I suggested that she do occasions that were given to her at urgent care last night.  She got them but never had started taking them today.  She should take the Zofran when she gets nauseated, and she can certainly start the pantoprazole that was given to her which might help with the feelings in her stomach.  Otherwise I think that this will resolve over the next few days and she will follow-up if she seems to be getting worse instead of better.    Subjective: 32-year-old female is here today with concerns about stomach cramping and pain.  She states that this started 2 days ago.  She is great deal into natural treatments including natural oils and other nontraditional medications.  She is try to take some supplements for this heartburn but then she vomited a couple of times and started having diarrhea.  This persisted and she was seen in urgent care yesterday and they did a gallbladder ultrasound which was normal, as well as a bunch of blood test which I reviewed today from the emergency room and they were all normal she continues today to not feel 100% well, but she is certainly improving.    Objective: Well-appearing female in no acute distress.  Vital signs as noted.  Chest clear to auscultation.  Heart regular in rhythm.  Abdomen slightly tender in the right upper quadrant, but no guarding or rebound is present.  Bowel sounds are present in all quadrants.

## 2021-06-21 NOTE — ANESTHESIA POSTPROCEDURE EVALUATION
Patient: Ellie Echols   CERVICAL 6-7 MOBI C DISC ARTHROPLASTY  Anesthesia type: general    Patient location: Phase II Recovery  Last vitals:   Vitals:    11/05/18 1245   BP: 109/69   Pulse: 64   Resp: 14   Temp:    SpO2: (!) 16%     Post vital signs: stable  Level of consciousness: awake, alert and oriented  Post-anesthesia pain: pain controlled  Post-anesthesia nausea and vomiting: yes, resolved  Pulmonary: unassisted, return to baseline  Cardiovascular: stable and blood pressure at baseline  Hydration: adequate  Anesthetic events: no    QCDR Measures:  ASA# 11 - Shama-op Cardiac Arrest: ASA11B - Patient did NOT experience unanticipated cardiac arrest  ASA# 12 - Shama-op Mortality Rate: ASA12B - Patient did NOT die  ASA# 13 - PACU Re-Intubation Rate: ASA13B - Patient did NOT require a new airway mgmt  ASA# 10 - Composite Anes Safety: ASA10A - No serious adverse event    Additional Notes:

## 2021-06-21 NOTE — PROGRESS NOTES
Adriana is status post Mobi-C arthroplasty C6-7 on 11/5/2018 by Dr. Schofield.  Today she called to request a referral to ENT due to continues voice hoarseness. No issue with swallowing.  Lexi Monzon, RN, CNRN

## 2021-06-21 NOTE — PROGRESS NOTES
[facility]   Cervical Thoracic Initial Evaluation    Patient Name: Ellie Echols  Date of evaluation: 11/23/2018  Referral Diagnosis:   Cervical nerve root compression [G54.2]  - Primary   Mobi-C disc replacement C6-7 on 11/5/18  Referring provider: Yu Schofield MD  Visit Diagnosis:     ICD-10-CM    1. Neck pain M54.2    2. Neck stiffness M43.6    3. Neck muscle weakness M53.82    4. Decreased ROM of neck R29.898        Assessment:       Ellie Echols is a 43 y.o. female who presents to therapy today with chief complaints of bilateral neck tightness, stiffness, and mild pain following C6-7 Mobi-C disc replacement on 11/5/18. The patient reports resolution of nerve pain with this, but now has some stiffness and tightness in the neck and shoulders, as well as some fatigue and discomfort with looking down. With examination, the patient demonstrates decreased cervical ROM, increased tension in cervical musculature, and mild + median nerve tension testing. The patient had normal myotome/dermatome screens. She would likely benefit from skilled PT to improve her cervical strength, ROM/stiffness, pain, and general function. Will initiate MEDX when patient is 6-8 weeks out from surgical date.    Pt. is appropriate for skilled PT intervention as outlined in the Plan of Care (POC).  Pt. is a good candidate for skilled PT services to improve pain levels and function.    POC and pathology of condition were reviewed with patient.  Pt. is in agreement with the Plan of Care  A Home Exercise Program (HEP) was initiated today.  Pt. was instructed in exercises by PT and patient was given a handout with detailed instructions.    Goals:  Pt. will be independent with home exercise program in : 4 weeks  Pt. will report decreased intensity, frequency of : in 4 weeks;Comment  Comment:: 50%    Patient will decrease : NDI score;for improved quality of function;in 6 weeks;by _ points  by ___ points: 10%  Pt will: be  able to return to running without increased pain in 10 weeks:  Pt will: be able to drive for > 30 minutes without increased pain in 8 weeks:      Patient's expectations/goals are realistic.    Barriers to Learning or Achieving Goals:  No Barriers.       Plan / Patient Instructions:        Plan of Care:   Communication with: Referral Source  Patient Related Instruction: Nature of Condition;Posture;Precautions;Treatment plan and rationale;Next steps;Expected outcome;Self Care instruction;Basis of treatment;Body mechanics  Times per Week: 1-2  Number of Weeks: 12  Number of Visits: 16  Discharge Planning: pt will meet all PT goals or reach a plateau   Precautions / Restrictions : s/p mobi-C C6-7 18  Therapeutic Exercise: Stretching;ROM;Strengthening  Neuromuscular Reeducation: kinesio tape;posture;core;TNE  Manual Therapy: soft tissue mobilization;myofascial release;joint mobilization;muscle energy  Modalities: electrical stimulation;TENS;other  Modalities: PRN      Plan for next visit: review HEP, continue TASTM, scapular strengthening and cervical isometrics.     Subjective:         Social information:   Occupation:   Work Status: Has not returned yet, thinking of returning on Monday    History of Present Illness:      Pain started back about 3 years ago had tingling, had a laminoplasty C4-7 in May 2018, fixed things for about 6 weeks and started getting nerve pain again. With this had a discectomy at C6-7 on 18. Since then nerve pain gone, occasional ache in the left arm. Mostly now some stiffness and pain/tightness in the neck and shoulders now. Worse with looking down, lifting due to restrictions. Better with flexeril, laying down.    Pain Ratin  Pain rating at best: 1  Pain rating at worst: 2  Pain description: aching, dull, pain and tightness    Functional limitations are described as occurring with:   Looking down, typing, lifting due to restrictions, talking.         Objective:       Note: Items left blank indicates the item was not performed or not indicated at the time of the evaluation.    Patient Outcome Measures :    Neck Disability Score in %: 24     Scores range from 0-100%, where a score of 0% represents minimal pain and maximal function. The minmal clinically important difference is a score reduction of 10%.    Cervical Thoracic Examination  1. Neck pain     2. Neck stiffness     3. Neck muscle weakness     4. Decreased ROM of neck       Precautions/Restrictions: Mobi-C C6-7 11/5/18  Involved side: Bilateral- was left radiculitis  Posture Observation:      General sitting posture is  normal.  General standing posture is normal.    Cervical ROM:    Date: 11/23/18     *Indicate scale AROM AROM AROM   Cervical Flexion 30 deg     Cervical Extension 60 deg      Right Left Right Left Right Left   Cervical Sidebending 20 deg 20 deg       Cervical Rotation 45 deg 60 deg       Thoracic Rotation           Strength     Date: 11/23/18     Cervical Myotomes/5 Right Left Right Left Right Left   Cervical Flexion (C1-2)         Cervical Sidebending (C3)         Shoulder Elevation (C4) 5 5       Shoulder Abduction (C5) 5 5       Elbow Flexion (C6) 5 5       Elbow Extension (C7) 5 5       Wrist Flexion (C7) 5 5       Wrist Extension (C6) 5 5       Thumb abduction (C8) 5 5       Finger Abduction (T1) 5 5         Sensation   WNL to lt touch sensation screen Reflex Testing  Cervical Dermatomes Right Left UE Reflexes Right Left   Back of the Head (C2)   Biceps (C5-6)     Supraclavicular Fossa (C3)   Brachioradialis (C5-6)     AC Joint (C4)   Triceps (C7-8)     Lateral Biceps (C5)   Jia s test     Palmar Thumb (C6)   LE Reflexes     Palmar 3rd Finger (C7)   Patellar (L3-4)     Palmar 5th Finger (C8)   Achilles (S1-2)     Ulnar Forearm (T1)   Babinski Response       Flexibility: Limited cervical flexibility    Palpation: mild tenderness to B UT, levator, cervical paraspinals.    Passive Mobility-Joint  Integrity: Not tested.    Cervical Special Tests     Cervical Special Tests Right Left UE Nerve Mobility Right Left   Cervical compression   Median nerve - +   Cervical distraction   Ulnar nerve - -   Spurling s test   Radial nerve - -   Shoulder abduction sign   Thoracic outlet     Deep neck flexor endurance test   Renu        Adson s     Sharper-Marques   Cervical rotation lateral flexion     Alar ligament test   Other:     Transverse Ligament Test   Other:         UE Screen: WNL    Treatment Today     TREATMENT MINUTES COMMENTS   Evaluation 15 Low complexity   Self-care/ Home management     Manual therapy 10 TASTM to B UT, levator, cervical paraspinals in sitting down regulating strokes.   Neuromuscular Re-education     Therapeutic Activity     Therapeutic Exercises 10 Education on HEP, hold on running but continue aerobics, handout of HEP given   Gait training     Modality__________________                Total 35    Blank areas are intentional and mean the treatment did not include these items.     PT Evaluation Code: (Please list factors)  Patient History/Comorbidities:   Past Medical History:   Diagnosis Date     Asthma      Bladder infection      Cervical cord compression with myelopathy (H)      Chronic back pain      Depression      Foraminal stenosis of cervical region      GERD (gastroesophageal reflux disease)      Hypertension      Internal hemorrhoids      Kidney stone      Migraine      Nephrolithiasis      Painful swelling of joint    Examination: see objective  Clinical Presentation: stable  Clinical Decision Making: low    Patient History/  Comorbidities Examination  (body structures and functions, activity limitations, and/or participation restrictions) Clinical Presentation Clinical Decision Making (Complexity)   No documented Comorbidities or personal factors 1-2 Elements Stable and/or uncomplicated Low   1-2 documented comorbidities or personal factor 3 Elements Evolving clinical presentation  with changing characteristics Moderate   3-4 documented comorbidities or personal factors 4 or more Unstable and unpredictable High            Nikolai MENDIETA  11/23/2018  8:50 AM

## 2021-06-21 NOTE — ANESTHESIA PREPROCEDURE EVALUATION
Anesthesia Evaluation      Patient summary reviewed   No history of anesthetic complications     Airway   Mallampati: I  Neck ROM: limited   Pulmonary - normal exam    breath sounds clear to auscultation  (+) asthma    (-) not a smoker (Former)                         Cardiovascular - normal exam  Exercise tolerance: > or = 4 METS  (+) hypertension, ,     (-) murmur  ECG reviewed  Rhythm: regular  Rate: normal,    no murmur      Neuro/Psych    (+) neuromuscular disease (Cervical nerve root compression),  depression, chronic pain    Comments: Migeraine    Endo/Other - negative ROS      GI/Hepatic/Renal    (+) GERD well controlled,   chronic renal disease (Kidney stone hx),      Other findings: 08/20/18 1632 MR Cervical Spine With Without Contrast View Image  Impression:   CONCLUSION:  1. There has been interval spine surgery performed since the prior 2/27/2018 MRI with left-sided C4 through C7 laminoplasties. The central spinal canal is widely patent.    2. At C6-C7 there is severe left-sided neural foraminal stenosis and mild to moderate right-sided neural foraminal stenosis.    3. At C5-C6 there is mild right-sided neural foraminal stenosis.    4. At C4-C5 there is moderate right-sided neural foraminal stenosis.    5. The degree of foraminal stenoses has not definitely changed since the prior study.           Dental - normal exam                        Anesthesia Plan  Planned anesthetic: general endotracheal  Glidescope, neutral intubation  Ketamine 25 mg IV    Decadron 10 mg IV  Zofran    Propofol 25 mcg/kg/min for PONV  ASA 2   Induction: intravenous   Anesthetic plan and risks discussed with: patient  Anesthesia plan special considerations: video-assisted, antiemetics,   Post-op plan: routine recovery

## 2021-06-21 NOTE — PROGRESS NOTES
Pt is here for a wound check s/p CERVICAL 6-7 MOBI C DISC ARTHROPLASTY on 11-5-18 by Dr. Schofield.   Before surgery, she reports she had numbness and pain in her left hand.     Today, she reports pre op pain and numbness is completely gone. She denies tingling or weakness.   She called 3 days post op to report difficulty swallowing liquids.  She was prescribed decadron and Thick-it and did not start it as she initiated a chin tuck method of swallowing which was effective.  Swallowing and hoarseness improves a little each day.    She is anxious to increase activity/asking when she can run again.  I reviewed activity restrictions with her in effect until her follow-up in 4 weeks.  Order for PT placed and Medx to begin when appropriate.  Pt would like to come to [place] for PT.     She is taking ES tylenol for pain and had some flexeril from previous surgery she is taking for the typical neck muscle spasms.  She requested refill and will provide so she has it available during PT.    Surgical wound WNL, no dermabond left on incision - CDI, no signs of infection or skin breakdown.  Incision well-healed: good skin approximation, no redness or visible/palpable edema, no tenderness to palpation.  PT. AF, denies fever, chills or sweats.  Pt. reports that the symptoms are improved from pre-op.    Jenise Flores RN

## 2021-06-21 NOTE — ANESTHESIA CARE TRANSFER NOTE
Last vitals:   Vitals:    11/05/18 1120   BP: 129/85   Pulse: 80   Resp: 20   Temp: 37.4  C (99.4  F)   SpO2: 99%     Patient's level of consciousness is drowsy  Spontaneous respirations: yes  Maintains airway independently: yes  Dentition unchanged: yes  Oropharynx: oropharynx clear of all foreign objects    QCDR Measures:  ASA# 20 - Surgical Safety Checklist: WHO surgical safety checklist completed prior to induction  PQRS# 430 - Adult PONV Prevention: 4558F - Pt received => 2 anti-emetic agents (different classes) preop & intraop  ASA# 8 - Peds PONV Prevention: NA - Not pediatric patient, not GA or 2 or more risk factors NOT present  PQRS# 424 - Shama-op Temp Management: 4559F - At least one body temp DOCUMENTED => 35.5C or 95.9F within required timeframe  PQRS# 426 - PACU Transfer Protocol: - Transfer of care checklist used  ASA# 14 - Acute Post-op Pain: ASA14B - Patient did NOT experience pain >= 7 out of 10

## 2021-06-21 NOTE — PROGRESS NOTES
[facility] Discharge Summary    Patient Name: Ellie Echols  Date: 10/30/2018  Referring provider: Luda Miller CNP  Visit Diagnosis:   1. Chronic neck pain     2. Cervical nerve root compression     3. Neck muscle weakness     4. Abnormal posture         Goals:  Pt. will demonstrate/verbalize independence in self-management of condition in : 6 weeks; Met   Pt. will improve posture : and demonstrate posture with minimal to no cuing;and maintain posture for;30 minutes;for working;in 6 weeks; Met  Patient Turn Head: for conversation;for computer;for work;with less pain;with less difficulty;in 6 weeks; Met  Patient will look up / down: for other;for computer work;with less pain;with less difficulty;in 6 weeks; Met  other activity: cleaning and other ADL's   Patient will decrease : NDI score;for improved quality of function;in 6 weeks; Not Assessed   Pt will: be able to perform work tasks and other ADL's with increased strength and pain <2/10; in 6 weeks; Met     Patient was seen for 13 visits with the last visit on 92/26/18.    The patient was following up with her neurosurgeon due to continued left UE radicular sx.  Original goals addressing the postoperative neck pain were addressed and met.      Therapy will be discontinued at this time.  The patient will need a new referral to resume.    Thank you for your referral.  Nora Willis  10/30/2018  7:44 AM

## 2021-06-21 NOTE — PROGRESS NOTES
Preoperative Exam    Scheduled Procedure: 1  CERVICAL 6-7 MOBI C DISC ARTHROPLASTY  Surgery Date:  11/06/2018  Surgery Location: [facility], fax [phone]    Surgeon:  Dr. Yu Schofield    Assessment/Plan:     1. Preop examination    She should do fine with the surgery with the appropriate anesthesia.  She has basically had an unchanged medical history since last preop that was done earlier in 2018.  She can follow-up with us here in primary care needs afterwards as needed.    - Basic Metabolic Panel  - Hemoglobin    2. Cervical cord compression with myelopathy (H)      3. Hypertension    Hypertension is been under excellent control on the medications that she has been taking.  I told her that she can skip the medications on the morning of her surgery and just take her on that day.    4. Gastroesophageal reflux disease without esophagitis          Surgical Procedure Risk: Low (reported cardiac risk generally < 1%)  Have you had prior anesthesia?: Yes  Have you or any family members had a previous anesthesia reaction:  No  Do you or any family members have a history of a clotting or bleeding disorder?: No  Cardiac Risk Assessment: no increased risk for major cardiac complications    Patient approved for surgery with general or local anesthesia.      Functional Status: Independent  Patient plans to recover at home with family.     Subjective:      Ellie Echols is a 42 y.o. female who presents for a preoperative consultation.  Patient is here for preoperative consultation today in regards to an upcoming neck surgery to be done by Dr. Schofield.  She had a another procedure earlier in 2018.  And now she needs to have this disc arthroplasty done this fall.  She continues to have pain down her left arm and that is the reason for the procedure.    She is generally pretty healthy, she has some mild hypertension which is under excellent control.  She also has some GERD and takes reflux medication for that with good  success.    All other systems reviewed and are negative, other than those listed in the HPI.    Pertinent History  Do you have difficulty breathing or chest pain after walking up a flight of stairs: No  History of obstructive sleep apnea: No  Steroid use in the last 6 months: Yes: steroid injections  Frequent Aspirin/NSAID use: No  Prior Blood Transfusion: No  Prior Blood Transfusion Reaction: No  If for some reason prior to, during or after the procedure, if it is medically indicated, would you be willing to have a blood transfusion?:  There is no transfusion refusal.    Current Outpatient Prescriptions   Medication Sig Dispense Refill     citalopram (CELEXA) 20 MG tablet TAKE 1 TABLET BY MOUTH EVERY DAY 90 tablet 5     dicyclomine (BENTYL) 20 mg tablet Take 20 mg by mouth every 6 (six) hours as needed (irritable bowel syndrome).        L. ACIDOPHILUS/BIFID. ANIMALIS (DAILY PROBIOTIC ORAL) Take 1 tablet by mouth daily.       lidocaine (XYLOCAINE) 5 % ointment Apply 1 teaspoon to painful area once a day 35.44 g 0     lidocaine 4 % patch Place 1 patch on the skin daily. Remove and discard patch with 12 hours or as directed by MD. 7 patch 0     losartan-hydrochlorothiazide (HYZAAR) 100-25 mg per tablet TAKE 1 TABLET BY MOUTH DAILY. 90 tablet 5     MULTIVIT-MINERALS/FERROUS FUM (MULTI VITAMIN ORAL) Take 1 tablet by mouth daily.       omega-3/dha/epa/fish oil (FISH OIL-OMEGA-3 FATTY ACIDS) 300-1,000 mg capsule Take 2 capsules (2 g total) by mouth daily. Plant base omega  0     omeprazole (PRILOSEC) 20 MG capsule Take 20 mg by mouth daily before breakfast.       albuterol (VENTOLIN HFA) 90 mcg/actuation inhaler Inhale 2 puffs every 6 (six) hours as needed for wheezing. 18 g 5     EPINEPHrine (AUVI-Q) 0.3 mg/0.3 mL injection Inject into thigh for allergic reaction 2 Pre-filled Pen Syringe 0     No current facility-administered medications for this visit.         Allergies   Allergen Reactions     Dix Hives     Banana  Hives and Nausea Only     Minocycline Hives     Peanut Hives     Tree Nuts Hives     Including peanuts     [place] Hives     Avocado      Red Wine Extract      Compazine [Prochlorperazine] Anxiety       Patient Active Problem List   Diagnosis     Lower Back Pain     Nephrolithiasis     Hypertension     Migraine Headache     Internal Hemorrhoids     Chronic Major Depression     Esophageal reflux     Urinary calculus, unspecified     Foraminal stenosis of cervical region     Cervical cord compression with myelopathy (H)     Cervical nerve root compression       Past Medical History:   Diagnosis Date     Asthma      Bladder infection      Chronic back pain      Depression      Foraminal stenosis of cervical region      GERD (gastroesophageal reflux disease)      Hypertension      Internal hemorrhoids      Kidney stone      Migraine      Painful swelling of joint        Past Surgical History:   Procedure Laterality Date     CARPAL TUNNEL RELEASE Right       SECTION      five     LAMINOPLASTY Left 2018    Procedure: CERVICAL 4 - CERVICAL 7 OPEN LEFT LAMINOPLASTY ;  Surgeon: Yu Schofield MD;  Location: [facility] Main OR;  Service:      MA CYSTOURETHROSCOPY      Description: Cystoscopy (For Therapy);  Proc Date: 06/15/2012;  Comments: Performed at [facility]     TUBAL LIGATION       URETEROSCOPY         Social History     Social History     Marital status:      Spouse name: N/A     Number of children: N/A     Years of education: N/A     Occupational History     Not on file.     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  She and her  " home school their 5 children.           Objective:     Vitals:    10/31/18 1528   BP: 120/70   Pulse: 80   Temp: 98.5  F (36.9  C)   TempSrc: Oral   SpO2: 100%   Weight: 136 lb 8 oz (61.9 kg)   Height: 5' 3\" (1.6 m)   LMP: 10/10/2018         Physical Exam:  Physical Exam    General: Awake, Alert and Cooperative   Head: Normocephalic and Atraumatic   Eyes: PERRL, EOMI.   ENT: Normal pearly TMs bilaterally and Oropharynx clear   Neck: Supple and Thyroid without enlargement or nodules   Chest: Chest wall normal   Lungs: Clear to auscultation bilaterally   Heart:: Regular rate and rhythm and no murmurs.  No significant LE edema.   Abdomen: Soft, nontender, nondistended and no hepatosplenomegaly   Musculoskeletal: Moving all extremities and No pain in the extremities   Neuro: Alert and oriented times 3 and Grossly normal   Skin: No rashes or lesions noted       There are no Patient Instructions on file for this visit.      Labs:  Recent Results (from the past 24 hour(s))   Basic Metabolic Panel    Collection Time: 10/31/18  4:03 PM   Result Value Ref Range    Sodium 138 136 - 145 mmol/L    Potassium 3.9 3.5 - 5.0 mmol/L    Chloride 102 98 - 107 mmol/L    CO2 23 22 - 31 mmol/L    Anion Gap, Calculation 13 5 - 18 mmol/L    Glucose 81 70 - 125 mg/dL    Calcium 9.6 8.5 - 10.5 mg/dL    BUN 13 8 - 22 mg/dL    Creatinine 0.70 0.60 - 1.10 mg/dL    GFR MDRD Af Amer >60 >60 mL/min/1.73m2    GFR MDRD Non Af Amer >60 >60 mL/min/1.73m2   Hemoglobin    Collection Time: 10/31/18  4:03 PM   Result Value Ref Range    Hemoglobin 12.1 12.0 - 16.0 g/dL       Immunization History   Administered Date(s) Administered     Influenza, Seasonal, Inj PF IIV3 09/25/2012, 01/07/2016     Influenza, inj, historic,unspecified 11/05/2008, 11/02/2011     Td,adult,historic,unspecified 07/31/2006     Tdap 07/31/2006           Electronically signed by Tono Calvillo MD 11/01/18 3:22 PM  "

## 2021-06-22 NOTE — PROGRESS NOTES
[facility] Daily Progress     Patient Name: Ellie Echols  Date: 11/30/2018  Visit #: 2  PTA visit #:  -  Referral Diagnosis:   Cervical nerve root compression [G54.2]  - Primary   Mobi-C disc replacement C6-7 on 11/5/18  Referring provider: Yu Scohfield MD  Visit Diagnosis:     ICD-10-CM    1. Neck pain M54.2    2. Neck stiffness M43.6    3. Neck muscle weakness M53.82    4. Decreased ROM of neck R29.898      Ellie Echols is a 43 y.o. female who presents to therapy today with chief complaints of bilateral neck tightness, stiffness, and mild pain following C6-7 Mobi-C disc replacement on 11/5/18. The patient reports resolution of nerve pain with this, but now has some stiffness and tightness in the neck and shoulders, as well as some fatigue and discomfort with looking down. With examination, the patient demonstrates decreased cervical ROM, increased tension in cervical musculature, and mild + median nerve tension testing. The patient had normal myotome/dermatome screens. She would likely benefit from skilled PT to improve her cervical strength, ROM/stiffness, pain, and general function. Will initiate MEDX when patient is 6-8 weeks out from surgical date.    Assessment:     HEP/POC compliance is  good .     Patient showing improvements in cervical ROM and decreased neck tenderness with STM today. We did initiate light cervical strengthening with isometrics today and the patient is appropriate to continue with skilled PT per POC.    Goal Status:  Pt. will be independent with home exercise program in : 4 weeks  Pt. will report decreased intensity, frequency of : in 4 weeks;Comment  Comment:: 50%    Patient will decrease : NDI score;for improved quality of function;in 6 weeks;by _ points  by ___ points: 10%  Pt will: be able to return to running without increased pain in 10 weeks:  Pt will: be able to drive for > 30 minutes without increased pain in 8 weeks:      Plan / Patient Education:      Continue with initial plan of care.     Plan for next visit: review HEP, continue STM and TASTM, scapular strengthening.     Subjective:     Pain Ratin    Feeling pretty good, had an order for ENT. Had a drive to [place] to an from, since then the left arm has been sore and tired. Not painful though.    Objective:     Increased tension to left sided cervical paraspinals- no increased pain.    Imroved with STM and TPR.  Continued hoarseness in her voice.    Cervical ROM:    Date: 18     *Indicate scale AROM AROM AROM   Cervical Flexion 30 deg     Cervical Extension 60 deg      Right Left Right Left Right Left   Cervical Sidebending 20 deg 20 deg       Cervical Rotation 45 deg 60 deg       Thoracic Rotation             Treatment Today     TREATMENT MINUTES COMMENTS   Evaluation     Self-care/ Home management     Manual therapy 20 STM and MFR in supine to cervical paraspinals, suboccipital, UT, levator. TASTM to UT, levator and rhomboids in sitting- downregulating strokes.   Neuromuscular Re-education     Therapeutic Activity     Therapeutic Exercises 9 UBE warmup X 3 min. Review and progression of HEP   Gait training     Modality__________________                Total     Blank areas are intentional and mean the treatment did not include these items.       Nikolai MENDIETA  2018

## 2021-06-22 NOTE — PROGRESS NOTES
[facility] Daily Progress     Patient Name: Ellie Echols  Date: 12/12/2018  Visit #: 3  PTA visit #:  -  Referral Diagnosis:   Cervical nerve root compression [G54.2]  - Primary   Mobi-C disc replacement C6-7 on 11/5/18  Referring provider: Yu Schofield MD  Visit Diagnosis:     ICD-10-CM    1. Neck pain M54.2    2. Neck stiffness M43.6    3. Neck muscle weakness M53.82    4. Decreased ROM of neck R29.898      Ellie Echols is a 43 y.o. female who presents to therapy today with chief complaints of bilateral neck tightness, stiffness, and mild pain following C6-7 Mobi-C disc replacement on 11/5/18. The patient reports resolution of nerve pain with this, but now has some stiffness and tightness in the neck and shoulders, as well as some fatigue and discomfort with looking down. With examination, the patient demonstrates decreased cervical ROM, increased tension in cervical musculature, and mild + median nerve tension testing. The patient had normal myotome/dermatome screens. She would likely benefit from skilled PT to improve her cervical strength, ROM/stiffness, pain, and general function. Will initiate MEDX when patient is 6-8 weeks out from surgical date.    Assessment:     HEP/POC compliance is  good .     Patient showing improvements in cervical ROM and decreased neck tenderness with STM today. We did initiate light cervical and scapular strengthening and the patient is appropriate to continue with skilled PT per POC.    Goal Status:  Pt. will be independent with home exercise program in : 4 weeks  Pt. will report decreased intensity, frequency of : in 4 weeks;Comment  Comment:: 50%    Patient will decrease : NDI score;for improved quality of function;in 6 weeks;by _ points  by ___ points: 10%  Pt will: be able to return to running without increased pain in 10 weeks:  Pt will: be able to drive for > 30 minutes without increased pain in 8 weeks:      Plan / Patient Education:     Continue  with initial plan of care.     Plan for next visit: review HEP, continue STM and TASTM, scapular strengthening.     Subjective:     Pain Ratin    Saw ENT who said voice should get better with time. Was sore in the arm after the last PT, now starting to feel a little bit better.    Objective:     Increased tension to left sided cervical paraspinals- no increased pain.    Imroved with STM and TPR.  Continued hoarseness in her voice.    Addition of I's and T's to HEP for scapular strengthening.    Cervical ROM:    Date: 18     *Indicate scale AROM AROM AROM   Cervical Flexion 30 deg 55 deg    Cervical Extension 60 deg 55 deg     Right Left Right Left Right Left   Cervical Sidebending 20 deg 20 deg 30 deg 28 deg     Cervical Rotation 45 deg 60 deg 60 deg 72 deg     Thoracic Rotation             Treatment Today     TREATMENT MINUTES COMMENTS   Evaluation     Self-care/ Home management     Manual therapy 15 STM and MFR in supine to cervical paraspinals, suboccipital, UT, levator. TASTM to UT, levator and rhomboids in sitting- downregulating strokes.   Neuromuscular Re-education     Therapeutic Activity     Therapeutic Exercises 10 UBE warmup X 3 min. Review and progression of HEP   Gait training     Modality__________________                Total 25    Blank areas are intentional and mean the treatment did not include these items.       Nikolai MENDIETA  2018

## 2021-06-22 NOTE — PROGRESS NOTES
CHART NOTE     DATE OF SERVICE:  12/3/2018     : 1975   43 y.o.     ASSESSMENT :   Overall doing well with symptom improvement.  Post op hoarseness and swallowing difficulty.      PLAN: Wean from collar/brace. Loosen restrictions. Refer to ENT- I spoke with [facility] re: expediting appt. She will see Dr. Palacios tomorrow morning.  RTC prn. Enc to call with any new questions or concerns.     HPI:  Ellie Echols is status post CERVICAL 6-7 MOBI C DISC ARTHROPLASTY on 18 by Dr. Schofield.  Presented with numbness and pain in her left hand.     TODAY, patient reports numbness and pain resolved in L hand until she had PT on Friday. Now a bit more achy across L shoulder and down her L arm.  Biggest concern is voice hoarseness which has been present since surgery and also has trouble swallowing thin liquids.  She has to tuck her chin forward to drink and that works, does often have coughing fits if she is not careful. No shortness of breath or chest pain. Has been given referral to ENT on 2018 but has not heard from them as yet. Off from her job as .           PAST MEDICAL HISTORY, SURGICAL HISTORY, REVIEW OF SYMPTOMS, MEDICATIONS AND ALLERGIES:  Past medical history, surgical history, ROS, medications and allergies reviewed with patient and remain unchanged from previous visit, except as noted in HPI.     Past Medical History:   Diagnosis Date     Asthma      Bladder infection      Cervical cord compression with myelopathy (H)      Chronic back pain      Depression      Foraminal stenosis of cervical region      GERD (gastroesophageal reflux disease)      Hypertension      Internal hemorrhoids      Kidney stone      Migraine      Nephrolithiasis      Painful swelling of joint        Past Surgical History:   Procedure Laterality Date     CARPAL TUNNEL RELEASE Right       SECTION      five     LAMINOPLASTY Left 2018    Procedure: CERVICAL 4 - CERVICAL 7 OPEN LEFT LAMINOPLASTY ;   Surgeon: Yu Schofield MD;  Location: [facility] OR;  Service:      CA CYSTOURETHROSCOPY      Description: Cystoscopy (For Therapy);  Proc Date: 06/15/2012;  Comments: Performed at [facility]     TUBAL LIGATION       URETEROSCOPY         Current Outpatient Medications   Medication Sig Dispense Refill     acetaminophen (TYLENOL) 325 MG tablet Take 2 tablets (650 mg total) by mouth every 6 (six) hours as needed for pain. (Patient taking differently: Take 500 mg by mouth every 6 (six) hours as needed for pain .      )  0     albuterol (VENTOLIN HFA) 90 mcg/actuation inhaler Inhale 2 puffs every 6 (six) hours as needed for wheezing. 18 g 5     citalopram (CELEXA) 20 MG tablet TAKE 1 TABLET BY MOUTH EVERY DAY 90 tablet 5     cyclobenzaprine (FLEXERIL) 5 MG tablet Take 1 tablet (5 mg total) by mouth 3 (three) times a day as needed for muscle spasms. 30 tablet 0     diazePAM (VALIUM) 5 MG tablet Take 1 tablet (5 mg total) by mouth 3 (three) times a day as needed for muscle spasms. 20 tablet 0     dicyclomine (BENTYL) 20 mg tablet Take 20 mg by mouth every 6 (six) hours as needed (irritable bowel syndrome).        EPINEPHrine (AUVI-Q) 0.3 mg/0.3 mL injection Inject into thigh for allergic reaction 2 Pre-filled Pen Syringe 0     L. ACIDOPHILUS/BIFID. ANIMALIS (DAILY PROBIOTIC ORAL) Take 1 tablet by mouth daily.       losartan-hydrochlorothiazide (HYZAAR) 100-25 mg per tablet TAKE 1 TABLET BY MOUTH DAILY. 90 tablet 5     MULTIVIT-MINERALS/FERROUS FUM (MULTI VITAMIN ORAL) Take 1 tablet by mouth daily.       omega-3/dha/epa/fish oil (FISH OIL-OMEGA-3 FATTY ACIDS) 300-1,000 mg capsule May resume in one week  0     omeprazole (PRILOSEC) 20 MG capsule Take 20 mg by mouth 2 (two) times a day before meals.        oxyCODONE (ROXICODONE) 5 MG immediate release tablet Take 1-2 tablets (5-10 mg total) by mouth every 4 (four) hours as needed for pain. 30 tablet 0     No current facility-administered medications for this visit.   "      PHYSICAL EXAM:    /80   Pulse 75   Ht 5' 3\" (1.6 m)   Wt 134 lb (60.8 kg)   SpO2 99%   BMI 23.74 kg/m        Neurological exam reveals:  Respirations easy, non-labored.   Skin: W/D/I. No rashes, lesions or breaks in integrity.   Recent and remote memory intact, fund of knowledge wnl.    Alert and oriented x3, speech fluent and appropriate.   PERRL, EOMI, No nystagmus,   Face symmetric, tongue midline, Uvula midline,  palate rises with phonation   Shoulder shrug equal  Arm strength bilateral grasp, biceps, triceps, and deltoids 5/5   No extremity edema noted.   Muscle Bulk and tone wnl.   Reflexes: No pathological reflexes   Gait and station:Normal  Incision: CDI without erythema or edema,   NDI/LAMAR:      RADIOGRAPHIC IMAGING: XR:       Films personally reviewed and interpreted.    Reviewed imaging with patient and family.          "

## 2021-06-22 NOTE — PROGRESS NOTES
[facility] Daily Progress     Patient Name: Ellie Echols  Date: 1/9/2019  Visit #: 5  PTA visit #:  -  Referral Diagnosis:   Cervical nerve root compression [G54.2]  - Primary   Mobi-C disc replacement C6-7 on 11/5/18  Referring provider: Yu Schofield MD  Visit Diagnosis:     ICD-10-CM    1. Neck pain M54.2    2. Neck stiffness M43.6    3. Neck muscle weakness M53.82    4. Decreased ROM of neck R29.898      Ellie Echols is a 43 y.o. female who presents to therapy today with chief complaints of bilateral neck tightness, stiffness, and mild pain following C6-7 Mobi-C disc replacement on 11/5/18. The patient reports resolution of nerve pain with this, but now has some stiffness and tightness in the neck and shoulders, as well as some fatigue and discomfort with looking down. With examination, the patient demonstrates decreased cervical ROM, increased tension in cervical musculature, and mild + median nerve tension testing. The patient had normal myotome/dermatome screens. She would likely benefit from skilled PT to improve her cervical strength, ROM/stiffness, pain, and general function. Will initiate MEDX when patient is 6-8 weeks out from surgical date.    Assessment:     HEP/POC compliance is  good .     Patient showing improvements in cervical ROM and decreased neck tenderness with STM today. We did MEDX today and the patient shows below average strength as expected being 6 weeks post op. She did tolerate this well and would benefit from strengthening on MEDX to help with return to full function. We did progress light cervical and scapular strengthening for her HEP as well. The patient is appropriate to continue with skilled PT per POC.    Goal Status:  Pt. will be independent with home exercise program in : 4 weeks  Pt. will report decreased intensity, frequency of : in 4 weeks;Comment  Comment:: 50%    Patient will decrease : NDI score;for improved quality of function;in 6 weeks;by _  points  by ___ points: 10%  Pt will: be able to return to running without increased pain in 10 weeks:  Pt will: be able to drive for > 30 minutes without increased pain in 8 weeks:      Plan / Patient Education:     Continue with initial plan of care.     Plan for next visit: review HEP, continue STM and TASTM, scapular strengthening.     Subjective:     Pain Ratin     Voice is improving, she has been more sore, carrying backpack at [place] made her more sore. She is feeling some pain in the left arm, but has not been doing exercises.     Objective:     Increased tension to B levators.  Decreased tension and pain following MT.    Progressed HEP with quadruped strengthening and I/T's.    MED X Testing Cervical Initial 18 4 week re-test - 8 week re-test -   AROM (full ROM 0-126) 18-84     Max Torque  162#     Flex/ext Ratio (ideal 1.4:1) 1.46:1         Cervical ROM:    Date: 18     *Indicate scale AROM AROM AROM   Cervical Flexion 30 deg 55 deg    Cervical Extension 60 deg 55 deg     Right Left Right Left Right Left   Cervical Sidebending 20 deg 20 deg 30 deg 28 deg     Cervical Rotation 45 deg 60 deg 60 deg 72 deg     Thoracic Rotation             Treatment Today     TREATMENT MINUTES COMMENTS   Evaluation     Self-care/ Home management     Manual therapy 10 STM and MFR in supine to cervical paraspinals, suboccipital, UT, levator.    Neuromuscular Re-education     Therapeutic Activity     Therapeutic Exercises 16 UBE warmup X 3 min. Review and progression of HEP MEDX IM and DE   Gait training     Modality__________________                Total 26    Blank areas are intentional and mean the treatment did not include these items.       Nikolai MENDIETA  2019

## 2021-06-22 NOTE — PROGRESS NOTES
[facility] Daily Progress     Patient Name: Ellie Echols  Date: 12/19/2018  Visit #: 3  PTA visit #:  -  Referral Diagnosis:   Cervical nerve root compression [G54.2]  - Primary   Mobi-C disc replacement C6-7 on 11/5/18  Referring provider: Yu Schofield MD  Visit Diagnosis:     ICD-10-CM    1. Neck pain M54.2    2. Neck stiffness M43.6    3. Neck muscle weakness M53.82    4. Decreased ROM of neck R29.898      Ellie Echols is a 43 y.o. female who presents to therapy today with chief complaints of bilateral neck tightness, stiffness, and mild pain following C6-7 Mobi-C disc replacement on 11/5/18. The patient reports resolution of nerve pain with this, but now has some stiffness and tightness in the neck and shoulders, as well as some fatigue and discomfort with looking down. With examination, the patient demonstrates decreased cervical ROM, increased tension in cervical musculature, and mild + median nerve tension testing. The patient had normal myotome/dermatome screens. She would likely benefit from skilled PT to improve her cervical strength, ROM/stiffness, pain, and general function. Will initiate MEDX when patient is 6-8 weeks out from surgical date.    Assessment:     HEP/POC compliance is  good .     Patient showing improvements in cervical ROM and decreased neck tenderness with STM today. We did initiate MEDX today and the patient shows below average strength as expected being 6 weeks post op. She did tolerate this well and would benefit from strengthening on MEDX to help with return to full function. We did initiate light cervical and scapular strengthening for her HEP as well. The patient is appropriate to continue with skilled PT per POC.    Goal Status:  Pt. will be independent with home exercise program in : 4 weeks  Pt. will report decreased intensity, frequency of : in 4 weeks;Comment  Comment:: 50%    Patient will decrease : NDI score;for improved quality of function;in 6  weeks;by _ points  by ___ points: 10%  Pt will: be able to return to running without increased pain in 10 weeks:  Pt will: be able to drive for > 30 minutes without increased pain in 8 weeks:      Plan / Patient Education:     Continue with initial plan of care.     Plan for next visit: review HEP, continue STM and TASTM, scapular strengthening.     Subjective:     Pain Ratin     Has done some light running which has gone well. No pain in the arm, minimal in the neck.     Objective:     Increased tension to left sided cervical paraspinals- no increased pain.    Imroved with STM and TPR.  Continued hoarseness in her voice.    Trial of pilates reformer rows     MED X Testing Cervical Initial 18 4 week re-test - 8 week re-test -   AROM (full ROM 0-126) 18-84     Max Torque  162#     Flex/ext Ratio (ideal 1.4:1) 1.46:1         Cervical ROM:    Date: 18     *Indicate scale AROM AROM AROM   Cervical Flexion 30 deg 55 deg    Cervical Extension 60 deg 55 deg     Right Left Right Left Right Left   Cervical Sidebending 20 deg 20 deg 30 deg 28 deg     Cervical Rotation 45 deg 60 deg 60 deg 72 deg     Thoracic Rotation             Treatment Today     TREATMENT MINUTES COMMENTS   Evaluation     Self-care/ Home management     Manual therapy 10 STM and MFR in supine to cervical paraspinals, suboccipital, UT, levator.    Neuromuscular Re-education     Therapeutic Activity     Therapeutic Exercises 16 UBE warmup X 3 min. Review and progression of HEP MEDX IM and DE   Gait training     Modality__________________                Total 26    Blank areas are intentional and mean the treatment did not include these items.       Nikolai MENDIETA  2018

## 2021-06-23 NOTE — PROGRESS NOTES
[facility] Daily Progress     Patient Name: Ellie Echols  Date: 1/30/2019  Visit #: 7  PTA visit #:  -  Referral Diagnosis:   Cervical nerve root compression [G54.2]  - Primary   Mobi-C disc replacement C6-7 on 11/5/18  Referring provider: Yu Schofield MD  Visit Diagnosis:     ICD-10-CM    1. Neck pain M54.2    2. Neck stiffness M43.6    3. Neck muscle weakness M53.82    4. Decreased ROM of neck R29.898      Ellie Echols is a 43 y.o. female who presents to therapy today with chief complaints of bilateral neck tightness, stiffness, and mild pain following C6-7 Mobi-C disc replacement on 11/5/18. The patient reports resolution of nerve pain with this, but now has some stiffness and tightness in the neck and shoulders, as well as some fatigue and discomfort with looking down. With examination, the patient demonstrates decreased cervical ROM, increased tension in cervical musculature, and mild + median nerve tension testing. The patient had normal myotome/dermatome screens. She would likely benefit from skilled PT to improve her cervical strength, ROM/stiffness, pain, and general function. Will initiate MEDX when patient is 6-8 weeks out from surgical date.    Assessment:     HEP/POC compliance is  good .     Patient returns to PT with increase in symptoms on the right side. The patient has normal myotomes, but does have + neural tension testing. No change with light distraction (avoiding strong distraction due to disc replacement). Pain does seem most consistent with a cervical radiculitis.     Prior to this patient showing improvements in cervical ROM and decreased neck tenderness with STM. We did MEDX and the patient shows below average strength as expected being 6 weeks post op. She did tolerate this well and would benefit from strengthening on MEDX to help with return to full function. We did progress light cervical and scapular strengthening for her HEP as well. The patient is  appropriate to continue with skilled PT per POC.    Goal Status:  Pt. will be independent with home exercise program in : 4 weeks  Pt. will report decreased intensity, frequency of : in 4 weeks;Comment  Comment:: 50%    Patient will decrease : NDI score;for improved quality of function;in 6 weeks;by _ points  by ___ points: 10%  Pt will: be able to return to running without increased pain in 10 weeks:  Pt will: be able to drive for > 30 minutes without increased pain in 8 weeks:      Plan / Patient Education:     Continue with initial plan of care.     Plan for next visit: review HEP, continue STM and TASTM, scapular strengthening. Resume MEDX if pain resolved.    Subjective:     Things on the left side have been good. On Friday started getting the similar nerve pain but on the right side. Kind of a burning pain. Noticed it leaning forwards and resting head on arm. Since then pain has continued to persist. Hurts to drive,   No pain going down in to the hand.    Objective:     Education on neve mobilizations today  Held MEDX due to increased pain.    UE myotomes- 5 throughout   strength 45/47.5 (left hand dominant)    MED X Testing Cervical Initial 12/19/18 4 week re-test - 8 week re-test -   AROM (full ROM 0-126) 18-84     Max Torque  162#     Flex/ext Ratio (ideal 1.4:1) 1.46:1       Cervical ROM:    Date: 11/23/18 1/30/18   *Indicate scale AROM AROM AROM   Cervical Flexion 30 deg 55 deg 50    Cervical Extension 60 deg 55 deg 50     Right Left Right Left Right Left   Cervical Sidebending 20 deg 20 deg 30 deg 28 deg 25 pain 30    Cervical Rotation 45 deg 60 deg 60 deg 72 deg 60  pain60   Thoracic Rotation           Treatment Today     TREATMENT MINUTES COMMENTS   Evaluation     Self-care/ Home management     Manual therapy 25 STM and MFR in supine to cervical paraspinals, suboccipital, UT, levator.  Gentle cervical distraction, passive median neuromobilizations.   Neuromuscular Re-education     Therapeutic  Activity     Therapeutic Exercises 5 UBE warmup X 3 min. Review and progression of HEP MEDX and DE  (MEDX held today)   Gait training     Modality__________________                Total 30    Blank areas are intentional and mean the treatment did not include these items.       Nikolai MENDIETA  1/30/2019

## 2021-06-23 NOTE — PROGRESS NOTES
[facility] Daily Progress     Patient Name: Ellie Echols  Date: 1/16/2019  Visit #: 6  PTA visit #:  -  Referral Diagnosis:   Cervical nerve root compression [G54.2]  - Primary   Mobi-C disc replacement C6-7 on 11/5/18  Referring provider: Yu Schofield MD  Visit Diagnosis:     ICD-10-CM    1. Neck pain M54.2    2. Neck stiffness M43.6    3. Neck muscle weakness M53.82    4. Decreased ROM of neck R29.898      Ellie Echols is a 43 y.o. female who presents to therapy today with chief complaints of bilateral neck tightness, stiffness, and mild pain following C6-7 Mobi-C disc replacement on 11/5/18. The patient reports resolution of nerve pain with this, but now has some stiffness and tightness in the neck and shoulders, as well as some fatigue and discomfort with looking down. With examination, the patient demonstrates decreased cervical ROM, increased tension in cervical musculature, and mild + median nerve tension testing. The patient had normal myotome/dermatome screens. She would likely benefit from skilled PT to improve her cervical strength, ROM/stiffness, pain, and general function. Will initiate MEDX when patient is 6-8 weeks out from surgical date.    Assessment:     HEP/POC compliance is  good .     Patient showing improvements in cervical ROM and decreased neck tenderness with STM today. We did MEDX and the patient shows below average strength as expected being 6 weeks post op. She did tolerate this well and would benefit from strengthening on MEDX to help with return to full function. We did progress light cervical and scapular strengthening for her HEP as well. The patient is appropriate to continue with skilled PT per POC.    Goal Status:  Pt. will be independent with home exercise program in : 4 weeks  Pt. will report decreased intensity, frequency of : in 4 weeks;Comment  Comment:: 50%    Patient will decrease : NDI score;for improved quality of function;in 6 weeks;by _  points  by ___ points: 10%  Pt will: be able to return to running without increased pain in 10 weeks:  Pt will: be able to drive for > 30 minutes without increased pain in 8 weeks:      Plan / Patient Education:     Continue with initial plan of care.     Plan for next visit: review HEP, continue STM and TASTM, scapular strengthening.     Subjective:     Pain Ratin     Arm is improving, neck feels really good. Had a lot less pain with looking down. Has been doing some running which is going well.     Objective:     Education on scalene stretch.   Addition of 4 way neck.   Tolerated well.     MED X Testing Cervical Initial 18 4 week re-test - 8 week re-test -   AROM (full ROM 0-126) 18-84     Max Torque  162#     Flex/ext Ratio (ideal 1.4:1) 1.46:1         Cervical ROM:    Date: 18     *Indicate scale AROM AROM AROM   Cervical Flexion 30 deg 55 deg    Cervical Extension 60 deg 55 deg     Right Left Right Left Right Left   Cervical Sidebending 20 deg 20 deg 30 deg 28 deg     Cervical Rotation 45 deg 60 deg 60 deg 72 deg     Thoracic Rotation           Treatment Today     TREATMENT MINUTES COMMENTS   Evaluation     Self-care/ Home management     Manual therapy 10 STM and MFR in supine to cervical paraspinals, suboccipital, UT, levator.    Neuromuscular Re-education     Therapeutic Activity     Therapeutic Exercises 17 UBE warmup X 3 min. Review and progression of HEP MEDX and DE   Gait training     Modality__________________                Total 27    Blank areas are intentional and mean the treatment did not include these items.       Nikolai MENDIETA  2019

## 2021-07-03 NOTE — ADDENDUM NOTE
Addendum Note by Sarbjit Olsen MD at 9/12/2019  2:40 PM     Author: Sarbjit Olsen MD Service: -- Author Type: Physician    Filed: 9/13/2019 12:25 PM Encounter Date: 9/12/2019 Status: Signed    : Sarbjit Olsen MD (Physician)    Addended by: SARBJIT OLSEN on: 9/13/2019 12:25 PM        Modules accepted: Orders

## 2021-10-11 ENCOUNTER — HEALTH MAINTENANCE LETTER (OUTPATIENT)
Age: 46
End: 2021-10-11

## 2021-12-05 ENCOUNTER — HEALTH MAINTENANCE LETTER (OUTPATIENT)
Age: 46
End: 2021-12-05

## 2022-01-13 DIAGNOSIS — I10 ESSENTIAL HYPERTENSION: ICD-10-CM

## 2022-01-16 RX ORDER — TRIAMTERENE/HYDROCHLOROTHIAZID 37.5-25 MG
TABLET ORAL
Qty: 90 TABLET | Refills: 3 | Status: SHIPPED | OUTPATIENT
Start: 2022-01-16

## 2022-01-16 NOTE — TELEPHONE ENCOUNTER
"Routing refill request to provider for review/approval because:  Labs not current:  Multiple  BP not current    Last Written Prescription Date:  10/28/20  Last Fill Quantity: 90,  # refills: 3   Last office visit provider:  9/26/2019     Requested Prescriptions   Pending Prescriptions Disp Refills     triamterene-HCTZ (MAXZIDE-25) 37.5-25 MG tablet [Pharmacy Med Name: TRIAMTERENE-HCTZ 37.5-25 MG TB] 90 tablet 3     Sig: TAKE 1 TABLET BY MOUTH EVERY DAY       Diuretics (Including Combos) Protocol Failed - 1/13/2022 12:36 AM        Failed - Blood pressure under 140/90 in past 12 months     BP Readings from Last 3 Encounters:   07/03/20 124/68   09/26/19 120/78   09/12/19 102/80                 Failed - Recent (12 mo) or future (30 days) visit within the authorizing provider's specialty     Patient has had an office visit with the authorizing provider or a provider within the authorizing providers department within the previous 12 mos or has a future within next 30 days. See \"Patient Info\" tab in inbasket, or \"Choose Columns\" in Meds & Orders section of the refill encounter.              Failed - Normal serum creatinine on file in past 12 months     Recent Labs   Lab Test 07/03/20  1001   CR 0.80              Failed - Normal serum potassium on file in past 12 months     Recent Labs   Lab Test 07/03/20  1001   POTASSIUM 3.8                    Failed - Normal serum sodium on file in past 12 months     Recent Labs   Lab Test 07/03/20  1001                 Passed - Medication is active on med list        Passed - Patient is age 18 or older        Passed - No active pregancy on record        Passed - No positive pregnancy test in past 12 months             Adi Powell RN 01/16/22 8:43 AM  "

## 2022-07-17 ENCOUNTER — HEALTH MAINTENANCE LETTER (OUTPATIENT)
Age: 47
End: 2022-07-17

## 2022-09-25 ENCOUNTER — HEALTH MAINTENANCE LETTER (OUTPATIENT)
Age: 47
End: 2022-09-25

## 2023-01-30 ENCOUNTER — HEALTH MAINTENANCE LETTER (OUTPATIENT)
Age: 48
End: 2023-01-30

## 2023-08-05 ENCOUNTER — HEALTH MAINTENANCE LETTER (OUTPATIENT)
Age: 48
End: 2023-08-05

## 2025-05-03 ENCOUNTER — HOSPITAL ENCOUNTER (OUTPATIENT)
Facility: CLINIC | Age: 50
Setting detail: OBSERVATION
Discharge: PSYCHIATRIC OR SUBSTANCE USE FACILITY | End: 2025-05-04
Attending: EMERGENCY MEDICINE | Admitting: EMERGENCY MEDICINE
Payer: COMMERCIAL

## 2025-05-03 ENCOUNTER — TELEPHONE (OUTPATIENT)
Dept: BEHAVIORAL HEALTH | Facility: CLINIC | Age: 50
End: 2025-05-03

## 2025-05-03 VITALS
HEART RATE: 82 BPM | OXYGEN SATURATION: 96 % | SYSTOLIC BLOOD PRESSURE: 122 MMHG | WEIGHT: 130.8 LBS | HEIGHT: 62 IN | BODY MASS INDEX: 24.07 KG/M2 | RESPIRATION RATE: 18 BRPM | DIASTOLIC BLOOD PRESSURE: 85 MMHG | TEMPERATURE: 98.4 F

## 2025-05-03 DIAGNOSIS — F10.90 ALCOHOL USE DISORDER: ICD-10-CM

## 2025-05-03 DIAGNOSIS — F50.9 EATING DISORDER, UNSPECIFIED TYPE: ICD-10-CM

## 2025-05-03 DIAGNOSIS — R44.0 AUDITORY HALLUCINATION: ICD-10-CM

## 2025-05-03 DIAGNOSIS — F43.10 PTSD (POST-TRAUMATIC STRESS DISORDER): ICD-10-CM

## 2025-05-03 DIAGNOSIS — F33.3 SEVERE EPISODE OF RECURRENT MAJOR DEPRESSIVE DISORDER, WITH PSYCHOTIC FEATURES (H): ICD-10-CM

## 2025-05-03 DIAGNOSIS — R44.1 VISUAL HALLUCINATIONS: ICD-10-CM

## 2025-05-03 PROBLEM — R45.851 SUICIDAL IDEATION: Status: ACTIVE | Noted: 2025-05-03

## 2025-05-03 LAB
ALBUMIN SERPL BCG-MCNC: 4.3 G/DL (ref 3.5–5.2)
ALBUMIN UR-MCNC: NEGATIVE MG/DL
ALP SERPL-CCNC: 34 U/L (ref 40–150)
ALT SERPL W P-5'-P-CCNC: 12 U/L (ref 0–50)
AMPHETAMINES UR QL SCN: NORMAL
ANION GAP SERPL CALCULATED.3IONS-SCNC: 10 MMOL/L (ref 7–15)
APPEARANCE UR: CLEAR
AST SERPL W P-5'-P-CCNC: 18 U/L (ref 0–45)
BARBITURATES UR QL SCN: NORMAL
BASOPHILS # BLD AUTO: 0 10E3/UL (ref 0–0.2)
BASOPHILS NFR BLD AUTO: 1 %
BENZODIAZ UR QL SCN: NORMAL
BILIRUB SERPL-MCNC: 0.3 MG/DL
BILIRUB UR QL STRIP: NEGATIVE
BUN SERPL-MCNC: 6.6 MG/DL (ref 6–20)
BZE UR QL SCN: NORMAL
CALCIUM SERPL-MCNC: 9.6 MG/DL (ref 8.8–10.4)
CANNABINOIDS UR QL SCN: NORMAL
CHLORIDE SERPL-SCNC: 100 MMOL/L (ref 98–107)
COLOR UR AUTO: ABNORMAL
CREAT SERPL-MCNC: 0.76 MG/DL (ref 0.51–0.95)
EGFRCR SERPLBLD CKD-EPI 2021: >90 ML/MIN/1.73M2
EOSINOPHIL # BLD AUTO: 0.1 10E3/UL (ref 0–0.7)
EOSINOPHIL NFR BLD AUTO: 4 %
ERYTHROCYTE [DISTWIDTH] IN BLOOD BY AUTOMATED COUNT: 12.9 % (ref 10–15)
FENTANYL UR QL: NORMAL
GLUCOSE SERPL-MCNC: 82 MG/DL (ref 70–99)
GLUCOSE UR STRIP-MCNC: NEGATIVE MG/DL
HCG UR QL: NEGATIVE
HCO3 SERPL-SCNC: 28 MMOL/L (ref 22–29)
HCT VFR BLD AUTO: 36 % (ref 35–47)
HGB BLD-MCNC: 11.5 G/DL (ref 11.7–15.7)
HGB UR QL STRIP: NEGATIVE
IMM GRANULOCYTES # BLD: 0 10E3/UL
IMM GRANULOCYTES NFR BLD: 0 %
KETONES UR STRIP-MCNC: NEGATIVE MG/DL
LEUKOCYTE ESTERASE UR QL STRIP: NEGATIVE
LYMPHOCYTES # BLD AUTO: 1.6 10E3/UL (ref 0.8–5.3)
LYMPHOCYTES NFR BLD AUTO: 42 %
MCH RBC QN AUTO: 29 PG (ref 26.5–33)
MCHC RBC AUTO-ENTMCNC: 31.9 G/DL (ref 31.5–36.5)
MCV RBC AUTO: 91 FL (ref 78–100)
MONOCYTES # BLD AUTO: 0.3 10E3/UL (ref 0–1.3)
MONOCYTES NFR BLD AUTO: 9 %
NEUTROPHILS # BLD AUTO: 1.8 10E3/UL (ref 1.6–8.3)
NEUTROPHILS NFR BLD AUTO: 45 %
NITRATE UR QL: NEGATIVE
NRBC # BLD AUTO: 0 10E3/UL
NRBC BLD AUTO-RTO: 0 /100
OPIATES UR QL SCN: NORMAL
PCP QUAL URINE (ROCHE): NORMAL
PH UR STRIP: 7.5 [PH] (ref 5–7)
PLATELET # BLD AUTO: 309 10E3/UL (ref 150–450)
POTASSIUM SERPL-SCNC: 3.3 MMOL/L (ref 3.4–5.3)
PROT SERPL-MCNC: 6.9 G/DL (ref 6.4–8.3)
RBC # BLD AUTO: 3.96 10E6/UL (ref 3.8–5.2)
RBC URINE: <1 /HPF
SODIUM SERPL-SCNC: 138 MMOL/L (ref 135–145)
SP GR UR STRIP: 1.01 (ref 1–1.03)
SQUAMOUS EPITHELIAL: <1 /HPF
UROBILINOGEN UR STRIP-MCNC: NORMAL MG/DL
WBC # BLD AUTO: 3.9 10E3/UL (ref 4–11)
WBC URINE: <1 /HPF

## 2025-05-03 PROCEDURE — 250N000013 HC RX MED GY IP 250 OP 250 PS 637

## 2025-05-03 PROCEDURE — 80053 COMPREHEN METABOLIC PANEL: CPT

## 2025-05-03 PROCEDURE — 81001 URINALYSIS AUTO W/SCOPE: CPT

## 2025-05-03 PROCEDURE — 99223 1ST HOSP IP/OBS HIGH 75: CPT

## 2025-05-03 PROCEDURE — 80307 DRUG TEST PRSMV CHEM ANLYZR: CPT

## 2025-05-03 PROCEDURE — 36415 COLL VENOUS BLD VENIPUNCTURE: CPT

## 2025-05-03 PROCEDURE — G0378 HOSPITAL OBSERVATION PER HR: HCPCS

## 2025-05-03 PROCEDURE — 81025 URINE PREGNANCY TEST: CPT

## 2025-05-03 PROCEDURE — 99285 EMERGENCY DEPT VISIT HI MDM: CPT

## 2025-05-03 PROCEDURE — 85025 COMPLETE CBC W/AUTO DIFF WBC: CPT

## 2025-05-03 RX ORDER — TRIAMTERENE AND HYDROCHLOROTHIAZIDE 37.5; 25 MG/1; MG/1
1 TABLET ORAL DAILY
Status: DISCONTINUED | OUTPATIENT
Start: 2025-05-04 | End: 2025-05-04 | Stop reason: HOSPADM

## 2025-05-03 RX ORDER — PANTOPRAZOLE SODIUM 40 MG/1
40 TABLET, DELAYED RELEASE ORAL DAILY
COMMUNITY
Start: 2024-09-03

## 2025-05-03 RX ORDER — BUPROPION HYDROCHLORIDE 300 MG/1
300 TABLET ORAL EVERY MORNING
COMMUNITY
Start: 2025-03-29

## 2025-05-03 RX ORDER — VITAMIN B COMPLEX
2000 TABLET ORAL DAILY
Status: DISCONTINUED | OUTPATIENT
Start: 2025-05-04 | End: 2025-05-04 | Stop reason: HOSPADM

## 2025-05-03 RX ORDER — BUPROPION HYDROCHLORIDE 300 MG/1
300 TABLET ORAL EVERY MORNING
Status: DISCONTINUED | OUTPATIENT
Start: 2025-05-04 | End: 2025-05-04 | Stop reason: HOSPADM

## 2025-05-03 RX ORDER — TRAZODONE HYDROCHLORIDE 50 MG/1
50 TABLET ORAL AT BEDTIME
COMMUNITY

## 2025-05-03 RX ORDER — HYDROXYZINE HYDROCHLORIDE 25 MG/1
25 TABLET, FILM COATED ORAL EVERY 4 HOURS PRN
Status: DISCONTINUED | OUTPATIENT
Start: 2025-05-03 | End: 2025-05-04 | Stop reason: HOSPADM

## 2025-05-03 RX ORDER — TRAZODONE HYDROCHLORIDE 50 MG/1
50 TABLET ORAL AT BEDTIME
Status: DISCONTINUED | OUTPATIENT
Start: 2025-05-03 | End: 2025-05-04 | Stop reason: HOSPADM

## 2025-05-03 RX ORDER — DULOXETIN HYDROCHLORIDE 30 MG/1
30 CAPSULE, DELAYED RELEASE ORAL DAILY
COMMUNITY

## 2025-05-03 RX ORDER — CETIRIZINE HYDROCHLORIDE 10 MG/1
10 TABLET ORAL DAILY
Status: DISCONTINUED | OUTPATIENT
Start: 2025-05-04 | End: 2025-05-04 | Stop reason: HOSPADM

## 2025-05-03 RX ORDER — HYDROXYZINE HYDROCHLORIDE 25 MG/1
25 TABLET, FILM COATED ORAL EVERY 6 HOURS PRN
COMMUNITY
Start: 2024-09-03 | End: 2025-09-04

## 2025-05-03 RX ORDER — NALTREXONE HYDROCHLORIDE 50 MG/1
50 TABLET, FILM COATED ORAL DAILY
Status: DISCONTINUED | OUTPATIENT
Start: 2025-05-04 | End: 2025-05-04 | Stop reason: HOSPADM

## 2025-05-03 RX ORDER — NALTREXONE HYDROCHLORIDE 50 MG/1
50 TABLET, FILM COATED ORAL DAILY
COMMUNITY
Start: 2025-05-02

## 2025-05-03 RX ORDER — LOSARTAN POTASSIUM 50 MG/1
100 TABLET ORAL DAILY
Status: DISCONTINUED | OUTPATIENT
Start: 2025-05-04 | End: 2025-05-03

## 2025-05-03 RX ORDER — DULOXETIN HYDROCHLORIDE 60 MG/1
60 CAPSULE, DELAYED RELEASE ORAL DAILY
COMMUNITY
Start: 2024-09-03

## 2025-05-03 RX ORDER — PANTOPRAZOLE SODIUM 40 MG/1
40 TABLET, DELAYED RELEASE ORAL DAILY
Status: DISCONTINUED | OUTPATIENT
Start: 2025-05-04 | End: 2025-05-04 | Stop reason: HOSPADM

## 2025-05-03 RX ORDER — ACETAMINOPHEN 325 MG/1
650 TABLET ORAL EVERY 4 HOURS PRN
Status: DISCONTINUED | OUTPATIENT
Start: 2025-05-03 | End: 2025-05-04 | Stop reason: HOSPADM

## 2025-05-03 RX ORDER — RISPERIDONE 1 MG/1
2 TABLET ORAL AT BEDTIME
COMMUNITY
Start: 2025-04-24

## 2025-05-03 RX ORDER — LITHIUM CARBONATE 300 MG/1
600 CAPSULE ORAL AT BEDTIME
Status: DISCONTINUED | OUTPATIENT
Start: 2025-05-03 | End: 2025-05-04 | Stop reason: HOSPADM

## 2025-05-03 RX ORDER — LOSARTAN POTASSIUM 50 MG/1
50 TABLET ORAL DAILY
Status: DISCONTINUED | OUTPATIENT
Start: 2025-05-04 | End: 2025-05-04 | Stop reason: HOSPADM

## 2025-05-03 RX ORDER — LORAZEPAM 0.5 MG/1
0.5 TABLET ORAL EVERY 6 HOURS PRN
COMMUNITY

## 2025-05-03 RX ORDER — LITHIUM CARBONATE 300 MG
600 TABLET ORAL AT BEDTIME
COMMUNITY
Start: 2024-09-03

## 2025-05-03 RX ORDER — OLANZAPINE 5 MG/1
5-10 TABLET, ORALLY DISINTEGRATING ORAL 3 TIMES DAILY PRN
Status: DISCONTINUED | OUTPATIENT
Start: 2025-05-03 | End: 2025-05-04 | Stop reason: HOSPADM

## 2025-05-03 RX ADMIN — LITHIUM CARBONATE 600 MG: 300 CAPSULE, GELATIN COATED ORAL at 20:35

## 2025-05-03 RX ADMIN — HYDROXYZINE HYDROCHLORIDE 25 MG: 25 TABLET, FILM COATED ORAL at 19:25

## 2025-05-03 RX ADMIN — TRAZODONE HYDROCHLORIDE 50 MG: 50 TABLET ORAL at 20:35

## 2025-05-03 RX ADMIN — RISPERIDONE 3 MG: 2 TABLET, FILM COATED ORAL at 20:35

## 2025-05-03 ASSESSMENT — COLUMBIA-SUICIDE SEVERITY RATING SCALE - C-SSRS
4. HAVE YOU HAD THESE THOUGHTS AND HAD SOME INTENTION OF ACTING ON THEM?: YES
6. HAVE YOU EVER DONE ANYTHING, STARTED TO DO ANYTHING, OR PREPARED TO DO ANYTHING TO END YOUR LIFE?: NO
3. HAVE YOU BEEN THINKING ABOUT HOW YOU MIGHT KILL YOURSELF?: YES
2. HAVE YOU ACTUALLY HAD ANY THOUGHTS OF KILLING YOURSELF IN THE PAST MONTH?: YES
5. HAVE YOU STARTED TO WORK OUT OR WORKED OUT THE DETAILS OF HOW TO KILL YOURSELF? DO YOU INTEND TO CARRY OUT THIS PLAN?: NO
1. IN THE PAST MONTH, HAVE YOU WISHED YOU WERE DEAD OR WISHED YOU COULD GO TO SLEEP AND NOT WAKE UP?: YES

## 2025-05-03 ASSESSMENT — ACTIVITIES OF DAILY LIVING (ADL)
ADLS_ACUITY_SCORE: 41
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
## 2025-05-03 NOTE — ED NOTES
"Pt is pleasant and cooperative and mostly slept in her recliner. Pt was forthcoming with the provider about having a suicidal plan to shoot herself with her handgun which she has access to. Pt lives in [place] which makes it difficult to file a report. Pt is currently in a day program, but feels like this hasn t been enough. Pt placed on IP waitlist and is voluntary. In the evening, pt reported feeling paranoid that someone is behind her watching her or is going to see her sleeping in the sensory room. Pt continues to endorse suicidal thoughts that are \"kind of always there\" and hallucinations. Pt contracts for safety here. Lithium level ordered for the morning.   "

## 2025-05-03 NOTE — ED PROVIDER NOTES
EmPATH Unit - Initial Psychiatric Observation Note  [facility] Emergency Department  Observation Initiation Date: May 3, 2025    Ellie Echols MRN: [id number]   Age: 49 year old YOB: 1975     History     Chief Complaint   Patient presents with    Psychiatric Evaluation     HPI  Ellie Echols is a 49 year old female with a past history notable for depression, anxiety, eating disorder, alcohol use disorder and PTSD who presented to the ED with concerns for suicidal ideation and worsening auditory and visual hallucinations. In the emergency department, this patient was determined to be medically stable and transferred to the [facility] unit for psychiatric assessment.  Record review indicates patient recently discharged from [facility] on 4/24/25 with inpatient admission prior to that 3/26-3/28/25. They have currently been in the emergency department for 7 hours.     I have reviewed the DEC assessment complete by ELVA Watkins dated 5/3/25.    On examination, patient endorses significant depressive symptoms including anhedonia and active suicidal ideation. She has a plan to shoot herself with a gun that she purchased a month ago. She has not disclosed this plan until today.  She states that she has hidden the gun from others.  Per consultation with [facility], when additional safety planning was attempted surrounding removal of the firearm, patient stated she would purchase another gun if needed.  She identifies her family and children as significant supports however no longer feels as though these are protective factors from stopping her from acting on her suicidal thoughts.  In addition to significant intensification of suicidal ideation, patient states that she has been experiencing auditory and visual hallucinations over the past month.  She describes auditory hallucinations as music and radio news casts that are often nondiscernible.  Visual hallucinations include faces disintegrating into  the floor and walls moving.  At times these are distressing however she is able to identify that these are not real.  She denies any command hallucinations.  She states during her most recent hospitalization she was started on risperidone which she feels as though has been helpful to decrease the intensity of the hallucinations however they have not resolved.  She denies HI.  She notes that she has been decreasing her alcohol intake and last drink on Thursday approximately a few drinks.  She denies any current cravings and denies any symptoms of withdrawal. She has attempted to coordinate with her primary psychiatric medication provider however has not been able to connect. She recently did an intake for [facility] however initiation of treatment was deferred noting continued eating disorder symptoms and alcohol use. Given current severity of depressive symptoms and feeling as though current programmatic care has not provided adequate stabilization, she is voluntary for inpatient stabilization.        Past Medical History  Past Medical History:   Diagnosis Date    Asthma     Bladder infection     Cervical cord compression with myelopathy (H)     Chronic back pain     Depression     Foraminal stenosis of cervical region     GERD (gastroesophageal reflux disease)     Hypertension     Internal hemorrhoids     Kidney stone     Migraine     Nephrolithiasis     Painful swelling of joint      Past Surgical History:   Procedure Laterality Date     SECTION      five    CYSTOURETHROSCOPY      Description: Cystoscopy (For Therapy);  Proc Date: 06/15/2012;  Comments: Performed at [facility]    LAMINOPLASTY Left 2018    Procedure: CERVICAL 4 - CERVICAL 7 OPEN LEFT LAMINOPLASTY ;  Surgeon: Yu Schofield MD;  Location: [facility];  Service:     RELEASE CARPAL TUNNEL Right     TUBAL LIGATION      URETEROSCOPY       albuterol (VENTOLIN HFA) 90 mcg/actuation inhaler  buPROPion (WELLBUTRIN XL) 300 MG 24 hr  "tablet  cetirizine (ZYRTEC) 10 MG tablet  cholecalciferol, vitamin D3, (VITAMIN D3) 2,000 unit Tab  DULoxetine (CYMBALTA) 60 MG capsule  hydrOXYzine HCl (ATARAX) 25 MG tablet  lithium (ESKALITH) 300 MG tablet  LORazepam (ATIVAN) 0.5 MG tablet  losartan (COZAAR) 100 MG tablet  naltrexone (DEPADE/REVIA) 50 MG tablet  pantoprazole (PROTONIX) 40 MG EC tablet  risperiDONE (RISPERDAL) 1 MG tablet  traZODone (DESYREL) 50 MG tablet  triamterene-HCTZ (MAXZIDE-25) 37.5-25 MG tablet  DULoxetine (CYMBALTA) 30 MG capsule  EPINEPHrine (AUVI-Q) 0.3 mg/0.3 mL injection  FLUoxetine (PROZAC) 40 MG capsule  MULTIVIT-MINERALS/FERROUS FUM (MULTI VITAMIN ORAL)      Allergies   Allergen Reactions    Minocycline Hives    Prochlorperazine Anxiety     Family History  Family History   Adopted: Yes     Social History   Social History     Tobacco Use    Smoking status: Former     Current packs/day: 0.00     Average packs/day: 0.3 packs/day for 1 year (0.3 ttl pk-yrs)     Types: Cigarettes     Start date: 2012     Quit date: 2013     Years since quittin.3    Smokeless tobacco: Never   Substance Use Topics    Alcohol use: Yes     Comment: Alcoholic Drinks/day: rare    Drug use: No          Review of Systems  A medically appropriate review of systems was performed with pertinent positives and negatives noted in the HPI, and all other systems negative.    Physical Examination   BP: 124/88  Pulse: 83  Temp: 98.5  F (36.9  C)  Resp: 19  Height: 157.5 cm (5' 2\")  Weight: 56.7 kg (125 lb)  SpO2: 99 %    Physical Exam  General: Appears stated age.   Neuro: Alert and fully oriented. Extremities appear to demonstrate normal strength on visual inspection.   Integumentary/Skin: no rash visualized, normal color    Psychiatric Examination   Appearance: awake, alert, adequately groomed, appeared as age stated, and casually dressed  Attitude:  cooperative  Eye Contact:  good  Mood:  depressed  Affect:  mood congruent and intensity is " blunted  Speech:  clear, coherent and normal prosody  Psychomotor Behavior:  no evidence of tardive dyskinesia, dystonia, or tics and intact station, gait and muscle tone  Thought Process:  logical and linear  Associations:  no loose associations  Thought Content:  active suicidal ideation present, plan for suicide present, auditory hallucinations present, visual hallucinations present, and denies HI  Insight:  fair  Judgement:  fair  Oriented to:  time, person, and place  Attention Span and Concentration:  fair  Recent and Remote Memory:  fair  Language: able to name/identify objects without impairment  Fund of Knowledge: intact with awareness of current and past events    ED Course     ED Course as of 05/03/25 1743   Sat May 03, 2025   1306 I obtained history and examined the patient as noted above.        Labs Ordered and Resulted from Time of ED Arrival to Time of ED Departure - No data to display    Assessments & Plan (with Medical Decision Making)   Patient presenting with worsening depression, suicidal ideation including plan/intent/means and auditory/visual hallucinations. Patient endorses purchasing a hand gun one month ago and current suicidal plan to utilize this to end her life. She has not disclosed this to anyone and is unable to engage in safety planning at time of assessment. Patient reports new onset of auditory and visual hallucinations over the past month which seem to coincide with worsening depressive symptoms. Treatment plan focused on further optimization of medications targeting depression and symptoms of psychosis while pursuing inpatient stabilization. Nursing notes reviewed noting no acute issues.     I have reviewed the assessment completed by the [facility].  Additional care coordination will be needed to further mitigate firearm risk given that patient noted firearm is located in [place] which does not have current ERPO reporting.  At time of assessment, patient is not willing to share  location of the firearm.    During the observation period, the patient did not require medications for agitation, and did not require restraints/seclusion for patient and/or provider safety.     The patient was found to have a psychiatric condition that would benefit from an observation stay in the emergency department for further psychiatric stabilization and/or coordination of a safe disposition. The observation plan includes serial assessments of psychiatric condition, potential administration of medications if indicated, further disposition pending the patient's psychiatric course during the monitoring period.     Preliminary diagnosis:    ICD-10-CM    1. Auditory hallucination  R44.0       2. Visual hallucinations  R44.1       3. PTSD (post-traumatic stress disorder)  F43.10       4. Severe episode of recurrent major depressive disorder, with psychotic features (H)  F33.3            Treatment Plan:  - Continue Wellbutrin  mg daily targeting anxiety and depression  - Continue Cymbalta 90 mg daily targeting depression  - Continue lithium 600 mg at bedtime for additional antidepressant augmentation; record review and notes considerations for tapering off of this given lack of perceived robust response in addition to concerns for co-occurring eating disorder and alcohol use disorder.  - Continue trazodone 50 mg at bedtime for sleep  - Increase risperidone from 2 mg to 3 mg daily further targeting auditory and visual hallucinations in addition to antidepressant augmentation  - EmPATH standing order medications available   - Medication education provided this visit including but not limited to: Rationale for medication, importance of medication adherence, medication interactions, common medication side effects, benefits of medications.  - UDS reviewed pending collection   - Labs  pending collection  - Recommendation to abstain from substance use, consider additional RONNIE supports if patient is willing  -  Anticipate resuming programmatic care once stabilized.  Patient is requesting referral for new psychiatric medication provider  - Problem focused supportive therapy and education provided today related to patient's current and acute stressors, symptoms, and diagnoses.  - Patient would benefit from inpatient stabilization and is awaiting transfer to next available inpatient bed.  Given current suicidal ideation including plan, intent, means, and inability to safety plan, should patient request to leave patient does meet criteria for involuntary hold at time of assessment    --  NICK Bergman CNP   [facility] EMERGENCY DEPT  EmPATH Unit       Maggie Tolentino APRN CNP  05/03/25 2013       Maggie Tolentino APRN CNP  05/03/25 5927

## 2025-05-03 NOTE — ED TRIAGE NOTES
Patient having auditory and visual hallucinations the last few days. Patient unable to get a hold of psychiatrist. Today patient having worsening depression and paranoia. Patient on new meds as well. Patient does endorse SI.

## 2025-05-03 NOTE — ED PROVIDER NOTES
"  Emergency Department Note      History of Present Illness     Chief Complaint   Psychiatric Evaluation      HPI   Ellie Echols is a 49 year old female with a history of depression and hypertension, presenting to the emergency department for a psychiatric evaluation. The patient reports she has been recommended to be seen in the EmPATH unit due to ongoing auditory and visual hallucinations over the past month. The patient reports experiencing auditory hallucinations prior to any visual hallucinations, noting she would hear music or a newscaster. She notes her visual hallucinations include a lumberjack running towards her or visualizing a large bird. She notes that she is always able to figure out that these are hallucinations, and she is able to discern reality from these hallucinations. She denies any prior history of auditory or visual hallucinations. She endorses passive suicidality, and a plan which would include either overdosing on her blood pressure medication or suffocating herself. She denies any recent overdoses, and denies any homicidal ideation. She endorses both depression and anxiety. She denies any recent cough, fevers, shortness of breath, chest pain, vomiting, diarrhea, or headaches.       Independent Historian   None    Review of External Notes   none    Past Medical History     Medical History and Problem List   Asthma   Depression  GERD  Hypertension   Nephrolithiasis  migraine    Medications   Albuterol  Epinephrine  Prozac  Cozaar  Zyrtec  Maxzide-25     Surgical History     Cystourethroscopy  Laminoplasty  Release carpal tunnel  Tubal ligation  Ureteroscopy     Physical Exam     Patient Vitals for the past 24 hrs:   BP Temp Temp src Pulse Resp SpO2 Height Weight   25 1258 124/88 98.5  F (36.9  C) Tympanic 83 19 99 % 1.575 m (5' 2\") 56.7 kg (125 lb)     Physical Exam  Nursing note and vitals reviewed.  Constitutional:  Appears well-developed and well-nourished.   HENT: "   Head:    Atraumatic.   Mouth/Throat:   Oropharynx is clear and moist. No oropharyngeal exudate.   Eyes:    Pupils are equal, round, and reactive to light.   Neck:    Normal range of motion. Neck supple.      No tracheal deviation present. No thyromegaly present.   Cardiovascular:  Normal rate, regular rhythm, no murmur   Pulmonary/Chest: Breath sounds are clear and equal without wheezes or crackles.  Abdominal:   Soft. Bowel sounds are normal. Exhibits no distension and      no mass. There is no tenderness.      There is no rebound and no guarding.   Musculoskeletal:  Exhibits no edema.   Lymphadenopathy:  No cervical adenopathy.   Psychiatric:  Calm and cooperative. Makes good eye contact.  Neurological:   Alert and oriented to person, place, and time.   Skin:    Skin is warm and dry. No rash noted. No pallor.       Diagnostics     Lab Results   Labs Ordered and Resulted from Time of ED Arrival to Time of ED Departure - No data to display    Imaging   No orders to display       EKG   None     Independent Interpretation   None    ED Course      Medications Administered   Medications - No data to display    Procedures   Procedures     Discussion of Management   None    ED Course   ED Course as of 05/03/25 1341   Sat May 03, 2025   1306 I obtained history and examined the patient as noted above.        Additional Documentation  None    Medical Decision Making / Diagnosis     CMS Diagnoses: None    MIPS       None    MDM   Ellie Echols is a 49 year old female who arrives to the emergency department due to mental health concerns for auditory and visual hallucinations as well as suicidal thoughts and anxiety.  I felt the patient was medically cleared so she was sent to [facility] for further mental health evaluation.  She denies any recent overdose or suicide attempt.    Disposition   The patient was transferred to [facility].     Diagnosis     ICD-10-CM    1. Verbalizes suicidal thoughts  R45.851       2. Auditory  hallucination  R44.0       3. Visual hallucinations  R44.1            Discharge Medications   New Prescriptions    No medications on file         Scribe Disclosure:  I, Liborio Jimenez, am serving as a scribe at 1:41 PM on 5/3/2025 to document services personally performed by Jocelyn Knutson MD based on my observations and the provider's statements to me.        Jocelyn Knutson MD  05/03/25 7027

## 2025-05-03 NOTE — ED NOTES
[facility]  ED to [facility] Checklist:      Goal for EMP[facility]: Paranoia management, Medication management, and Hallucinations    Current Behavior: Calm and Cooperative    Safety Concerns: Suicidal, no plan, Auditory Hallucinations, and Visual Hallucinations    Legal Hold Status: Voluntary    Medically Cleared by ED provider: Yes    Patient Therapeutically Searched: Not searched - Currently in triage    Belongings: Remain with patient    Independent Ambulation at Baseline: Yes/No: Yes    Participates in Care/Conversation: Yes/No: Yes    Patient Informed about [facility]: Yes/No: Yes    DEC: Ordered and pending    Patient Ready to be Transferred to [facility]MP[facility]? Yes/No: Yes

## 2025-05-03 NOTE — ED NOTES
IP MH Referral Acuity Rating Score (RARS)    LM complete at referral to IP MH, with DEC; and, daily while awaiting IP MH placement. Call score to PPS.  CRITERIA SCORING   New 73 HH and Involuntary for IP MH (not adolescent) 0/3   Boarding over 24 hours 0/1   Vulnerable adult at least 55+ with multiple co morbidities; or, Patient age 11 or under 0/1   Suicide ideation without relief of precipitating factors 1/1   Current plan for suicide 1/1   Current plan for homicide 0/1   Imminent risk or actual attempt to seriously harm another without relief of factors precipitating the attempt 0/1   Severe dysfunction in daily living (ex: complete neglect for self care, extreme disruption in vegetative function, extreme deterioration in social interactions) 1/1   Recent (last 2 weeks) or current physical aggression in the ED 0/1   Restraints or seclusion episode in ED 0/1   Verbal aggression, agitation, yelling, etc., while in the ED 0/1   Active psychosis with psychomotor agitation or catatonia 0/1   Need for constant or near constant redirection (from leaving, from others, etc).  0/1   Intrusive or disruptive behaviors 1/1   TOTAL 4     ELVA Watkins, Psychotherapist  DEC - Triage & Transition Services  Callback: [phone]

## 2025-05-04 ENCOUNTER — TELEPHONE (OUTPATIENT)
Dept: BEHAVIORAL HEALTH | Facility: CLINIC | Age: 50
End: 2025-05-04
Payer: COMMERCIAL

## 2025-05-04 PROCEDURE — G0378 HOSPITAL OBSERVATION PER HR: HCPCS

## 2025-05-04 NOTE — PROGRESS NOTES
Patient agrees to discharge plan. Discharge instructions reviewed with patient including follow-up care plan. Medications: reviewed. Reviewed safety plan and outpatient resources. Denies SI and HI. All belongings that were brought into the hospital have been returned to patient. Escorted off the unit at 1252am accompanied by [facility] staff. Discharged to [facility] via ambulance.

## 2025-05-17 ENCOUNTER — HEALTH MAINTENANCE LETTER (OUTPATIENT)
Age: 50
End: 2025-05-17

## 2025-05-24 ENCOUNTER — TRANSFERRED RECORDS (OUTPATIENT)
Dept: MULTI SPECIALTY CLINIC | Facility: CLINIC | Age: 50
End: 2025-05-24
Payer: COMMERCIAL

## 2025-05-24 LAB
CREATININE (EXTERNAL): 0.6 MG/DL (ref 0.7–1.4)
GLUCOSE (EXTERNAL): 83 MG/DL (ref 60–99)
POTASSIUM (EXTERNAL): 3.8 MMOL/L (ref 3.5–5.1)

## 2025-05-27 RX ORDER — PRAZOSIN HYDROCHLORIDE 1 MG/1
1 CAPSULE ORAL AT BEDTIME
COMMUNITY

## 2025-05-27 RX ORDER — OLANZAPINE 10 MG/1
10 TABLET, FILM COATED ORAL 3 TIMES DAILY
COMMUNITY

## 2025-05-28 ENCOUNTER — ANESTHESIA EVENT (OUTPATIENT)
Dept: SURGERY | Facility: HOSPITAL | Age: 50
End: 2025-05-28
Payer: COMMERCIAL

## 2025-05-28 ENCOUNTER — ANESTHESIA (OUTPATIENT)
Dept: SURGERY | Facility: HOSPITAL | Age: 50
End: 2025-05-28
Payer: COMMERCIAL

## 2025-05-28 ENCOUNTER — HOSPITAL ENCOUNTER (OUTPATIENT)
Facility: HOSPITAL | Age: 50
Discharge: HOME | End: 2025-05-28
Attending: OBSTETRICS & GYNECOLOGY | Admitting: OBSTETRICS & GYNECOLOGY
Payer: COMMERCIAL

## 2025-05-28 VITALS
BODY MASS INDEX: 23.98 KG/M2 | OXYGEN SATURATION: 95 % | RESPIRATION RATE: 16 BRPM | WEIGHT: 130.3 LBS | DIASTOLIC BLOOD PRESSURE: 89 MMHG | SYSTOLIC BLOOD PRESSURE: 129 MMHG | HEIGHT: 62 IN | TEMPERATURE: 98.5 F | HEART RATE: 75 BPM

## 2025-05-28 DIAGNOSIS — Z90.710 H/O: HYSTERECTOMY: Primary | ICD-10-CM

## 2025-05-28 LAB
ABO + RH BLD: NORMAL
BLD GP AB SCN SERPL QL: NEGATIVE
HCG UR QL: NEGATIVE
HOLD SPECIMEN: NORMAL
SPECIMEN EXP DATE BLD: NORMAL

## 2025-05-28 PROCEDURE — 258N000003 HC RX IP 258 OP 636: Performed by: ANESTHESIOLOGY

## 2025-05-28 PROCEDURE — 710N000009 HC RECOVERY PHASE 1, LEVEL 1, PER MIN: Performed by: OBSTETRICS & GYNECOLOGY

## 2025-05-28 PROCEDURE — 370N000017 HC ANESTHESIA TECHNICAL FEE, PER MIN: Performed by: OBSTETRICS & GYNECOLOGY

## 2025-05-28 PROCEDURE — 360N000080 HC SURGERY LEVEL 7, PER MIN: Performed by: OBSTETRICS & GYNECOLOGY

## 2025-05-28 PROCEDURE — 250N000009 HC RX 250: Performed by: ANESTHESIOLOGY

## 2025-05-28 PROCEDURE — 250N000013 HC RX MED GY IP 250 OP 250 PS 637: Performed by: OBSTETRICS & GYNECOLOGY

## 2025-05-28 PROCEDURE — 81025 URINE PREGNANCY TEST: CPT | Performed by: NURSE PRACTITIONER

## 2025-05-28 PROCEDURE — 88302 TISSUE EXAM BY PATHOLOGIST: CPT | Mod: TC | Performed by: OBSTETRICS & GYNECOLOGY

## 2025-05-28 PROCEDURE — 88307 TISSUE EXAM BY PATHOLOGIST: CPT | Mod: 26 | Performed by: PATHOLOGY

## 2025-05-28 PROCEDURE — 86901 BLOOD TYPING SEROLOGIC RH(D): CPT | Performed by: NURSE PRACTITIONER

## 2025-05-28 PROCEDURE — C1765 ADHESION BARRIER: HCPCS | Performed by: OBSTETRICS & GYNECOLOGY

## 2025-05-28 PROCEDURE — 36415 COLL VENOUS BLD VENIPUNCTURE: CPT | Performed by: NURSE PRACTITIONER

## 2025-05-28 PROCEDURE — 250N000013 HC RX MED GY IP 250 OP 250 PS 637: Performed by: NURSE PRACTITIONER

## 2025-05-28 PROCEDURE — S2900 ROBOTIC SURGICAL SYSTEM: HCPCS | Performed by: SURGERY

## 2025-05-28 PROCEDURE — 710N000012 HC RECOVERY PHASE 2, PER MINUTE: Performed by: OBSTETRICS & GYNECOLOGY

## 2025-05-28 PROCEDURE — 258N000003 HC RX IP 258 OP 636: Performed by: OBSTETRICS & GYNECOLOGY

## 2025-05-28 PROCEDURE — 999N000141 HC STATISTIC PRE-PROCEDURE NURSING ASSESSMENT: Performed by: OBSTETRICS & GYNECOLOGY

## 2025-05-28 PROCEDURE — 250N000011 HC RX IP 250 OP 636: Performed by: ANESTHESIOLOGY

## 2025-05-28 PROCEDURE — 44180 LAP ENTEROLYSIS: CPT | Mod: 52 | Performed by: SURGERY

## 2025-05-28 PROCEDURE — 250N000009 HC RX 250: Performed by: NURSE ANESTHETIST, CERTIFIED REGISTERED

## 2025-05-28 PROCEDURE — 250N000025 HC SEVOFLURANE, PER MIN: Performed by: OBSTETRICS & GYNECOLOGY

## 2025-05-28 PROCEDURE — 250N000011 HC RX IP 250 OP 636: Performed by: OBSTETRICS & GYNECOLOGY

## 2025-05-28 PROCEDURE — 272N000001 HC OR GENERAL SUPPLY STERILE: Performed by: OBSTETRICS & GYNECOLOGY

## 2025-05-28 PROCEDURE — 250N000011 HC RX IP 250 OP 636: Performed by: NURSE PRACTITIONER

## 2025-05-28 PROCEDURE — 250N000009 HC RX 250: Performed by: OBSTETRICS & GYNECOLOGY

## 2025-05-28 PROCEDURE — 250N000011 HC RX IP 250 OP 636: Performed by: NURSE ANESTHETIST, CERTIFIED REGISTERED

## 2025-05-28 RX ORDER — SODIUM CHLORIDE, SODIUM LACTATE, POTASSIUM CHLORIDE, AND CALCIUM CHLORIDE .6; .31; .03; .02 G/100ML; G/100ML; G/100ML; G/100ML
IRRIGANT IRRIGATION PRN
Status: DISCONTINUED | OUTPATIENT
Start: 2025-05-28 | End: 2025-05-28 | Stop reason: HOSPADM

## 2025-05-28 RX ORDER — ONDANSETRON 2 MG/ML
4 INJECTION INTRAMUSCULAR; INTRAVENOUS EVERY 30 MIN PRN
Status: DISCONTINUED | OUTPATIENT
Start: 2025-05-28 | End: 2025-05-28 | Stop reason: HOSPADM

## 2025-05-28 RX ORDER — NALOXONE HYDROCHLORIDE 1 MG/ML
0.4 INJECTION INTRAMUSCULAR; INTRAVENOUS; SUBCUTANEOUS
Status: DISCONTINUED | OUTPATIENT
Start: 2025-05-28 | End: 2025-05-28 | Stop reason: HOSPADM

## 2025-05-28 RX ORDER — PROPOFOL 10 MG/ML
INJECTION, EMULSION INTRAVENOUS PRN
Status: DISCONTINUED | OUTPATIENT
Start: 2025-05-28 | End: 2025-05-28

## 2025-05-28 RX ORDER — ONDANSETRON 4 MG/1
4 TABLET, ORALLY DISINTEGRATING ORAL EVERY 30 MIN PRN
Status: DISCONTINUED | OUTPATIENT
Start: 2025-05-28 | End: 2025-05-28 | Stop reason: HOSPADM

## 2025-05-28 RX ORDER — GABAPENTIN 100 MG/1
100 CAPSULE ORAL 3 TIMES DAILY PRN
COMMUNITY

## 2025-05-28 RX ORDER — METOPROLOL TARTRATE 1 MG/ML
INJECTION, SOLUTION INTRAVENOUS PRN
Status: DISCONTINUED | OUTPATIENT
Start: 2025-05-28 | End: 2025-05-28

## 2025-05-28 RX ORDER — NALOXONE HYDROCHLORIDE 1 MG/ML
0.2 INJECTION INTRAMUSCULAR; INTRAVENOUS; SUBCUTANEOUS
Status: DISCONTINUED | OUTPATIENT
Start: 2025-05-28 | End: 2025-05-28 | Stop reason: HOSPADM

## 2025-05-28 RX ORDER — BUPIVACAINE HYDROCHLORIDE 2.5 MG/ML
INJECTION, SOLUTION INFILTRATION; PERINEURAL PRN
Status: DISCONTINUED | OUTPATIENT
Start: 2025-05-28 | End: 2025-05-28 | Stop reason: HOSPADM

## 2025-05-28 RX ORDER — ACETAMINOPHEN 325 MG/1
975 TABLET ORAL ONCE
Status: DISCONTINUED | OUTPATIENT
Start: 2025-05-28 | End: 2025-05-28

## 2025-05-28 RX ORDER — FENTANYL CITRATE 50 UG/ML
INJECTION, SOLUTION INTRAMUSCULAR; INTRAVENOUS PRN
Status: DISCONTINUED | OUTPATIENT
Start: 2025-05-28 | End: 2025-05-28

## 2025-05-28 RX ORDER — OXYCODONE HYDROCHLORIDE 5 MG/1
5 TABLET ORAL EVERY 6 HOURS PRN
Qty: 10 TABLET | Refills: 0 | Status: SHIPPED | OUTPATIENT
Start: 2025-05-28 | End: 2025-05-31

## 2025-05-28 RX ORDER — OXYCODONE HYDROCHLORIDE 5 MG/1
10 TABLET ORAL
Status: DISCONTINUED | OUTPATIENT
Start: 2025-05-28 | End: 2025-05-28 | Stop reason: HOSPADM

## 2025-05-28 RX ORDER — CEFAZOLIN SODIUM/WATER 2 G/20 ML
2 SYRINGE (ML) INTRAVENOUS
Status: COMPLETED | OUTPATIENT
Start: 2025-05-28 | End: 2025-05-28

## 2025-05-28 RX ORDER — SODIUM CHLORIDE, SODIUM LACTATE, POTASSIUM CHLORIDE, CALCIUM CHLORIDE 600; 310; 30; 20 MG/100ML; MG/100ML; MG/100ML; MG/100ML
INJECTION, SOLUTION INTRAVENOUS CONTINUOUS
Status: DISCONTINUED | OUTPATIENT
Start: 2025-05-28 | End: 2025-05-28 | Stop reason: HOSPADM

## 2025-05-28 RX ORDER — ONDANSETRON 2 MG/ML
INJECTION INTRAMUSCULAR; INTRAVENOUS PRN
Status: DISCONTINUED | OUTPATIENT
Start: 2025-05-28 | End: 2025-05-28

## 2025-05-28 RX ORDER — FENTANYL CITRATE 50 UG/ML
50 INJECTION, SOLUTION INTRAMUSCULAR; INTRAVENOUS EVERY 5 MIN PRN
Status: DISCONTINUED | OUTPATIENT
Start: 2025-05-28 | End: 2025-05-28 | Stop reason: HOSPADM

## 2025-05-28 RX ORDER — OXYCODONE HYDROCHLORIDE 5 MG/1
5 TABLET ORAL
Status: DISCONTINUED | OUTPATIENT
Start: 2025-05-28 | End: 2025-05-28 | Stop reason: HOSPADM

## 2025-05-28 RX ORDER — ACETAMINOPHEN 325 MG/1
975 TABLET ORAL ONCE
Status: COMPLETED | OUTPATIENT
Start: 2025-05-28 | End: 2025-05-28

## 2025-05-28 RX ORDER — ONDANSETRON 4 MG/1
4 TABLET, ORALLY DISINTEGRATING ORAL EVERY 8 HOURS PRN
Qty: 15 TABLET | Refills: 1 | Status: SHIPPED | OUTPATIENT
Start: 2025-05-28

## 2025-05-28 RX ORDER — LIDOCAINE HYDROCHLORIDE 10 MG/ML
INJECTION, SOLUTION INFILTRATION; PERINEURAL PRN
Status: DISCONTINUED | OUTPATIENT
Start: 2025-05-28 | End: 2025-05-28

## 2025-05-28 RX ORDER — NALOXONE HYDROCHLORIDE 0.4 MG/ML
0.1 INJECTION, SOLUTION INTRAMUSCULAR; INTRAVENOUS; SUBCUTANEOUS
Status: DISCONTINUED | OUTPATIENT
Start: 2025-05-28 | End: 2025-05-28 | Stop reason: HOSPADM

## 2025-05-28 RX ORDER — HYDROMORPHONE HCL IN WATER/PF 6 MG/30 ML
0.4 PATIENT CONTROLLED ANALGESIA SYRINGE INTRAVENOUS EVERY 5 MIN PRN
Status: DISCONTINUED | OUTPATIENT
Start: 2025-05-28 | End: 2025-05-28 | Stop reason: HOSPADM

## 2025-05-28 RX ORDER — IBUPROFEN 200 MG
800 TABLET ORAL ONCE
Status: DISCONTINUED | OUTPATIENT
Start: 2025-05-28 | End: 2025-05-28 | Stop reason: HOSPADM

## 2025-05-28 RX ORDER — DEXAMETHASONE SODIUM PHOSPHATE 4 MG/ML
4 INJECTION, SOLUTION INTRA-ARTICULAR; INTRALESIONAL; INTRAMUSCULAR; INTRAVENOUS; SOFT TISSUE
Status: DISCONTINUED | OUTPATIENT
Start: 2025-05-28 | End: 2025-05-28 | Stop reason: HOSPADM

## 2025-05-28 RX ORDER — PROPOFOL 10 MG/ML
INJECTION, EMULSION INTRAVENOUS CONTINUOUS PRN
Status: DISCONTINUED | OUTPATIENT
Start: 2025-05-28 | End: 2025-05-28

## 2025-05-28 RX ORDER — KETOROLAC TROMETHAMINE 30 MG/ML
15 INJECTION, SOLUTION INTRAMUSCULAR; INTRAVENOUS
Status: DISCONTINUED | OUTPATIENT
Start: 2025-05-28 | End: 2025-05-28 | Stop reason: HOSPADM

## 2025-05-28 RX ORDER — SCOPOLAMINE 1 MG/3D
1 PATCH, EXTENDED RELEASE TRANSDERMAL ONCE
Status: DISCONTINUED | OUTPATIENT
Start: 2025-05-28 | End: 2025-05-28 | Stop reason: HOSPADM

## 2025-05-28 RX ORDER — CEFAZOLIN SODIUM/WATER 2 G/20 ML
2 SYRINGE (ML) INTRAVENOUS SEE ADMIN INSTRUCTIONS
Status: DISCONTINUED | OUTPATIENT
Start: 2025-05-28 | End: 2025-05-28 | Stop reason: HOSPADM

## 2025-05-28 RX ORDER — FENTANYL CITRATE 50 UG/ML
25 INJECTION, SOLUTION INTRAMUSCULAR; INTRAVENOUS EVERY 5 MIN PRN
Status: DISCONTINUED | OUTPATIENT
Start: 2025-05-28 | End: 2025-05-28 | Stop reason: HOSPADM

## 2025-05-28 RX ORDER — DEXAMETHASONE SODIUM PHOSPHATE 10 MG/ML
INJECTION, SOLUTION INTRAMUSCULAR; INTRAVENOUS PRN
Status: DISCONTINUED | OUTPATIENT
Start: 2025-05-28 | End: 2025-05-28

## 2025-05-28 RX ORDER — NALOXONE HYDROCHLORIDE 1 MG/ML
0.1 INJECTION INTRAMUSCULAR; INTRAVENOUS; SUBCUTANEOUS
Status: DISCONTINUED | OUTPATIENT
Start: 2025-05-28 | End: 2025-05-28 | Stop reason: HOSPADM

## 2025-05-28 RX ORDER — AMOXICILLIN AND CLAVULANATE POTASSIUM 500; 125 MG/1; MG/1
1 TABLET, FILM COATED ORAL 2 TIMES DAILY
Qty: 14 TABLET | Refills: 0 | Status: SHIPPED | OUTPATIENT
Start: 2025-05-28 | End: 2025-06-04

## 2025-05-28 RX ORDER — LIDOCAINE 40 MG/G
CREAM TOPICAL
Status: DISCONTINUED | OUTPATIENT
Start: 2025-05-28 | End: 2025-05-28 | Stop reason: HOSPADM

## 2025-05-28 RX ORDER — HYDROMORPHONE HCL IN WATER/PF 6 MG/30 ML
0.2 PATIENT CONTROLLED ANALGESIA SYRINGE INTRAVENOUS EVERY 5 MIN PRN
Status: DISCONTINUED | OUTPATIENT
Start: 2025-05-28 | End: 2025-05-28 | Stop reason: HOSPADM

## 2025-05-28 RX ORDER — METRONIDAZOLE 500 MG/100ML
500 INJECTION, SOLUTION INTRAVENOUS ONCE
Status: COMPLETED | OUTPATIENT
Start: 2025-05-28 | End: 2025-05-28

## 2025-05-28 RX ORDER — KETOROLAC TROMETHAMINE 30 MG/ML
INJECTION, SOLUTION INTRAMUSCULAR; INTRAVENOUS PRN
Status: DISCONTINUED | OUTPATIENT
Start: 2025-05-28 | End: 2025-05-28

## 2025-05-28 RX ORDER — DEXAMETHASONE SODIUM PHOSPHATE 10 MG/ML
4 INJECTION, SOLUTION INTRAMUSCULAR; INTRAVENOUS
Status: DISCONTINUED | OUTPATIENT
Start: 2025-05-28 | End: 2025-05-28 | Stop reason: HOSPADM

## 2025-05-28 RX ADMIN — ROCURONIUM 20 MG: 50 INJECTION, SOLUTION INTRAVENOUS at 14:01

## 2025-05-28 RX ADMIN — LIDOCAINE HYDROCHLORIDE 30 MG: 10 INJECTION, SOLUTION INFILTRATION; PERINEURAL at 10:24

## 2025-05-28 RX ADMIN — SODIUM CHLORIDE 8 MCG: 9 INJECTION, SOLUTION INTRAVENOUS at 11:12

## 2025-05-28 RX ADMIN — KETOROLAC TROMETHAMINE 15 MG: 30 INJECTION, SOLUTION INTRAMUSCULAR at 14:45

## 2025-05-28 RX ADMIN — HYDROMORPHONE HYDROCHLORIDE 0.5 MG: 1 INJECTION, SOLUTION INTRAMUSCULAR; INTRAVENOUS; SUBCUTANEOUS at 11:07

## 2025-05-28 RX ADMIN — SODIUM CHLORIDE 8 MCG: 9 INJECTION, SOLUTION INTRAVENOUS at 11:01

## 2025-05-28 RX ADMIN — SODIUM CHLORIDE, SODIUM LACTATE, POTASSIUM CHLORIDE, AND CALCIUM CHLORIDE: .6; .31; .03; .02 INJECTION, SOLUTION INTRAVENOUS at 09:45

## 2025-05-28 RX ADMIN — ONDANSETRON 4 MG: 2 INJECTION INTRAMUSCULAR; INTRAVENOUS at 14:41

## 2025-05-28 RX ADMIN — ACETAMINOPHEN 975 MG: 325 TABLET ORAL at 09:48

## 2025-05-28 RX ADMIN — METRONIDAZOLE 500 MG: 500 INJECTION, SOLUTION INTRAVENOUS at 13:59

## 2025-05-28 RX ADMIN — HYDROMORPHONE HYDROCHLORIDE 0.5 MG: 1 INJECTION, SOLUTION INTRAMUSCULAR; INTRAVENOUS; SUBCUTANEOUS at 11:16

## 2025-05-28 RX ADMIN — SUGAMMADEX 120 MG: 100 INJECTION, SOLUTION INTRAVENOUS at 14:49

## 2025-05-28 RX ADMIN — ROCURONIUM 60 MG: 50 INJECTION, SOLUTION INTRAVENOUS at 10:25

## 2025-05-28 RX ADMIN — DEXAMETHASONE SODIUM PHOSPHATE 10 MG: 10 INJECTION, SOLUTION INTRAMUSCULAR; INTRAVENOUS at 10:24

## 2025-05-28 RX ADMIN — PROPOFOL 30 MG: 10 INJECTION, EMULSION INTRAVENOUS at 13:15

## 2025-05-28 RX ADMIN — Medication 2 G: at 10:27

## 2025-05-28 RX ADMIN — ROCURONIUM 20 MG: 50 INJECTION, SOLUTION INTRAVENOUS at 11:21

## 2025-05-28 RX ADMIN — SODIUM CHLORIDE 8 MCG: 9 INJECTION, SOLUTION INTRAVENOUS at 11:29

## 2025-05-28 RX ADMIN — PROPOFOL 200 MG: 10 INJECTION, EMULSION INTRAVENOUS at 10:24

## 2025-05-28 RX ADMIN — FENTANYL CITRATE 50 MCG: 50 INJECTION, SOLUTION INTRAMUSCULAR; INTRAVENOUS at 10:21

## 2025-05-28 RX ADMIN — SCOPOLAMINE 1 PATCH: 1.5 PATCH, EXTENDED RELEASE TRANSDERMAL at 10:13

## 2025-05-28 RX ADMIN — MIDAZOLAM HYDROCHLORIDE 2 MG: 1 INJECTION, SOLUTION INTRAMUSCULAR; INTRAVENOUS at 10:17

## 2025-05-28 RX ADMIN — ROCURONIUM 20 MG: 50 INJECTION, SOLUTION INTRAVENOUS at 13:15

## 2025-05-28 RX ADMIN — ROCURONIUM 20 MG: 50 INJECTION, SOLUTION INTRAVENOUS at 12:01

## 2025-05-28 RX ADMIN — SODIUM CHLORIDE 8 MCG: 9 INJECTION, SOLUTION INTRAVENOUS at 11:37

## 2025-05-28 RX ADMIN — PROPOFOL 20 MG: 10 INJECTION, EMULSION INTRAVENOUS at 13:42

## 2025-05-28 RX ADMIN — SODIUM CHLORIDE, SODIUM LACTATE, POTASSIUM CHLORIDE, AND CALCIUM CHLORIDE: .6; .31; .03; .02 INJECTION, SOLUTION INTRAVENOUS at 13:00

## 2025-05-28 RX ADMIN — ROCURONIUM 20 MG: 50 INJECTION, SOLUTION INTRAVENOUS at 12:45

## 2025-05-28 RX ADMIN — METOPROLOL TARTRATE 2 MG: 5 INJECTION INTRAVENOUS at 13:49

## 2025-05-28 RX ADMIN — SODIUM CHLORIDE 4 MCG: 9 INJECTION, SOLUTION INTRAVENOUS at 13:00

## 2025-05-28 RX ADMIN — FENTANYL CITRATE 50 MCG: 50 INJECTION, SOLUTION INTRAMUSCULAR; INTRAVENOUS at 10:17

## 2025-05-28 RX ADMIN — PROPOFOL 200 MCG/KG/MIN: 10 INJECTION, EMULSION INTRAVENOUS at 10:25

## 2025-05-28 RX ADMIN — SODIUM CHLORIDE 4 MCG: 9 INJECTION, SOLUTION INTRAVENOUS at 11:07

## 2025-05-28 RX ADMIN — ACETAMINOPHEN 975 MG: 325 TABLET ORAL at 16:39

## 2025-05-28 ASSESSMENT — ACTIVITIES OF DAILY LIVING (ADL)
ADLS_ACUITY_SCORE: 18
ADLS_ACUITY_SCORE: 41
ADLS_ACUITY_SCORE: 18
ADLS_ACUITY_SCORE: 19
ADLS_ACUITY_SCORE: 18

## 2025-05-28 NOTE — ANESTHESIA PREPROCEDURE EVALUATION
Anesthesia Pre-Procedure Evaluation    Patient: Ellie Echols   MRN: [id number] : 1975          Procedure : Procedure(s):  ROBOTIC ASSISTED LAPAROSCOPIC HYSTERECTOMY, BILATERAL SALPINGECTOMY, CYSTOSCOPY         Past Medical History:   Diagnosis Date    Anorexia     Asthma     Bladder infection     Bulimia nervosa (H)     Cervical cord compression with myelopathy (H)     Chronic back pain     Depression     Foraminal stenosis of cervical region     GABRIELLE (generalized anxiety disorder)     GERD (gastroesophageal reflux disease)     History of alcoholism (H)     Hypertension     Internal hemorrhoids     Kidney stone     Migraine     Mood disorder     Nephrolithiasis     Painful swelling of joint     Recurrent major depressive episodes       Past Surgical History:   Procedure Laterality Date     SECTION      five    CYSTOURETHROSCOPY      Description: Cystoscopy (For Therapy);  Proc Date: 06/15/2012;  Comments: Performed at [facility]    LAMINOPLASTY Left 2018    Procedure: CERVICAL 4 - CERVICAL 7 OPEN LEFT LAMINOPLASTY ;  Surgeon: Yu Schofield MD;  Location: [facility];  Service:     RELEASE CARPAL TUNNEL Right     TUBAL LIGATION      URETEROSCOPY        Allergies   Allergen Reactions    Minocycline Hives    Seasonal Allergies     Prochlorperazine Anxiety      Social History     Tobacco Use    Smoking status: Former     Current packs/day: 0.00     Average packs/day: 0.3 packs/day for 1 year (0.3 ttl pk-yrs)     Types: Cigarettes     Start date: 2012     Quit date: 2013     Years since quittin.4    Smokeless tobacco: Never   Substance Use Topics    Alcohol use: Yes     Comment: Alcoholic Drinks/day: rare      Wt Readings from Last 1 Encounters:   25 59.3 kg (130 lb 12.8 oz)        Anesthesia Evaluation   Pt has had prior anesthetic. Type: General.    History of anesthetic complications       ROS/MED HX  ENT/Pulmonary:     (+)            vocal cord abnormalities -   "Hoarseness,        Intermittent, asthma  Treatment: Inhaler prn,                 Neurologic:       Cardiovascular:     (+)  hypertension- -   -  - -                                      METS/Exercise Tolerance:     Hematologic: Comments: Hgb 10.8    (+)      anemia,          Musculoskeletal:       GI/Hepatic:     (+) GERD, Asymptomatic on medication,                  Renal/Genitourinary:     (+)       Nephrolithiasis ,       Endo:       Psychiatric/Substance Use: Comment: Anorexia  Bulimia nervosa    (+) psychiatric history anxiety and depression alcohol abuse      Infectious Disease:       Malignancy:       Other:     (-) Any chance pregnant         Physical Exam  Airway  Mallampati: I  TM distance: >3 FB  Neck ROM: limited    Cardiovascular - normal exam  Rhythm: regular  Rate: normal rate     Dental     Pulmonary - normal examBreath sounds clear to auscultation        Neurological - normal exam  She appears awake, alert and oriented x3.    Other Findings       OUTSIDE LABS:  CBC:   Lab Results   Component Value Date    WBC 3.9 (L) 05/03/2025    WBC 4.5 07/03/2020    HGB 11.5 (L) 05/03/2025    HGB 12.5 07/03/2020    HCT 36.0 05/03/2025    HCT 38.0 07/03/2020     05/03/2025     07/03/2020     BMP:   Lab Results   Component Value Date     05/03/2025     07/03/2020    POTASSIUM 3.3 (L) 05/03/2025    POTASSIUM 3.8 07/03/2020    CHLORIDE 100 05/03/2025    CHLORIDE 99 07/03/2020    CO2 28 05/03/2025    CO2 27 07/03/2020    BUN 6.6 05/03/2025    BUN 9 07/03/2020    CR 0.76 05/03/2025    CR 0.80 07/03/2020    GLC 82 05/03/2025    GLC 82 07/03/2020     COAGS: No results found for: \"PTT\", \"INR\", \"FIBR\"  POC:   Lab Results   Component Value Date    HCG Negative 05/03/2025     HEPATIC:   Lab Results   Component Value Date    ALBUMIN 4.3 05/03/2025    PROTTOTAL 6.9 05/03/2025    ALT 12 05/03/2025    AST 18 05/03/2025    ALKPHOS 34 (L) 05/03/2025    BILITOTAL 0.3 05/03/2025     OTHER:   Lab Results "   Component Value Date    ALLISON 9.6 05/03/2025    PHOS 3.0 05/08/2018    MAG 2.0 07/03/2020    TSH 1.28 07/03/2020    CRP <0.1 04/05/2019       Anesthesia Plan    ASA Status:  2      NPO Status: NPO Appropriate   Anesthesia Type: General.  Airway: oral.  Induction: intravenous.  Maintenance: Balanced, TIVA.   Techniques and Equipment:     - Airway:  Planned airway equipment includes video laryngoscope.     - Monitoring Plan: standard ASA monitoring     Consents    Anesthesia Plan(s) and associated risks, benefits, and realistic alternatives discussed. Questions answered and patient/representative(s) expressed understanding.     - Discussed: CRNA     - Discussed with:  Patient          Blood Consent:      - Discussed with: patient.     - Consented: consented to blood products     Postoperative Care    Pain management: multimodal analgesia.     Comments:                   Castro Hercules MD    I have reviewed the pertinent notes and labs in the chart from the past 30 days and (re)examined the patient.  Any updates or changes from those notes are reflected in this note.    Clinically Significant Risk Factors Present on Admission                   # Hypertension: Noted on problem list               # Financial/Environmental Concerns:

## 2025-05-28 NOTE — H&P
H&P reviewed and no changes identified.  Reviewed risks of procedure in detail. Will proceed.  Reviewed specifically wrist due to her history of 5  sections.  Discussed risks of injury to the bladder or bowel ureter, infection, bleeding, reoperation.  All questions answered and addressed  Domi Zheng MD

## 2025-05-28 NOTE — ANESTHESIA POSTPROCEDURE EVALUATION
Patient: Ellie Echols    Procedure: Procedure(s):  ROBOTIC ASSISTED LAPAROSCOPIC EXTENSIVE ADHESIOLYSIS OF, HYSTERECTOMY, BILATERAL SALPINGECTOMY, CYSTOSCOPY  LYSIS, ADHESIONS FROM VENTRAL HERNIA  ROBOT-ASSISTED, LAPAROSCOPIC, USING DA PAUL XI       Anesthesia Type:  General    Note:  Disposition: Outpatient   Postop Pain Control: Uneventful            Sign Out: Well controlled pain   PONV: No   Neuro/Psych: Uneventful            Sign Out: Acceptable/Baseline neuro status   Airway/Respiratory: Uneventful            Sign Out: Acceptable/Baseline resp. status   CV/Hemodynamics: Uneventful            Sign Out: Acceptable CV status; No obvious hypovolemia; No obvious fluid overload   Other NRE: NONE   DID A NON-ROUTINE EVENT OCCUR? No         Last vitals:  Vitals Value Taken Time   /109 05/28/25 16:15   Temp 36.8  C (98.24  F) 05/28/25 16:15   Pulse 79 05/28/25 16:15   Resp 25 05/28/25 16:11   SpO2 94 % 05/28/25 16:15   Vitals shown include unfiled device data.    Electronically Signed By: Cherelle العراقي MD  May 28, 2025  4:17 PM   Please see Dietitian Initial Assessment for complete recommendations.  Jaimie Handley MS, RD, CDN, CNSC (pg #[phone])

## 2025-05-28 NOTE — BRIEF OP NOTE
[facility]    Brief Operative Note    Pre-operative diagnosis: Abnormal uterine bleeding [N93.9]  Pelvic adhesive disease [N73.6]  Post-operative diagnosis Same as pre-operative diagnosis   And adhesive disease, ventral hernia.  Procedure: ROBOTIC ASSISTED LAPAROSCOPIC EXTENSIVE ADHESIOLYSIS OF, HYSTERECTOMY, BILATERAL SALPINGECTOMY, CYSTOSCOPY, Bilateral - Abdomen  LYSIS, ADHESIONS FROM VENTRAL HERNIA  ROBOT-ASSISTED, LAPAROSCOPIC, USING DA PAUL XI, N/A - Abdomen    Surgeon: Surgeons and Role:  Panel 1:     * Domi Zheng MD - Primary     * Jameson Story MD  Panel 2:     * Jameson Story MD - Primary  Anesthesia: General   Estimated Blood Loss: 150    Drains: None  Specimens:   ID Type Source Tests Collected by Time Destination   1 : LEFT FALLOPIAN TUBE Tissue Fallopian Tube, Left SURGICAL PATHOLOGY EXAM Domi Zheng MD 5/28/2025 11:55 AM    2 : RIGHT FALLOPIAN TUBE Tissue Fallopian Tube, Right SURGICAL PATHOLOGY EXAM Domi Zheng MD 5/28/2025 12:16 PM    3 : UTERUS CERVIX Tissue Uterus, Cervix SURGICAL PATHOLOGY EXAM Domi Zheng MD 5/28/2025  2:15 PM      Findings:  Extensive adhesions, large ventral hernia.  Bladder contiguous with the lower aspect of the uterus.  Uterus adhered to the anterior abdominal wall.  Bowel adhesions to the anterior abdominal wall.  Normal ovaries bilateral     .  Complications: None noted.  Implants: * No implants in log *      Domi Zheng MD

## 2025-05-28 NOTE — ANESTHESIA CARE TRANSFER NOTE
Patient: Ellie Echols    Procedure: Procedure(s):  ROBOTIC ASSISTED LAPAROSCOPIC EXTENSIVE ADHESIOLYSIS OF, HYSTERECTOMY, BILATERAL SALPINGECTOMY, CYSTOSCOPY  LYSIS, ADHESIONS FROM VENTRAL HERNIA  ROBOT-ASSISTED, LAPAROSCOPIC, USING DA PAUL XI       Diagnosis: Abnormal uterine bleeding [N93.9]  Pelvic adhesive disease [N73.6]  Diagnosis Additional Information: No value filed.    Anesthesia Type:   General     Note:    Oropharynx: oropharynx clear of all foreign objects and spontaneously breathing  Level of Consciousness: drowsy  Oxygen Supplementation: face mask  Level of Supplemental Oxygen (L/min / FiO2): 8  Independent Airway: airway patency satisfactory and stable  Dentition: dentition unchanged  Vital Signs Stable: post-procedure vital signs reviewed and stable  Report to RN Given: handoff report given  Patient transferred to: PACU    Handoff Report: Identifed the Patient, Identified the Reponsible Provider, Reviewed the pertinent medical history, Discussed the surgical course, Reviewed Intra-OP anesthesia mangement and issues during anesthesia, Set expectations for post-procedure period and Allowed opportunity for questions and acknowledgement of understanding      Vitals:  Vitals Value Taken Time   /94 05/28/25 15:19   Temp 2.7  C (36.8  F) 05/28/25 15:19   Pulse 78 05/28/25 15:19   Resp 14 05/28/25 15:19   SpO2 100 % 05/28/25 15:19       Electronically Signed By: NICK Quarles CRNA  May 28, 2025  3:20 PM

## 2025-05-28 NOTE — OP NOTE
Operative Note    Name:  Ellie Echols  Location: [facility] OR  Procedure Date:  5/28/2025  PCP:  Maru Starks    Surgery Performed:  Procedure/Surgery Information   Procedure: Robotic lysis of adhesions reducing the bowel from a ventral hernia defect   Surgeon(s): Jameson Story MD   Specimens: ID Type Source Tests Collected by Time Destination   1 : LEFT FALLOPIAN TUBE Tissue Fallopian Tube, Left SURGICAL PATHOLOGY EXAM Domi Zheng MD 5/28/2025 11:55 AM    2 : RIGHT FALLOPIAN TUBE Tissue Fallopian Tube, Right SURGICAL PATHOLOGY EXAM Domi Zheng MD 5/28/2025 12:16 PM                Pre-Procedure Diagnosis:  Abnormal uterine bleeding [N93.9]  Pelvic adhesive disease [N73.6]    Post-Procedure Diagnosis:    Abnormal uterine bleeding [N93.9]  Pelvic adhesive disease [N73.6]    Anesthesia:  General Endotracheal anesthesia        Findings:  The patient was found to have a ventral hernia with small bowel incarcerated up inside of the hernia defect.    Operative Report:    The patient is being treated with a robotic hysterectomy and bilateral salpingectomy with Dr. Zheng.  The patient was found to have a ventral hernia with bowel adhesed up inside of the hernia defect.  I was requested from the general surgery program to come help deal with these of bowel adhesions.  Using a [place] grasper and monopolar scissors I sharply dissected and applied some cautery where appropriate to dissected the small bowel free from the hernia defect.  Once the loops of small intestine were freed from the ventral hernia I turned this surgery back over to Dr. Zheng.    Estimated Blood Loss:   5 cc       Drains:   GI Enteral Access Device Mouth, center Gastric 18 fr (Active)       Urinary Drain 05/28/25 Urethral Catheter Latex;Double-lumen;Straight-tip 16 fr (Active)       Implants:  * No implants in log *    Complications:    None    Jameson Story MD     Date: 5/28/2025  Time: 1:11 PM

## 2025-05-28 NOTE — ANESTHESIA PROCEDURE NOTES
Airway       Patient location during procedure: OR       Procedure Start/Stop Times: 5/28/2025 10:27 AM  Staff -        CRNA: Roslyn Rondon APRN CRNA       Performed By: CRNA  Consent for Airway        Urgency: elective  Indications and Patient Condition       Indications for airway management: yaa-procedural       Induction type:intravenous       Mask difficulty assessment: 1 - vent by mask    Final Airway Details       Final airway type: endotracheal airway       Successful airway: ETT - single and Oral  Endotracheal Airway Details        ETT size (mm): 6.5       Cuffed: yes       Cuff volume (mL): 10       Successful intubation technique: video laryngoscopy       VL Blade Size: Glidescope 3       Grade View of Cords: 1       Adjucts: stylet       Position: Right       Measured from: gums/teeth       Secured at (cm): 21       Bite block used: None    Post intubation assessment        Placement verified by: capnometry, equal breath sounds and chest rise        Number of attempts at approach: 1       Secured with: silk tape       Ease of procedure: easy       Dentition: Intact and Unchanged    Medication(s) Administered   Medication Administration Time: 5/28/2025 10:27 AM    Additional Comments       Head and neck maintained in neutral position.  Glidescope 3 blade used to visualize open and clear vocal cords-ett easily passed through open and clear vocal cords. + Etc02 and bilateral breath sounds.

## 2025-05-29 LAB
PATH REPORT.COMMENTS IMP SPEC: NORMAL
PATH REPORT.COMMENTS IMP SPEC: NORMAL
PATH REPORT.FINAL DX SPEC: NORMAL
PATH REPORT.GROSS SPEC: NORMAL
PATH REPORT.MICROSCOPIC SPEC OTHER STN: NORMAL
PATH REPORT.RELEVANT HX SPEC: NORMAL
PHOTO IMAGE: NORMAL

## 2025-05-29 NOTE — OP NOTE
Name:  Ellie Echols  Record # [id number]   : 1975  Care Provider: Domi Zheng MD   Admit Date:  2025       Obstetrics Gynecology - Operative Report    DATE OF SERVICE: 2025     PREOPERATIVE DIAGNOSIS: Abnormal uterine bleeding with failed conservative treatment, known adhesive  disease.    Postoperative diagnosis: Same    PROCEDURE:   Da Shane total laparoscopic assisted hysterectomy, bilateral salpingectomy and cystoscopy:   extensive adhesiolysis.  An extra 90 minutes to the case were necessary to expose ureters, bladder, takedown bowel and omentum to adequately and safely proceed.    FINDINGS:  Uterus was densely adherent to the anterior abdominal wall including portion of the uterus and the space of Retzius.    Ovaries were normal bilaterally.    Normal appearance to the fallopian tubes   to adequately and safely proceed.    Dense omental and bowel adhesions including a ventral hernia including bowel which was managed by Dr. Story.    Thick omental adhesions to the uterus incorporating bladder adhesions to uterus and sidewall.  Omental adhesions with bowel to the uterus and right sidewall   and left sidewall.   Via cystoscopy normal bladder with eeflux of urine from both ureters at the completion of the procedure.    ESTIMATED BLOOD LOSS: 150 ml    ANESTHESIA:  General.    SPECIMENS REMOVED:  Uterus, cervix and bilateral tubes.     COMPLICATIONS:   none noted.    COMMENTS: Ellie Echols  was met preoperatively with her   where we discussed the procedure and the risks associated with the procedure.  She understood these to be, but not limited to injury to adjacent organs including bladder, bowel, ureter, infection and bleeding.  Patient signed consent and was brought back to the operating room in stable condition.    Patient underwent induction of a general anesthetic, she was carefully prepped and draped for the procedure. Timeout was performed. Bladder was drained  with a Palmer catheter, uterine manipulator placed into the uterus.      Attention was turned to the abdomen.  An incision was made above the umbilicus.  A Veress needle was introduced with a 2 pop technique, saline drop test confirmed adequate placement.   Opening pressure was 4.   Insufflation was now done until 15mm of pressure were established.  An 7/8 Davinci XI nonbladed trocar was placed above the umbilicus. Two other robotic assist ports were place approximately  to the LEFT of the initial incision and to the LEFT of that.    A right upper quadrant 8 nonbladed trocar was placed    A final Davinci port was placed to 9-10cm lateral to the umbilical incision.  Trendelenburg assistance was used and the davinci was then brought to the patient s bedside.  The da Shane was then docked.  The Vessel sealer and PK bipolar forceps were placed at the left da Shane port, the monopolar scissors placed in right side of the body and I took my turn to the da Shane console.     The procedure began with identifying the anatomy.    thick omental adhesions were taken down right below the camera port.  This allowed better visualization of the abdominopelvic cavity.  Continued working on the dense omental adhesions we did encounter a large ventral hernia with bowel   General Surgery was called in.    Please see Dr. Story's portion of the op note.   Once these loops of small intestine were freed from the ventral hernia I took my turn back to the consult.      Continue to work on restoring normal anatomy the omentum on the left side densely adhered to the uterus was taken down with combination of sharp if it was near the bowel which was adhered and electrocautery when we are free from the bowel.  Ultimately I was able to find the left round ligament.    The left round ligament was identified entered and the retroperitoneal space identified.    The left tube was now removed in its entirety.    The IP ligament was skeletonized then  cauterized and transected.  The ovarian ligament was cauterized and transected.    Continued to development of the retroperitoneal space was performed and the uterine artery was found in its relationship with the ureter.  The uterine artery was coagulated at its exit from the obliterated umbilical artery.      The next portion focused on taking down adhesions on the right side.  The right fallopian tube was removed in its entirety.  The IP ligament skeletonized and transected and the ovarian ligament was skeletonized and transected.    In a similar fashion dense adhesions were taken down.  The retroperitoneal space was identified and entered allowing nonscarring tissue to be identified including the ureter which was then followed down its anatomical course.    The uterine artery was identified with this relationship and uterine artery coagulated away from the ureter.      At this point continued lysis of adhesion was performed including the very densely adherent uterus to the anterior abdominal wall and including portion of the space of Retzius.  The bladder was carefully backfilled back and forth to ensure its integrity.  Ultimately the bladder was able to be dissected free from the lower uterine segment of the uterus.  Once this was performed the uterus was then disconnected from the anterior abdominal wall.      We now were able to put an instrument in the vagina to visualize where the cervix was.      The uterine vasculature now at the cervical uterus junction was coagulated and transected.    A bladder flap was created in its entirety.      Next anterior colpotomy and posterior colpotomy were performed.      The uterus and cervix were then pulled out through the vagina including portions of omentum that had been removed.      There was a proximal vaginal tear,  2 cm, that was repaired with 3-0 Vicryl in a running locked nonlocking suture.   The vaginal cuff was closed with  oh V-Loc suture.  Both angles were  elevated to its ipsilateral uterosacral ligament with individual figure-of-8 sutures,  of 0 Vicryl    At this junction, the procedure  was complete.  Sponge, lap and needle counts were correct x 2.  I took my turn to cystoscopy, noting normal ureter and bladder anatomy. Strong urine eflux bilaterally was noted from both ureteral orfices.  The trocars were removed and the rafita was removed from the abdomen.   Skin was closed with  4-0 Monocryl. Steri-Strips applied.  She was brought to the recovery in stable condition.    Domi Zheng MD

## (undated) DEVICE — DAVINCI XI HANDPIECE ESU VESSEL SEALER 8MM EXT 480422

## (undated) DEVICE — BARRIER INTERCEED 3X4" 4350

## (undated) DEVICE — DRAPE POUCH INSTRUMENT 3 POCKET 1018L

## (undated) DEVICE — SYR 50ML SLIP TIP W/O NDL 309654

## (undated) DEVICE — DRAPE SHEET TABLE COVER KC 42301*

## (undated) DEVICE — DEVICE RUMI II KOH-EFFICIENT 2.5CM KC-RUMI-25

## (undated) DEVICE — PREP CHLORAPREP 26ML TINTED HI-LITE ORANGE 930815

## (undated) DEVICE — DAVINCI XI DRAPE ARM 470015

## (undated) DEVICE — SU MONOCRYL+ 4-0 18IN PS2 UND MCP496G

## (undated) DEVICE — PREP POVIDONE-IODINE 10% SOLUTION 4OZ BOTTLE MDS093944

## (undated) DEVICE — SYR 50ML LL W/O NDL 309653

## (undated) DEVICE — LUBRICANT INST ELECTROLUBE EL101

## (undated) DEVICE — PRTC STD XTRMT TRND ARM HKLP CLSR LF DISP TAP100

## (undated) DEVICE — CATH FOLEY 16FR 5ML LUBRICATH LATEX 0165L16

## (undated) DEVICE — PACK TRENGUARD 450 PROCEDURAL 2065406

## (undated) DEVICE — CUSTOM PACK DA VINCI GYN SMA5BDVHEA

## (undated) DEVICE — ANTIFOG SOLUTION SEE SHARP 150M TROCAR SWABS 30978 (COI)

## (undated) DEVICE — DAVINCI XI DRAPE COLUMN 470341

## (undated) DEVICE — SUCTION MANIFOLD NEPTUNE 2 SYS 1 PORT 702-025-000

## (undated) DEVICE — DAVINCI XI OBTURATOR BLADELESS 8MM 470359

## (undated) DEVICE — BLADE KNIFE SURG 15 371115

## (undated) DEVICE — SU VICRYL+ 0 27IN CT-1 UND VCP260H

## (undated) DEVICE — SUCTION STRYKERFLOW II 250-070-500

## (undated) DEVICE — NDL INSUFFLATION 13GA 120MM C2201

## (undated) DEVICE — SOL WATER IRRIG 1000ML BOTTLE 2F7114

## (undated) DEVICE — UTERINE MANIPULATOR RUMI 6.7MMX6CM UMW676

## (undated) DEVICE — TUBING FILTER TRI-LUMEN AIRSEAL ASC-EVAC1

## (undated) DEVICE — MAT FLOOR WATERPROOF BACKSHEET FMBP30

## (undated) DEVICE — TUBING IRR LG BORE TUBE DRIP CHMBR 2 BG 94IN 313003

## (undated) DEVICE — DAVINCI XI SEAL UNIVERSAL 5-12MM 470500

## (undated) DEVICE — PSTNR KIT NUBLUE FM CHST PD BD STRAP TRND LF TP3000E-S-NB

## (undated) DEVICE — GLOVE PI ULTRATCH M LF SZ 6.5 PF CUFF TEXT STRL LF 42665

## (undated) DEVICE — PREP POVIDONE-IODINE 7.5% SCRUB 4OZ BOTTLE MDS093945

## (undated) DEVICE — SOLUTION IV 2B0304X STRL WATER 1000ML

## (undated) DEVICE — TIP RUMI MANIPULATOR YELLOW 5.1MMX3.75MM

## (undated) DEVICE — VIAL DECANTER STERILE WHITE DYNJDEC06

## (undated) DEVICE — NEEDLE HYPO MAGELLAN SAFETY 22GA 1 1/2IN 8881850215

## (undated) DEVICE — SYR 10ML LL W/O NDL 302995

## (undated) DEVICE — DAVINCI HOT SHEARS TIP COVER  400180

## (undated) DEVICE — SU VICRYL+ 3-0 27IN SH UND VCP416H

## (undated) DEVICE — Device

## (undated) DEVICE — SU WND CLOSURE VLOC 180 ABS 0 12" GS-21 VLOCL0316

## (undated) RX ORDER — KETOROLAC TROMETHAMINE 30 MG/ML
INJECTION, SOLUTION INTRAMUSCULAR; INTRAVENOUS
Status: DISPENSED
Start: 2025-05-28

## (undated) RX ORDER — METOPROLOL TARTRATE 1 MG/ML
INJECTION, SOLUTION INTRAVENOUS
Status: DISPENSED
Start: 2025-05-28

## (undated) RX ORDER — BUPIVACAINE HYDROCHLORIDE 2.5 MG/ML
INJECTION, SOLUTION EPIDURAL; INFILTRATION; INTRACAUDAL; PERINEURAL
Status: DISPENSED
Start: 2025-05-28

## (undated) RX ORDER — PROPOFOL 10 MG/ML
INJECTION, EMULSION INTRAVENOUS
Status: DISPENSED
Start: 2025-05-28

## (undated) RX ORDER — FENTANYL CITRATE 50 UG/ML
INJECTION, SOLUTION INTRAMUSCULAR; INTRAVENOUS
Status: DISPENSED
Start: 2025-05-28